# Patient Record
Sex: MALE | Race: WHITE | NOT HISPANIC OR LATINO | Employment: FULL TIME | URBAN - METROPOLITAN AREA
[De-identification: names, ages, dates, MRNs, and addresses within clinical notes are randomized per-mention and may not be internally consistent; named-entity substitution may affect disease eponyms.]

---

## 2019-04-02 ENCOUNTER — OFFICE VISIT (OUTPATIENT)
Dept: INTERNAL MEDICINE | Facility: CLINIC | Age: 49
End: 2019-04-02

## 2019-04-02 VITALS
HEART RATE: 70 BPM | DIASTOLIC BLOOD PRESSURE: 92 MMHG | WEIGHT: 240 LBS | HEIGHT: 71 IN | TEMPERATURE: 98.2 F | SYSTOLIC BLOOD PRESSURE: 162 MMHG | RESPIRATION RATE: 18 BRPM | OXYGEN SATURATION: 99 % | BODY MASS INDEX: 33.6 KG/M2

## 2019-04-02 DIAGNOSIS — R20.2 NUMBNESS AND TINGLING: ICD-10-CM

## 2019-04-02 DIAGNOSIS — I10 ESSENTIAL HYPERTENSION: Primary | ICD-10-CM

## 2019-04-02 DIAGNOSIS — R20.0 NUMBNESS AND TINGLING: ICD-10-CM

## 2019-04-02 DIAGNOSIS — R35.0 URINARY FREQUENCY: ICD-10-CM

## 2019-04-02 LAB
ANION GAP SERPL CALCULATED.3IONS-SCNC: 8 MMOL/L (ref 3–11)
BASOPHILS # BLD AUTO: 0.03 10*3/MM3 (ref 0–0.2)
BASOPHILS NFR BLD AUTO: 0.3 % (ref 0–1)
BUN BLD-MCNC: 11 MG/DL (ref 9–23)
BUN/CREAT SERPL: 8.6 (ref 7–25)
CALCIUM SPEC-SCNC: 9.7 MG/DL (ref 8.7–10.4)
CHLORIDE SERPL-SCNC: 104 MMOL/L (ref 99–109)
CO2 SERPL-SCNC: 23 MMOL/L (ref 20–31)
CREAT BLD-MCNC: 1.28 MG/DL (ref 0.6–1.3)
DEPRECATED RDW RBC AUTO: 46.4 FL (ref 37–54)
EOSINOPHIL # BLD AUTO: 0.24 10*3/MM3 (ref 0–0.3)
EOSINOPHIL NFR BLD AUTO: 2.6 % (ref 0–3)
ERYTHROCYTE [DISTWIDTH] IN BLOOD BY AUTOMATED COUNT: 13.4 % (ref 11.3–14.5)
GFR SERPL CREATININE-BSD FRML MDRD: 60 ML/MIN/1.73
GLUCOSE BLD-MCNC: 113 MG/DL (ref 70–100)
HBA1C MFR BLD: 6.1 % (ref 4.8–5.6)
HCT VFR BLD AUTO: 51.8 % (ref 38.9–50.9)
HGB BLD-MCNC: 17.4 G/DL (ref 13.1–17.5)
IMM GRANULOCYTES # BLD AUTO: 0.02 10*3/MM3 (ref 0–0.05)
IMM GRANULOCYTES NFR BLD AUTO: 0.2 % (ref 0–0.6)
LYMPHOCYTES # BLD AUTO: 2.28 10*3/MM3 (ref 0.6–4.8)
LYMPHOCYTES NFR BLD AUTO: 25.1 % (ref 24–44)
MCH RBC QN AUTO: 32 PG (ref 27–31)
MCHC RBC AUTO-ENTMCNC: 33.6 G/DL (ref 32–36)
MCV RBC AUTO: 95.2 FL (ref 80–99)
MONOCYTES # BLD AUTO: 0.68 10*3/MM3 (ref 0–1)
MONOCYTES NFR BLD AUTO: 7.5 % (ref 0–12)
NEUTROPHILS # BLD AUTO: 5.87 10*3/MM3 (ref 1.5–8.3)
NEUTROPHILS NFR BLD AUTO: 64.5 % (ref 41–71)
PLATELET # BLD AUTO: 242 10*3/MM3 (ref 150–450)
PMV BLD AUTO: 11.3 FL (ref 6–12)
POTASSIUM BLD-SCNC: 4.5 MMOL/L (ref 3.5–5.5)
RBC # BLD AUTO: 5.44 10*6/MM3 (ref 4.2–5.76)
SODIUM BLD-SCNC: 135 MMOL/L (ref 132–146)
TSH SERPL DL<=0.05 MIU/L-ACNC: 1.25 MIU/ML (ref 0.35–5.35)
VIT B12 BLD-MCNC: 543 PG/ML (ref 211–911)
WBC NRBC COR # BLD: 9.1 10*3/MM3 (ref 3.5–10.8)

## 2019-04-02 PROCEDURE — 83036 HEMOGLOBIN GLYCOSYLATED A1C: CPT | Performed by: NURSE PRACTITIONER

## 2019-04-02 PROCEDURE — 82607 VITAMIN B-12: CPT | Performed by: NURSE PRACTITIONER

## 2019-04-02 PROCEDURE — 80048 BASIC METABOLIC PNL TOTAL CA: CPT | Performed by: NURSE PRACTITIONER

## 2019-04-02 PROCEDURE — 99204 OFFICE O/P NEW MOD 45 MIN: CPT | Performed by: NURSE PRACTITIONER

## 2019-04-02 PROCEDURE — 85025 COMPLETE CBC W/AUTO DIFF WBC: CPT | Performed by: NURSE PRACTITIONER

## 2019-04-02 PROCEDURE — 84443 ASSAY THYROID STIM HORMONE: CPT | Performed by: NURSE PRACTITIONER

## 2019-04-02 RX ORDER — LISINOPRIL 20 MG/1
TABLET ORAL
Qty: 30 TABLET | Refills: 2 | Status: SHIPPED | OUTPATIENT
Start: 2019-04-02 | End: 2019-04-16 | Stop reason: SDUPTHER

## 2019-04-02 RX ORDER — ASPIRIN 81 MG/1
81 TABLET ORAL DAILY
Qty: 30 TABLET | Refills: 11 | Status: SHIPPED | OUTPATIENT
Start: 2019-04-02 | End: 2019-12-23 | Stop reason: SDUPTHER

## 2019-04-02 NOTE — PROGRESS NOTES
"Subjective   Juan C Castro is a 49 y.o. male here today for HTN.    Chief Complaint   Patient presents with   • Hypertension     migraine   • Urinary Frequency     numbness in feet.     Juan C reports that about a month ago he started having headaches- relieved by advil- pain in base of skull- checked his BP and it was 190/110- he began taking his mother's lisinopril and has seen improvement of the headaches.  He does have a significant cardiac family history. Did smoke for 19 years and now vapes (approx equivalent to 0.5 ppd).  Sometimes has shoulder pain with anxiety- \"tightness\".   He has had a stress test in 2009 and echo which were normal.  Denies SOA, LE edema.  He did recently get a new RX for glasses which has helped.  He does get 9-12,000 steps a day.  He reports his diet is getting better.      He reports urinary frequency but does drink a lot of water- has been having this for a long time.  He denies nighttime wakening.  He also reports numbness of his left hand- happens when he lays on his left shoulder- positional and resolves by changing positions.  Does have a family history of diabetes.  Review of Systems   Constitutional: Negative for activity change, appetite change, chills, fever, unexpected weight gain and unexpected weight loss.   HENT: Negative for congestion, ear pain, nosebleeds, postnasal drip, sore throat, trouble swallowing and voice change.    Eyes: Negative for blurred vision, pain, itching and visual disturbance.   Respiratory: Positive for chest tightness. Negative for cough and shortness of breath.    Cardiovascular: Negative for chest pain and palpitations.   Gastrointestinal: Negative for blood in stool, diarrhea, nausea and vomiting.   Endocrine: Negative for polydipsia, polyphagia and polyuria.   Genitourinary: Positive for frequency. Negative for urinary incontinence, decreased urine volume, difficulty urinating, dysuria, flank pain, genital sores, hematuria, " "nocturia and urgency.   Musculoskeletal: Positive for arthralgias. Negative for myalgias.   Skin: Negative for rash and skin lesions.   Allergic/Immunologic: Negative for environmental allergies, food allergies and immunocompromised state.   Neurological: Positive for headache. Negative for dizziness, seizures, weakness, memory problem and confusion.   Psychiatric/Behavioral: Negative for self-injury, sleep disturbance, suicidal ideas and depressed mood. The patient is nervous/anxious.        History reviewed. No pertinent past medical history.  Family History   Problem Relation Age of Onset   • Diabetes Mother    • Hypertension Mother    • Heart attack Father    • Heart attack Paternal Grandfather      Past Surgical History:   Procedure Laterality Date   • KNEE ACL RECONSTRUCTION       Social History     Socioeconomic History   • Marital status: Single     Spouse name: Not on file   • Number of children: Not on file   • Years of education: Not on file   • Highest education level: Not on file   Tobacco Use   • Smoking status: Current Some Day Smoker     Packs/day: 0.50     Types: Cigarettes, Electronic Cigarette   Substance and Sexual Activity   • Alcohol use: Yes         Current Outpatient Medications:   •  aspirin 81 MG EC tablet, Take 1 tablet by mouth Daily., Disp: 30 tablet, Rfl: 11  •  lisinopril (PRINIVIL,ZESTRIL) 20 MG tablet, Take 0.5 tabs for 7 days, then increase to 1 tab daily, Disp: 30 tablet, Rfl: 2    Objective   Vitals:    04/02/19 1039   BP: 162/92   Pulse: 70   Resp: 18   Temp: 98.2 °F (36.8 °C)   TempSrc: Temporal   SpO2: 99%   Weight: 109 kg (240 lb)   Height: 180.3 cm (71\")     Body mass index is 33.47 kg/m².    Physical Exam   Constitutional: He is oriented to person, place, and time. He appears well-developed and well-nourished. No distress.   HENT:   Head: Normocephalic and atraumatic.   Eyes: Pupils are equal, round, and reactive to light.   Neck: Neck supple. No thyromegaly present. "   Cardiovascular: Normal rate, regular rhythm, normal heart sounds and intact distal pulses.   No murmur heard.  Pulmonary/Chest: Effort normal and breath sounds normal. No respiratory distress. He has no wheezes.   Abdominal: Soft. He exhibits no distension.   Musculoskeletal: He exhibits no edema or deformity.   Neurological: He is alert and oriented to person, place, and time.   Skin: Skin is warm and dry. Capillary refill takes 2 to 3 seconds. He is not diaphoretic.   Psychiatric: He has a normal mood and affect. His behavior is normal. Judgment and thought content normal.   Nursing note and vitals reviewed.      Assessment/Plan   Problem List Items Addressed This Visit        Cardiovascular and Mediastinum    Essential hypertension - Primary    Relevant Medications    lisinopril (PRINIVIL,ZESTRIL) 20 MG tablet    aspirin 81 MG EC tablet    Other Relevant Orders    Basic Metabolic Panel    CBC & Differential    CBC Auto Differential      Other Visit Diagnoses     Numbness and tingling        Relevant Orders    Basic Metabolic Panel    TSH    Vitamin B12    Hemoglobin A1c    Urinary frequency        Relevant Orders    Hemoglobin A1c          Juan C was seen today for hypertension and urinary frequency.    Diagnoses and all orders for this visit:    Essential hypertension  -     Basic Metabolic Panel  -     CBC & Differential  -     lisinopril (PRINIVIL,ZESTRIL) 20 MG tablet; Take 0.5 tabs for 7 days, then increase to 1 tab daily  -     aspirin 81 MG EC tablet; Take 1 tablet by mouth Daily.  -     CBC Auto Differential  - discussed reducing risk of heart disease but controlling BP, adding ASA, will check lipids at a physical while fasting, improving diet and smoking cessation. FU in 2 weeks  Numbness and tingling  -     Basic Metabolic Panel  -     TSH  -     Vitamin B12  -     Hemoglobin A1c    Urinary frequency  -     Hemoglobin A1c             Plan of care reviewed with the patient at the conclusion of  today's visit.  Education was provided regarding diagnosis, management, and any prescribed or recommended OTC medications.  Patient verbalized understanding of and agreement with management plan.     Return in about 2 weeks (around 4/16/2019).      Elin Tolentino APRN

## 2019-04-03 PROBLEM — R73.03 PREDIABETES: Status: ACTIVE | Noted: 2019-04-03

## 2019-04-03 PROBLEM — N18.2 CKD (CHRONIC KIDNEY DISEASE) STAGE 2, GFR 60-89 ML/MIN: Status: ACTIVE | Noted: 2019-04-03

## 2019-04-16 ENCOUNTER — OFFICE VISIT (OUTPATIENT)
Dept: INTERNAL MEDICINE | Facility: CLINIC | Age: 49
End: 2019-04-16

## 2019-04-16 ENCOUNTER — TELEPHONE (OUTPATIENT)
Dept: INTERNAL MEDICINE | Facility: CLINIC | Age: 49
End: 2019-04-16

## 2019-04-16 VITALS
OXYGEN SATURATION: 99 % | HEART RATE: 79 BPM | DIASTOLIC BLOOD PRESSURE: 88 MMHG | WEIGHT: 239 LBS | TEMPERATURE: 97.6 F | RESPIRATION RATE: 16 BRPM | HEIGHT: 71 IN | BODY MASS INDEX: 33.46 KG/M2 | SYSTOLIC BLOOD PRESSURE: 138 MMHG

## 2019-04-16 DIAGNOSIS — I10 ESSENTIAL HYPERTENSION: Primary | ICD-10-CM

## 2019-04-16 DIAGNOSIS — N18.2 CKD (CHRONIC KIDNEY DISEASE) STAGE 2, GFR 60-89 ML/MIN: ICD-10-CM

## 2019-04-16 DIAGNOSIS — R73.03 PREDIABETES: ICD-10-CM

## 2019-04-16 PROCEDURE — 99214 OFFICE O/P EST MOD 30 MIN: CPT | Performed by: NURSE PRACTITIONER

## 2019-04-16 RX ORDER — LISINOPRIL 30 MG/1
TABLET ORAL
Qty: 90 TABLET | Refills: 1 | Status: SHIPPED | OUTPATIENT
Start: 2019-04-16 | End: 2019-04-16 | Stop reason: SDUPTHER

## 2019-04-16 RX ORDER — LISINOPRIL 30 MG/1
30 TABLET ORAL DAILY
Qty: 90 TABLET | Refills: 1 | Status: SHIPPED | OUTPATIENT
Start: 2019-04-16 | End: 2019-12-23

## 2019-04-16 NOTE — PROGRESS NOTES
Subjective   Juan C Castro is a 49 y.o. male here today for HTN/ CKD.    Chief Complaint   Patient presents with   • Hypertension     Juan C is here today for a 2-week follow-up on hypertension.  He was also found to have a decreased GFR and elevated creatinine.  He has avoided NSAIDs and staying hydrated.  He has checked his blood pressure twice since her checkup and it has been in the 140s 150s over 80s-90s.  He denies any chest pain, shortness of breath, lower extremity edema.  He was also found to be prediabetic.  He has already lost 1 pound and started to make many dietary modifications.  Review of Systems   Constitutional: Negative for diaphoresis, fatigue, unexpected weight gain and unexpected weight loss.   HENT: Negative for nosebleeds and trouble swallowing.    Eyes: Negative for blurred vision and visual disturbance.   Respiratory: Negative for chest tightness and shortness of breath.    Cardiovascular: Negative for chest pain, palpitations and leg swelling.   Gastrointestinal: Negative for blood in stool, nausea and vomiting.   Skin: Negative for rash and skin lesions.   Neurological: Negative for dizziness, syncope, facial asymmetry, light-headedness, headache, memory problem and confusion.       History reviewed. No pertinent past medical history.  Family History   Problem Relation Age of Onset   • Diabetes Mother    • Hypertension Mother    • Heart attack Father    • Heart attack Paternal Grandfather      Past Surgical History:   Procedure Laterality Date   • KNEE ACL RECONSTRUCTION       Social History     Socioeconomic History   • Marital status: Single     Spouse name: Not on file   • Number of children: Not on file   • Years of education: Not on file   • Highest education level: Not on file   Tobacco Use   • Smoking status: Current Some Day Smoker     Packs/day: 0.50     Types: Cigarettes, Electronic Cigarette   Substance and Sexual Activity   • Alcohol use: Yes         Current  "Outpatient Medications:   •  aspirin 81 MG EC tablet, Take 1 tablet by mouth Daily., Disp: 30 tablet, Rfl: 11  •  lisinopril (PRINIVIL,ZESTRIL) 30 MG tablet, Take 1 tablet by mouth Daily., Disp: 90 tablet, Rfl: 1    Objective   Vitals:    04/16/19 1110   BP: 138/88   Pulse: 79   Resp: 16   Temp: 97.6 °F (36.4 °C)   TempSrc: Temporal   SpO2: 99%   Weight: 108 kg (239 lb)   Height: 180.3 cm (71\")     Body mass index is 33.33 kg/m².  Physical Exam   Constitutional: He is oriented to person, place, and time. He appears well-developed and well-nourished. No distress.   Eyes: Pupils are equal, round, and reactive to light.   Neck: Neck supple. No thyromegaly present.   Cardiovascular: Normal rate and regular rhythm.   Pulmonary/Chest: Effort normal and breath sounds normal.   Musculoskeletal: He exhibits no edema.   Neurological: He is alert and oriented to person, place, and time.   Skin: Skin is warm and dry. Capillary refill takes 2 to 3 seconds. He is not diaphoretic.   Psychiatric: He has a normal mood and affect. His behavior is normal. Judgment and thought content normal.   Nursing note and vitals reviewed.      Assessment/Plan   Problem List Items Addressed This Visit        Cardiovascular and Mediastinum    Essential hypertension - Primary    Relevant Medications    aspirin 81 MG EC tablet    lisinopril (PRINIVIL,ZESTRIL) 30 MG tablet    Other Relevant Orders    CBC & Differential    Basic Metabolic Panel       Genitourinary    CKD (chronic kidney disease) stage 2, GFR 60-89 ml/min    Relevant Orders    CBC & Differential    Basic Metabolic Panel    Urinalysis With Microscopic - Urine, Clean Catch       Other    Prediabetes        Juan C was seen today for hypertension.    Diagnoses and all orders for this visit:    Essential hypertension  -     CBC & Differential  -     Basic Metabolic Panel  -     lisinopril (PRINIVIL,ZESTRIL) 30 MG tablet; 1 tab daily  Increase to 30 mg, follow-up in 3 months with a " physical  CKD (chronic kidney disease) stage 2, GFR 60-89 ml/min  -     CBC & Differential  -     Basic Metabolic Panel  -     Urinalysis With Microscopic - Urine, Clean Catch; Future    Prediabetes  Dietary restrictions discussed, follow-up in 3 months           The patient was counseled regarding diagnostic results, impressions, prognosis, instructions for management, risk factor reductions, education, and importance of treatment compliance.  The patient verbalized understanding of and agreement with the plan of care.    Advised patient to call with any further questions and any new or worsening symptoms.     Return in about 3 months (around 7/16/2019) for Physical w/Next Visit.      Elin Tolentino, APRN

## 2019-04-16 NOTE — TELEPHONE ENCOUNTER
PHARMACY CALLED ASKING FOR CLARIFICATION ON THE LISINOPRIL. IS IT 1?2 A TABLET FOR 7 DAYS, OR JUST 1 DAILY.

## 2019-07-17 ENCOUNTER — TELEPHONE (OUTPATIENT)
Dept: INTERNAL MEDICINE | Facility: CLINIC | Age: 49
End: 2019-07-17

## 2019-10-28 DIAGNOSIS — I10 ESSENTIAL HYPERTENSION: ICD-10-CM

## 2019-10-28 RX ORDER — LISINOPRIL 30 MG/1
TABLET ORAL
Qty: 30 TABLET | Refills: 0 | Status: SHIPPED | OUTPATIENT
Start: 2019-10-28 | End: 2019-11-26 | Stop reason: SDUPTHER

## 2019-11-26 DIAGNOSIS — I10 ESSENTIAL HYPERTENSION: ICD-10-CM

## 2019-11-26 RX ORDER — LISINOPRIL 30 MG/1
TABLET ORAL
Qty: 30 TABLET | Refills: 0 | Status: SHIPPED | OUTPATIENT
Start: 2019-11-26 | End: 2019-12-23 | Stop reason: SDUPTHER

## 2019-12-23 ENCOUNTER — OFFICE VISIT (OUTPATIENT)
Dept: INTERNAL MEDICINE | Facility: CLINIC | Age: 49
End: 2019-12-23

## 2019-12-23 VITALS
RESPIRATION RATE: 16 BRPM | HEART RATE: 63 BPM | OXYGEN SATURATION: 99 % | HEIGHT: 71 IN | SYSTOLIC BLOOD PRESSURE: 122 MMHG | TEMPERATURE: 98.4 F | BODY MASS INDEX: 32.2 KG/M2 | WEIGHT: 230 LBS | DIASTOLIC BLOOD PRESSURE: 80 MMHG

## 2019-12-23 DIAGNOSIS — Z23 NEED FOR INFLUENZA VACCINATION: ICD-10-CM

## 2019-12-23 DIAGNOSIS — I10 ESSENTIAL HYPERTENSION: ICD-10-CM

## 2019-12-23 DIAGNOSIS — Z00.00 ANNUAL PHYSICAL EXAM: Primary | ICD-10-CM

## 2019-12-23 PROBLEM — B00.9 HSV-1 (HERPES SIMPLEX VIRUS 1) INFECTION: Status: ACTIVE | Noted: 2019-12-23

## 2019-12-23 LAB
25(OH)D3 SERPL-MCNC: 22.2 NG/ML (ref 30–100)
ALBUMIN SERPL-MCNC: 4.3 G/DL (ref 3.5–5.2)
ALBUMIN/GLOB SERPL: 1.2 G/DL
ALP SERPL-CCNC: 95 U/L (ref 39–117)
ALT SERPL W P-5'-P-CCNC: 16 U/L (ref 1–41)
ANION GAP SERPL CALCULATED.3IONS-SCNC: 11.3 MMOL/L (ref 5–15)
AST SERPL-CCNC: 16 U/L (ref 1–40)
BASOPHILS # BLD AUTO: 0.05 10*3/MM3 (ref 0–0.2)
BASOPHILS NFR BLD AUTO: 0.6 % (ref 0–1.5)
BILIRUB SERPL-MCNC: 0.3 MG/DL (ref 0.2–1.2)
BUN BLD-MCNC: 10 MG/DL (ref 6–20)
BUN/CREAT SERPL: 8.2 (ref 7–25)
CALCIUM SPEC-SCNC: 9.5 MG/DL (ref 8.6–10.5)
CHLORIDE SERPL-SCNC: 102 MMOL/L (ref 98–107)
CHOLEST SERPL-MCNC: 236 MG/DL (ref 0–200)
CO2 SERPL-SCNC: 23.7 MMOL/L (ref 22–29)
CREAT BLD-MCNC: 1.22 MG/DL (ref 0.76–1.27)
DEPRECATED RDW RBC AUTO: 39.6 FL (ref 37–54)
EOSINOPHIL # BLD AUTO: 0.29 10*3/MM3 (ref 0–0.4)
EOSINOPHIL NFR BLD AUTO: 3.5 % (ref 0.3–6.2)
ERYTHROCYTE [DISTWIDTH] IN BLOOD BY AUTOMATED COUNT: 12.1 % (ref 12.3–15.4)
GFR SERPL CREATININE-BSD FRML MDRD: 63 ML/MIN/1.73
GLOBULIN UR ELPH-MCNC: 3.7 GM/DL
GLUCOSE BLD-MCNC: 105 MG/DL (ref 65–99)
HBA1C MFR BLD: 6.16 % (ref 4.8–5.6)
HCT VFR BLD AUTO: 47.8 % (ref 37.5–51)
HDLC SERPL-MCNC: 37 MG/DL (ref 40–60)
HGB BLD-MCNC: 16.5 G/DL (ref 13–17.7)
IMM GRANULOCYTES # BLD AUTO: 0.02 10*3/MM3 (ref 0–0.05)
IMM GRANULOCYTES NFR BLD AUTO: 0.2 % (ref 0–0.5)
LDLC SERPL CALC-MCNC: 172 MG/DL (ref 0–100)
LDLC/HDLC SERPL: 4.66 {RATIO}
LYMPHOCYTES # BLD AUTO: 2.16 10*3/MM3 (ref 0.7–3.1)
LYMPHOCYTES NFR BLD AUTO: 26.2 % (ref 19.6–45.3)
MCH RBC QN AUTO: 31.3 PG (ref 26.6–33)
MCHC RBC AUTO-ENTMCNC: 34.5 G/DL (ref 31.5–35.7)
MCV RBC AUTO: 90.5 FL (ref 79–97)
MONOCYTES # BLD AUTO: 0.61 10*3/MM3 (ref 0.1–0.9)
MONOCYTES NFR BLD AUTO: 7.4 % (ref 5–12)
NEUTROPHILS # BLD AUTO: 5.12 10*3/MM3 (ref 1.7–7)
NEUTROPHILS NFR BLD AUTO: 62.1 % (ref 42.7–76)
NRBC BLD AUTO-RTO: 0 /100 WBC (ref 0–0.2)
PLATELET # BLD AUTO: 254 10*3/MM3 (ref 140–450)
PMV BLD AUTO: 10.9 FL (ref 6–12)
POTASSIUM BLD-SCNC: 4.8 MMOL/L (ref 3.5–5.2)
PROT SERPL-MCNC: 8 G/DL (ref 6–8.5)
PSA SERPL-MCNC: 0.53 NG/ML (ref 0–4)
RBC # BLD AUTO: 5.28 10*6/MM3 (ref 4.14–5.8)
SODIUM BLD-SCNC: 137 MMOL/L (ref 136–145)
TRIGL SERPL-MCNC: 133 MG/DL (ref 0–150)
TSH SERPL DL<=0.05 MIU/L-ACNC: 0.7 UIU/ML (ref 0.27–4.2)
VLDLC SERPL-MCNC: 26.6 MG/DL (ref 5–40)
WBC NRBC COR # BLD: 8.25 10*3/MM3 (ref 3.4–10.8)

## 2019-12-23 PROCEDURE — 90674 CCIIV4 VAC NO PRSV 0.5 ML IM: CPT | Performed by: NURSE PRACTITIONER

## 2019-12-23 PROCEDURE — 80053 COMPREHEN METABOLIC PANEL: CPT | Performed by: NURSE PRACTITIONER

## 2019-12-23 PROCEDURE — 83036 HEMOGLOBIN GLYCOSYLATED A1C: CPT | Performed by: NURSE PRACTITIONER

## 2019-12-23 PROCEDURE — 82306 VITAMIN D 25 HYDROXY: CPT | Performed by: NURSE PRACTITIONER

## 2019-12-23 PROCEDURE — 99396 PREV VISIT EST AGE 40-64: CPT | Performed by: NURSE PRACTITIONER

## 2019-12-23 PROCEDURE — 90471 IMMUNIZATION ADMIN: CPT | Performed by: NURSE PRACTITIONER

## 2019-12-23 PROCEDURE — 80061 LIPID PANEL: CPT | Performed by: NURSE PRACTITIONER

## 2019-12-23 PROCEDURE — 85025 COMPLETE CBC W/AUTO DIFF WBC: CPT | Performed by: NURSE PRACTITIONER

## 2019-12-23 PROCEDURE — G0103 PSA SCREENING: HCPCS | Performed by: NURSE PRACTITIONER

## 2019-12-23 PROCEDURE — 84443 ASSAY THYROID STIM HORMONE: CPT | Performed by: NURSE PRACTITIONER

## 2019-12-23 RX ORDER — ASPIRIN 81 MG/1
81 TABLET ORAL DAILY
Qty: 30 TABLET | Refills: 11 | Status: SHIPPED | OUTPATIENT
Start: 2019-12-23 | End: 2020-06-23 | Stop reason: SDUPTHER

## 2019-12-23 RX ORDER — LISINOPRIL 30 MG/1
TABLET ORAL
Qty: 30 TABLET | Refills: 5 | Status: SHIPPED | OUTPATIENT
Start: 2019-12-23 | End: 2020-01-06

## 2019-12-23 NOTE — PATIENT INSTRUCTIONS
Mediterranean Diet  A Mediterranean diet refers to food and lifestyle choices that are based on the traditions of countries located on the [place]. This way of eating has been shown to help prevent certain conditions and improve outcomes for people who have chronic diseases, like kidney disease and heart disease.  What are tips for following this plan?  Lifestyle  · Cook and eat meals together with your family, when possible.  · Drink enough fluid to keep your urine clear or pale yellow.  · Be physically active every day. This includes:  ? Aerobic exercise like running or swimming.  ? Leisure activities like gardening, walking, or housework.  · Get 7-8 hours of sleep each night.  · If recommended by your health care provider, drink red wine in moderation. This means 1 glass a day for nonpregnant women and 2 glasses a day for men. A glass of wine equals 5 oz (150 mL).  Reading food labels    · Check the serving size of packaged foods. For foods such as rice and pasta, the serving size refers to the amount of cooked product, not dry.  · Check the total fat in packaged foods. Avoid foods that have saturated fat or trans fats.  · Check the ingredients list for added sugars, such as corn syrup.  Shopping  · At the grocery store, buy most of your food from the areas near the walls of the store. This includes:  ? Fresh fruits and vegetables (produce).  ? Grains, beans, nuts, and seeds. Some of these may be available in unpackaged forms or large amounts (in bulk).  ? Fresh seafood.  ? Poultry and eggs.  ? Low-fat dairy products.  · Buy whole ingredients instead of prepackaged foods.  · Buy fresh fruits and vegetables in-season from local farmers markets.  · Buy frozen fruits and vegetables in resealable bags.  · If you do not have access to quality fresh seafood, buy precooked frozen shrimp or canned fish, such as tuna, salmon, or sardines.  · Buy small amounts of raw or cooked vegetables, salads, or olives from  the deli or salad bar at your store.  · Stock your pantry so you always have certain foods on hand, such as olive oil, canned tuna, canned tomatoes, rice, pasta, and beans.  Cooking  · Cook foods with extra-virgin olive oil instead of using butter or other vegetable oils.  · Have meat as a side dish, and have vegetables or grains as your main dish. This means having meat in small portions or adding small amounts of meat to foods like pasta or stew.  · Use beans or vegetables instead of meat in common dishes like chili or lasagna.  · [place] with different cooking methods. Try roasting or broiling vegetables instead of steaming or sautéeing them.  · Add frozen vegetables to soups, stews, pasta, or rice.  · Add nuts or seeds for added healthy fat at each meal. You can add these to yogurt, salads, or vegetable dishes.  · Marinate fish or vegetables using olive oil, lemon juice, garlic, and fresh herbs.  Meal planning    · Plan to eat 1 vegetarian meal one day each week. Try to work up to 2 vegetarian meals, if possible.  · Eat seafood 2 or more times a week.  · Have healthy snacks readily available, such as:  ? Vegetable sticks with hummus.  ? Greek yogurt.  ? Fruit and nut trail mix.  · Eat balanced meals throughout the week. This includes:  ? Fruit: 2-3 servings a day  ? Vegetables: 4-5 servings a day  ? Low-fat dairy: 2 servings a day  ? Fish, poultry, or lean meat: 1 serving a day  ? Beans and legumes: 2 or more servings a week  ? Nuts and seeds: 1-2 servings a day  ? Whole grains: 6-8 servings a day  ? Extra-virgin olive oil: 3-4 servings a day  · Limit red meat and sweets to only a few servings a month  What are my food choices?  · Mediterranean diet  ? Recommended  ? Grains: Whole-grain pasta. Brown rice. Bulgar wheat. Polenta. Couscous. Whole-wheat bread. Oatmeal. Quinoa.  ? Vegetables: Artichokes. Beets. Broccoli. Cabbage. Carrots. Eggplant. Green beans. Chard. Kale. Spinach. Onions. Leeks. Peas. Squash.  Tomatoes. Peppers. Radishes.  ? Fruits: Apples. Apricots. Avocado. Berries. Bananas. Cherries. Dates. Figs. Grapes. [place]. Melon. Oranges. Peaches. Plums. Pomegranate.  ? Meats and other protein foods: Beans. Almonds. Sunflower seeds. Pine nuts. Peanuts. Cod. [place]. Scallops. Shrimp. Tuna. Tilapia. Clams. Oysters. Eggs.  ? Dairy: Low-fat milk. Cheese. Greek yogurt.  ? Beverages: Water. Red wine. Herbal tea.  ? Fats and oils: Extra virgin olive oil. Avocado oil. Grape seed oil.  ? Sweets and desserts: Greek yogurt with honey. Baked apples. Poached pears. Trail mix.  ? Seasoning and other foods: Basil. Cilantro. Coriander. Cumin. Mint. Parsley. Ru. Rosemary. Tarragon. Garlic. Oregano. Thyme. Pepper. Balsalmic vinegar. Tahini. Hummus. Tomato sauce. Olives. Mushrooms.  ? Limit these  ? Grains: Prepackaged pasta or rice dishes. Prepackaged cereal with added sugar.  ? Vegetables: Deep fried potatoes (french fries).  ? Fruits: Fruit canned in syrup.  ? Meats and other protein foods: Beef. Pork. Lamb. Poultry with skin. Hot dogs. Stone.  ? Dairy: Ice cream. Sour cream. Whole milk.  ? Beverages: Juice. Sugar-sweetened soft drinks. Beer. Liquor and spirits.  ? Fats and oils: Butter. Canola oil. Vegetable oil. Beef fat (tallow). Lard.  ? Sweets and desserts: Cookies. Cakes. Pies. Candy.  ? Seasoning and other foods: Mayonnaise. Premade sauces and marinades.  ? The items listed may not be a complete list. Talk with your dietitian about what dietary choices are right for you.  Summary  · The Mediterranean diet includes both food and lifestyle choices.  · Eat a variety of fresh fruits and vegetables, beans, nuts, seeds, and whole grains.  · Limit the amount of red meat and sweets that you eat.  · Talk with your health care provider about whether it is safe for you to drink red wine in moderation. This means 1 glass a day for nonpregnant women and 2 glasses a day for men. A glass of wine equals 5 oz (150 mL).  This information  is not intended to replace advice given to you by your health care provider. Make sure you discuss any questions you have with your health care provider.  Document Released: 08/10/2017 Document Revised: 09/12/2017 Document Reviewed: 08/10/2017  [email or web address] Patient Education © 2019 [employer]

## 2019-12-23 NOTE — PROGRESS NOTES
Subjective   Juan C Castro is a 49 y.o. male here today for annual    Chief Complaint   Patient presents with   • Annual Exam     needs flu shot   His overall health is: good  He exercises by playing soccer- has been losing weight and being more active  Immunizations are need flu and tdap- will wait until after keshawn to get Tdap  PSA  He does see his dentist   His diet is limiting calories to under 2000 most days. Trying to limit out to eat once a week  He describes his alcohol intake as about 2 beers a night  He does use tobacco < 1ppd  His cardiovascular risk is: elevated- fam history, tobacco use, HTN  He is sexually active.   He does have ED on occasion   He does wear a seatbelt regularly.  He does wear sunscreen regularly when outdoors.      Review of Systems   Constitutional: Negative for activity change, appetite change, chills, fever, unexpected weight gain and unexpected weight loss.   HENT: Negative for congestion, ear pain, nosebleeds, postnasal drip, sore throat, trouble swallowing and voice change.    Eyes: Negative for blurred vision, pain, itching and visual disturbance.   Respiratory: Negative for cough and shortness of breath.    Cardiovascular: Negative for chest pain and palpitations.   Gastrointestinal: Negative for blood in stool, diarrhea, nausea and vomiting.   Endocrine: Negative for polydipsia, polyphagia and polyuria.   Genitourinary: Positive for erectile dysfunction. Negative for dysuria, flank pain, frequency, genital sores, hematuria and urgency.   Musculoskeletal: Negative for arthralgias and myalgias.   Skin: Negative for rash and skin lesions.   Allergic/Immunologic: Negative for environmental allergies, food allergies and immunocompromised state.   Neurological: Negative for dizziness, seizures, weakness, headache, memory problem and confusion.   Psychiatric/Behavioral: Negative for self-injury, sleep disturbance, suicidal ideas and depressed mood. The patient is not  "nervous/anxious.        History reviewed. No pertinent past medical history.  Family History   Problem Relation Age of Onset   • Diabetes Mother    • Hypertension Mother    • Heart attack Father    • Heart attack Paternal Grandfather      Past Surgical History:   Procedure Laterality Date   • KNEE ACL RECONSTRUCTION       Social History     Socioeconomic History   • Marital status: Single     Spouse name: Not on file   • Number of children: Not on file   • Years of education: Not on file   • Highest education level: Not on file   Tobacco Use   • Smoking status: Current Some Day Smoker     Packs/day: 0.50     Types: Cigarettes, Electronic Cigarette   Substance and Sexual Activity   • Alcohol use: Yes         Current Outpatient Medications:   •  aspirin 81 MG EC tablet, Take 1 tablet by mouth Daily., Disp: 30 tablet, Rfl: 11  •  lisinopril (PRINIVIL,ZESTRIL) 30 MG tablet, TAKE 1 TABLET BY MOUTH DAILY., Disp: 30 tablet, Rfl: 5    Objective   Vitals:    12/23/19 1357   BP: 122/80   Pulse: 63   Resp: 16   Temp: 98.4 °F (36.9 °C)   TempSrc: Temporal   SpO2: 99%   Weight: 104 kg (230 lb)   Height: 180.3 cm (71\")   PainSc: 0-No pain     Body mass index is 32.08 kg/m².  Physical Exam   Constitutional: He is oriented to person, place, and time. He appears well-developed and well-nourished. He is cooperative. No distress.   HENT:   Head: Normocephalic.   Right Ear: Tympanic membrane and external ear normal.   Left Ear: Tympanic membrane and external ear normal.   Nose: Nose normal. Right sinus exhibits no maxillary sinus tenderness and no frontal sinus tenderness. Left sinus exhibits no maxillary sinus tenderness and no frontal sinus tenderness.   Mouth/Throat: Oropharynx is clear and moist and mucous membranes are normal. No oropharyngeal exudate.   Eyes: Pupils are equal, round, and reactive to light. Conjunctivae and EOM are normal. No scleral icterus.   Neck: Normal range of motion. Neck supple. Carotid bruit is not " present. No neck rigidity. No tracheal deviation present. No thyroid mass and no thyromegaly present.   Cardiovascular: Normal rate, regular rhythm, normal heart sounds and intact distal pulses. Exam reveals no gallop and no friction rub.   No murmur heard.  Pulmonary/Chest: Effort normal and breath sounds normal. No respiratory distress. He has no wheezes. He has no rales.   Abdominal: Soft. Normal appearance and bowel sounds are normal. He exhibits no distension and no mass. There is no hepatosplenomegaly. There is no tenderness. There is no rebound and no guarding. No hernia.   Musculoskeletal: Normal range of motion. He exhibits no edema, tenderness or deformity.   ROM normal with all major joints   Lymphadenopathy:        Head (right side): No submental, no submandibular, no tonsillar, no preauricular, no posterior auricular and no occipital adenopathy present.        Head (left side): No submental, no submandibular, no tonsillar, no preauricular, no posterior auricular and no occipital adenopathy present.     He has no cervical adenopathy.        Right cervical: No superficial cervical, no deep cervical and no posterior cervical adenopathy present.       Left cervical: No superficial cervical, no deep cervical and no posterior cervical adenopathy present.   Neurological: He is alert and oriented to person, place, and time. He displays no atrophy and no tremor. No cranial nerve deficit or sensory deficit. He exhibits normal muscle tone. Coordination and gait normal. GCS eye subscore is 4. GCS verbal subscore is 5. GCS motor subscore is 6.   Skin: Skin is warm and dry. Capillary refill takes 2 to 3 seconds. No rash noted. He is not diaphoretic. No cyanosis. Nails show no clubbing.   Psychiatric: He has a normal mood and affect. His speech is normal and behavior is normal. Judgment and thought content normal. Cognition and memory are normal.   Nursing note and vitals reviewed.      Assessment/Plan   Problem List  Items Addressed This Visit        Cardiovascular and Mediastinum    Essential hypertension    Relevant Medications    lisinopril (PRINIVIL,ZESTRIL) 30 MG tablet    aspirin 81 MG EC tablet      Other Visit Diagnoses     Annual physical exam    -  Primary    Relevant Orders    CBC & Differential    Comprehensive Metabolic Panel    Hemoglobin A1c    Lipid Panel    TSH    Vitamin D 25 Hydroxy    PSA Screen    CBC Auto Differential    Need for influenza vaccination        Relevant Orders    Flucelvax Quad=>4Years ([id number]) (Completed)        Juan C was seen today for annual exam.    Diagnoses and all orders for this visit:    Annual physical exam  -     CBC & Differential  -     Comprehensive Metabolic Panel  -     Hemoglobin A1c  -     Lipid Panel  -     TSH  -     Vitamin D 25 Hydroxy  -     PSA Screen  -     CBC Auto Differential  Counseled regarding: age-appropriate screening labs and tests, wearing seatbelt and sunscreen regularly, tobacco cessation  Discussed: regular exercise and diet changes to promote healthy weight, checking skin regularly for abnormal moles and lesions, mediterranean diet    Need for influenza vaccination  -     Flucelvax Quad=>4Years ([id number])             The patient was counseled regarding diagnostic results, impressions, prognosis, instructions for management, risk factor reductions, education, and importance of treatment compliance.  The patient verbalized understanding of and agreement with the plan of care.    Advised patient to call with any further questions and any new or worsening symptoms.     Return in about 6 months (around 6/23/2020) for Recheck.      REINIER Ramirez    Please note that portions of this note were completed with a voice recognition program. Efforts were made to edit the dictations, but occasionally words are mistranscribed.

## 2019-12-27 DIAGNOSIS — E78.2 MIXED HYPERLIPIDEMIA: Primary | ICD-10-CM

## 2019-12-27 RX ORDER — ATORVASTATIN CALCIUM 10 MG/1
10 TABLET, FILM COATED ORAL DAILY
Qty: 30 TABLET | Refills: 5 | Status: SHIPPED | OUTPATIENT
Start: 2019-12-27 | End: 2020-06-23 | Stop reason: SDUPTHER

## 2020-01-03 DIAGNOSIS — I10 ESSENTIAL HYPERTENSION: ICD-10-CM

## 2020-01-06 RX ORDER — LISINOPRIL 30 MG/1
TABLET ORAL
Qty: 30 TABLET | Refills: 5 | Status: SHIPPED | OUTPATIENT
Start: 2020-01-06 | End: 2020-06-23 | Stop reason: SDUPTHER

## 2020-06-23 ENCOUNTER — OFFICE VISIT (OUTPATIENT)
Dept: INTERNAL MEDICINE | Facility: CLINIC | Age: 50
End: 2020-06-23

## 2020-06-23 ENCOUNTER — LAB (OUTPATIENT)
Dept: LAB | Facility: HOSPITAL | Age: 50
End: 2020-06-23

## 2020-06-23 VITALS
BODY MASS INDEX: 31.78 KG/M2 | WEIGHT: 227 LBS | HEIGHT: 71 IN | SYSTOLIC BLOOD PRESSURE: 118 MMHG | TEMPERATURE: 97.3 F | HEART RATE: 66 BPM | OXYGEN SATURATION: 97 % | DIASTOLIC BLOOD PRESSURE: 84 MMHG | RESPIRATION RATE: 16 BRPM

## 2020-06-23 DIAGNOSIS — I10 ESSENTIAL HYPERTENSION: Primary | ICD-10-CM

## 2020-06-23 DIAGNOSIS — R73.03 PREDIABETES: ICD-10-CM

## 2020-06-23 DIAGNOSIS — F17.200 NICOTINE DEPENDENCE WITH CURRENT USE: ICD-10-CM

## 2020-06-23 DIAGNOSIS — E78.2 MIXED HYPERLIPIDEMIA: ICD-10-CM

## 2020-06-23 DIAGNOSIS — N18.2 CKD (CHRONIC KIDNEY DISEASE) STAGE 2, GFR 60-89 ML/MIN: ICD-10-CM

## 2020-06-23 DIAGNOSIS — Z23 NEED FOR TDAP VACCINATION: ICD-10-CM

## 2020-06-23 DIAGNOSIS — Z23 NEED FOR PNEUMOCOCCAL VACCINATION: ICD-10-CM

## 2020-06-23 DIAGNOSIS — Z11.59 NEED FOR HEPATITIS C SCREENING TEST: ICD-10-CM

## 2020-06-23 DIAGNOSIS — E55.9 VITAMIN D DEFICIENCY: ICD-10-CM

## 2020-06-23 LAB
25(OH)D3 SERPL-MCNC: 24.2 NG/ML (ref 30–100)
ALBUMIN SERPL-MCNC: 4.5 G/DL (ref 3.5–5.2)
ALBUMIN/GLOB SERPL: 1.6 G/DL
ALP SERPL-CCNC: 97 U/L (ref 39–117)
ALT SERPL W P-5'-P-CCNC: 19 U/L (ref 1–41)
ANION GAP SERPL CALCULATED.3IONS-SCNC: 11.8 MMOL/L (ref 5–15)
AST SERPL-CCNC: 16 U/L (ref 1–40)
BILIRUB SERPL-MCNC: 0.4 MG/DL (ref 0.2–1.2)
BUN BLD-MCNC: 8 MG/DL (ref 6–20)
BUN/CREAT SERPL: 6.6 (ref 7–25)
CALCIUM SPEC-SCNC: 9.8 MG/DL (ref 8.6–10.5)
CHLORIDE SERPL-SCNC: 100 MMOL/L (ref 98–107)
CHOLEST SERPL-MCNC: 187 MG/DL (ref 0–200)
CO2 SERPL-SCNC: 21.2 MMOL/L (ref 22–29)
CREAT BLD-MCNC: 1.22 MG/DL (ref 0.76–1.27)
DEPRECATED RDW RBC AUTO: 42.5 FL (ref 37–54)
ERYTHROCYTE [DISTWIDTH] IN BLOOD BY AUTOMATED COUNT: 12.3 % (ref 12.3–15.4)
GFR SERPL CREATININE-BSD FRML MDRD: 63 ML/MIN/1.73
GLOBULIN UR ELPH-MCNC: 2.9 GM/DL
GLUCOSE BLD-MCNC: 89 MG/DL (ref 65–99)
HBA1C MFR BLD: 5.9 % (ref 4.8–5.6)
HCT VFR BLD AUTO: 48.9 % (ref 37.5–51)
HCV AB SER DONR QL: NORMAL
HDLC SERPL-MCNC: 30 MG/DL (ref 40–60)
HGB BLD-MCNC: 17.2 G/DL (ref 13–17.7)
LDLC SERPL CALC-MCNC: 108 MG/DL (ref 0–100)
LDLC/HDLC SERPL: 3.61 {RATIO}
MCH RBC QN AUTO: 32.9 PG (ref 26.6–33)
MCHC RBC AUTO-ENTMCNC: 35.2 G/DL (ref 31.5–35.7)
MCV RBC AUTO: 93.5 FL (ref 79–97)
PLATELET # BLD AUTO: 230 10*3/MM3 (ref 140–450)
PMV BLD AUTO: 11.2 FL (ref 6–12)
POTASSIUM BLD-SCNC: 4.7 MMOL/L (ref 3.5–5.2)
PROT SERPL-MCNC: 7.4 G/DL (ref 6–8.5)
RBC # BLD AUTO: 5.23 10*6/MM3 (ref 4.14–5.8)
SODIUM BLD-SCNC: 133 MMOL/L (ref 136–145)
TRIGL SERPL-MCNC: 243 MG/DL (ref 0–150)
VLDLC SERPL-MCNC: 48.6 MG/DL (ref 5–40)
WBC NRBC COR # BLD: 10.25 10*3/MM3 (ref 3.4–10.8)

## 2020-06-23 PROCEDURE — 83036 HEMOGLOBIN GLYCOSYLATED A1C: CPT | Performed by: NURSE PRACTITIONER

## 2020-06-23 PROCEDURE — 90732 PPSV23 VACC 2 YRS+ SUBQ/IM: CPT | Performed by: NURSE PRACTITIONER

## 2020-06-23 PROCEDURE — 80061 LIPID PANEL: CPT | Performed by: NURSE PRACTITIONER

## 2020-06-23 PROCEDURE — 90471 IMMUNIZATION ADMIN: CPT | Performed by: NURSE PRACTITIONER

## 2020-06-23 PROCEDURE — 90715 TDAP VACCINE 7 YRS/> IM: CPT | Performed by: NURSE PRACTITIONER

## 2020-06-23 PROCEDURE — 90472 IMMUNIZATION ADMIN EACH ADD: CPT | Performed by: NURSE PRACTITIONER

## 2020-06-23 PROCEDURE — 85027 COMPLETE CBC AUTOMATED: CPT | Performed by: NURSE PRACTITIONER

## 2020-06-23 PROCEDURE — 82306 VITAMIN D 25 HYDROXY: CPT | Performed by: NURSE PRACTITIONER

## 2020-06-23 PROCEDURE — 86803 HEPATITIS C AB TEST: CPT | Performed by: NURSE PRACTITIONER

## 2020-06-23 PROCEDURE — 99406 BEHAV CHNG SMOKING 3-10 MIN: CPT | Performed by: NURSE PRACTITIONER

## 2020-06-23 PROCEDURE — 80053 COMPREHEN METABOLIC PANEL: CPT | Performed by: NURSE PRACTITIONER

## 2020-06-23 PROCEDURE — 99214 OFFICE O/P EST MOD 30 MIN: CPT | Performed by: NURSE PRACTITIONER

## 2020-06-23 RX ORDER — ASPIRIN 81 MG/1
81 TABLET ORAL DAILY
Qty: 90 TABLET | Refills: 5 | Status: SHIPPED | OUTPATIENT
Start: 2020-06-23

## 2020-06-23 RX ORDER — ATORVASTATIN CALCIUM 10 MG/1
10 TABLET, FILM COATED ORAL DAILY
Qty: 90 TABLET | Refills: 1 | Status: SHIPPED | OUTPATIENT
Start: 2020-06-23 | End: 2020-06-24 | Stop reason: SDUPTHER

## 2020-06-23 RX ORDER — VARENICLINE TARTRATE 1 MG/1
1 TABLET, FILM COATED ORAL 2 TIMES DAILY
Qty: 60 TABLET | Refills: 2 | Status: SHIPPED | OUTPATIENT
Start: 2020-06-23 | End: 2021-04-19

## 2020-06-23 RX ORDER — LISINOPRIL 30 MG/1
TABLET ORAL
Qty: 90 TABLET | Refills: 1 | Status: SHIPPED | OUTPATIENT
Start: 2020-06-23 | End: 2020-12-15

## 2020-06-23 NOTE — PROGRESS NOTES
Subjective   Juan C Castro is a 50 y.o. male here today for htn, hld, vt d def, ckd, prediabetes.    Chief Complaint   Patient presents with   • Hypertension   Juan C is here today for follow-up on hypertension, CKD, prediabetes, hyperlipidemia, vitamin D deficiency.  Has been compliant with his medication, denies adverse side effects.  He denies any chest pain, shortness of breath, lower extermity edema. He is walking for exercise now.  Back to smoking more. Has never tried chantix or wellbutrin.  Has smoked for about 30 years. Has quit in the past for about a year at a time.  Smoking about 20 cigarettes a day.    Review of Systems   Constitutional: Negative for activity change, appetite change, chills, fever, unexpected weight gain and unexpected weight loss.   HENT: Negative for congestion, ear pain, nosebleeds, postnasal drip, sore throat, trouble swallowing and voice change.    Eyes: Negative for blurred vision, pain, itching and visual disturbance.   Respiratory: Negative for cough and shortness of breath.    Cardiovascular: Negative for chest pain and palpitations.   Gastrointestinal: Negative for blood in stool, diarrhea, nausea and vomiting.   Endocrine: Negative for polydipsia, polyphagia and polyuria.   Genitourinary: Negative for dysuria, flank pain, frequency, genital sores, hematuria and urgency.   Musculoskeletal: Negative for arthralgias and myalgias.   Skin: Negative for rash and skin lesions.   Allergic/Immunologic: Negative for environmental allergies, food allergies and immunocompromised state.   Neurological: Negative for dizziness, seizures, weakness, headache, memory problem and confusion.   Psychiatric/Behavioral: Negative for self-injury, sleep disturbance, suicidal ideas and depressed mood. The patient is not nervous/anxious.        History reviewed. No pertinent past medical history.  Family History   Problem Relation Age of Onset   • Diabetes Mother    • Hypertension Mother   "  • Heart attack Father    • Heart attack Paternal Grandfather      Past Surgical History:   Procedure Laterality Date   • KNEE ACL RECONSTRUCTION       Social History     Socioeconomic History   • Marital status: Single     Spouse name: Not on file   • Number of children: Not on file   • Years of education: Not on file   • Highest education level: Not on file   Tobacco Use   • Smoking status: Current Some Day Smoker     Packs/day: 0.50     Types: Cigarettes, Electronic Cigarette   Substance and Sexual Activity   • Alcohol use: Yes         Current Outpatient Medications:   •  aspirin 81 MG EC tablet, Take 1 tablet by mouth Daily., Disp: 90 tablet, Rfl: 5  •  atorvastatin (LIPITOR) 10 MG tablet, Take 1 tablet by mouth Daily., Disp: 90 tablet, Rfl: 1  •  lisinopril (PRINIVIL,ZESTRIL) 30 MG tablet, TAKE 1 TABLET BY MOUTH DAILY, Disp: 90 tablet, Rfl: 1  •  varenicline (Chantix Continuing Month [place]) 1 MG tablet, Take 1 tablet by mouth 2 (Two) Times a Day., Disp: 60 tablet, Rfl: 2  •  varenicline (Chantix Starting Month [place]) 0.5 MG X 11 & 1 MG X 42 tablet, Take 0.5 mg one daily on days 1-3 and and 0.5 mg twice daily on days 4-7.Then 1 mg twice daily for a total of 12 weeks., Disp: 53 tablet, Rfl: 0    Objective   Vitals:    06/23/20 1241   BP: 118/84   Pulse: 66   Resp: 16   Temp: 97.3 °F (36.3 °C)   TempSrc: Temporal   SpO2: 97%   Weight: 103 kg (227 lb)   Height: 180.3 cm (71\")     Body mass index is 31.66 kg/m².  Physical Exam   Constitutional: He is oriented to person, place, and time. He appears well-developed and well-nourished. No distress.   Eyes: Pupils are equal, round, and reactive to light.   Neck: Neck supple. No thyromegaly present.   Cardiovascular: Normal rate and regular rhythm.   Pulmonary/Chest: Effort normal and breath sounds normal.   Neurological: He is alert and oriented to person, place, and time.   Skin: Skin is warm and dry. Capillary refill takes 2 to 3 seconds. He is not diaphoretic. "   Psychiatric: He has a normal mood and affect. His behavior is normal. Judgment and thought content normal.   Nursing note and vitals reviewed.      Assessment/Plan   Problem List Items Addressed This Visit        Cardiovascular and Mediastinum    Essential hypertension - Primary    Relevant Medications    lisinopril (PRINIVIL,ZESTRIL) 30 MG tablet    aspirin 81 MG EC tablet    Other Relevant Orders    Comprehensive Metabolic Panel    CBC (No Diff)    Mixed hyperlipidemia    Relevant Medications    atorvastatin (LIPITOR) 10 MG tablet    Other Relevant Orders    Comprehensive Metabolic Panel    CBC (No Diff)    Lipid Panel       Digestive    Vitamin D deficiency    Relevant Orders    Vitamin D 25 Hydroxy       Endocrine    Prediabetes    Relevant Orders    Comprehensive Metabolic Panel    CBC (No Diff)    Hemoglobin A1c       Genitourinary    CKD (chronic kidney disease) stage 2, GFR 60-89 ml/min    Relevant Orders    Comprehensive Metabolic Panel    CBC (No Diff)      Other Visit Diagnoses     Need for hepatitis C screening test        Relevant Orders    Hepatitis C Antibody    Nicotine dependence with current use        Relevant Medications    varenicline (Chantix Continuing Month [place]) 1 MG tablet    varenicline (Chantix Starting Month [place]) 0.5 MG X 11 & 1 MG X 42 tablet    Need for pneumococcal vaccination        Relevant Orders    Pneumococcal Polysaccharide Vaccine 23-Valent (PPSV23) Greater Than or Equal To 3yo Subcutaneous / IM (Completed)    Need for Tdap vaccination        Relevant Orders    Tdap Vaccine Greater Than or Equal To 8yo IM (Completed)        Jua nC was seen today for hypertension.    Diagnoses and all orders for this visit:    Essential hypertension  -     Comprehensive Metabolic Panel  -     CBC (No Diff)  -     lisinopril (PRINIVIL,ZESTRIL) 30 MG tablet; TAKE 1 TABLET BY MOUTH DAILY  -     aspirin 81 MG EC tablet; Take 1 tablet by mouth Daily.  -     Stable, continue current treatment  plan    CKD (chronic kidney disease) stage 2, GFR 60-89 ml/min  -     Comprehensive Metabolic Panel  -     CBC (No Diff)    Prediabetes  -     Comprehensive Metabolic Panel  -     CBC (No Diff)  -     Hemoglobin A1c    Mixed hyperlipidemia  -     Comprehensive Metabolic Panel  -     CBC (No Diff)  -     Lipid Panel  -     atorvastatin (LIPITOR) 10 MG tablet; Take 1 tablet by mouth Daily.  New medicine from last visit, tolerating without side effect.  Vitamin D deficiency  -     Vitamin D 25 Hydroxy    Need for hepatitis C screening test  -     Hepatitis C Antibody    Nicotine dependence with current use  -     varenicline (Chantix Continuing Month [place]) 1 MG tablet; Take 1 tablet by mouth 2 (Two) Times a Day.  -     varenicline (Chantix Starting Month Pak) 0.5 MG X 11 & 1 MG X 42 tablet; Take 0.5 mg one daily on days 1-3 and and 0.5 mg twice daily on days 4-7.Then 1 mg twice daily for a total of 12 weeks.  I advised the patient of the risks in continuing to use tobacco, and I provided this patient with smoking cessation educational materials.  I also discussed how to quit smoking and the patient has expressed the willingness to quit.  Discussed side effects of chantix.  Projected quit date- before July 30.     During this visit, I spent 5 mintues counseling the patient regarding smoking cessation.         Need for pneumococcal vaccination  -     Pneumococcal Polysaccharide Vaccine 23-Valent (PPSV23) Greater Than or Equal To 3yo Subcutaneous / IM    Need for Tdap vaccination  -     Tdap Vaccine Greater Than or Equal To 6yo IM             The patient was counseled regarding diagnostic results, impressions, prognosis, instructions for management, risk factor reductions, education, and importance of treatment compliance.  The patient verbalized understanding of and agreement with the plan of care.    Advised patient to call with any further questions and any new or worsening symptoms.     Return in about 6 months (around  12/23/2020) for Physical w/Next Visit.      REINIER Ramirez    Please note that portions of this note were completed with a voice recognition program. Efforts were made to edit the dictations, but occasionally words are mistranscribed.

## 2020-06-24 DIAGNOSIS — E78.2 MIXED HYPERLIPIDEMIA: ICD-10-CM

## 2020-06-24 RX ORDER — ATORVASTATIN CALCIUM 20 MG/1
20 TABLET, FILM COATED ORAL DAILY
Qty: 90 TABLET | Refills: 1 | Status: SHIPPED | OUTPATIENT
Start: 2020-06-24 | End: 2020-12-15

## 2020-12-15 DIAGNOSIS — I10 ESSENTIAL HYPERTENSION: ICD-10-CM

## 2020-12-15 DIAGNOSIS — E78.2 MIXED HYPERLIPIDEMIA: ICD-10-CM

## 2020-12-15 RX ORDER — ATORVASTATIN CALCIUM 20 MG/1
20 TABLET, FILM COATED ORAL DAILY
Qty: 90 TABLET | Refills: 1 | Status: SHIPPED | OUTPATIENT
Start: 2020-12-15 | End: 2021-06-17

## 2020-12-15 RX ORDER — LISINOPRIL 30 MG/1
TABLET ORAL
Qty: 90 TABLET | Refills: 1 | Status: SHIPPED | OUTPATIENT
Start: 2020-12-15 | End: 2021-06-17

## 2020-12-28 ENCOUNTER — LAB (OUTPATIENT)
Dept: LAB | Facility: HOSPITAL | Age: 50
End: 2020-12-28

## 2020-12-28 ENCOUNTER — OFFICE VISIT (OUTPATIENT)
Dept: INTERNAL MEDICINE | Facility: CLINIC | Age: 50
End: 2020-12-28

## 2020-12-28 VITALS
OXYGEN SATURATION: 99 % | TEMPERATURE: 97.1 F | BODY MASS INDEX: 32.28 KG/M2 | RESPIRATION RATE: 16 BRPM | DIASTOLIC BLOOD PRESSURE: 74 MMHG | SYSTOLIC BLOOD PRESSURE: 124 MMHG | WEIGHT: 230.6 LBS | HEART RATE: 83 BPM | HEIGHT: 71 IN

## 2020-12-28 DIAGNOSIS — Z12.5 SCREENING FOR PROSTATE CANCER: ICD-10-CM

## 2020-12-28 DIAGNOSIS — F41.9 ANXIETY: ICD-10-CM

## 2020-12-28 DIAGNOSIS — Z12.11 SCREENING FOR COLON CANCER: ICD-10-CM

## 2020-12-28 DIAGNOSIS — Z00.00 ANNUAL PHYSICAL EXAM: Primary | ICD-10-CM

## 2020-12-28 LAB
25(OH)D3 SERPL-MCNC: 19 NG/ML (ref 30–100)
ALBUMIN SERPL-MCNC: 4.4 G/DL (ref 3.5–5.2)
ALBUMIN/GLOB SERPL: 1.4 G/DL
ALP SERPL-CCNC: 110 U/L (ref 39–117)
ALT SERPL W P-5'-P-CCNC: 19 U/L (ref 1–41)
ANION GAP SERPL CALCULATED.3IONS-SCNC: 8.7 MMOL/L (ref 5–15)
AST SERPL-CCNC: 19 U/L (ref 1–40)
BILIRUB SERPL-MCNC: 0.4 MG/DL (ref 0–1.2)
BUN SERPL-MCNC: 9 MG/DL (ref 6–20)
BUN/CREAT SERPL: 8 (ref 7–25)
CALCIUM SPEC-SCNC: 9.6 MG/DL (ref 8.6–10.5)
CHLORIDE SERPL-SCNC: 104 MMOL/L (ref 98–107)
CHOLEST SERPL-MCNC: 172 MG/DL (ref 0–200)
CO2 SERPL-SCNC: 24.3 MMOL/L (ref 22–29)
CREAT SERPL-MCNC: 1.12 MG/DL (ref 0.76–1.27)
DEPRECATED RDW RBC AUTO: 43.8 FL (ref 37–54)
ERYTHROCYTE [DISTWIDTH] IN BLOOD BY AUTOMATED COUNT: 12.8 % (ref 12.3–15.4)
GFR SERPL CREATININE-BSD FRML MDRD: 70 ML/MIN/1.73
GLOBULIN UR ELPH-MCNC: 3.2 GM/DL
GLUCOSE SERPL-MCNC: 118 MG/DL (ref 65–99)
HBA1C MFR BLD: 5.8 % (ref 4.8–5.6)
HCT VFR BLD AUTO: 50.8 % (ref 37.5–51)
HDLC SERPL-MCNC: 35 MG/DL (ref 40–60)
HGB BLD-MCNC: 17.6 G/DL (ref 13–17.7)
LDLC SERPL CALC-MCNC: 111 MG/DL (ref 0–100)
LDLC/HDLC SERPL: 3.09 {RATIO}
MCH RBC QN AUTO: 32.5 PG (ref 26.6–33)
MCHC RBC AUTO-ENTMCNC: 34.6 G/DL (ref 31.5–35.7)
MCV RBC AUTO: 93.9 FL (ref 79–97)
PLATELET # BLD AUTO: 227 10*3/MM3 (ref 140–450)
PMV BLD AUTO: 10.9 FL (ref 6–12)
POTASSIUM SERPL-SCNC: 4.8 MMOL/L (ref 3.5–5.2)
PROT SERPL-MCNC: 7.6 G/DL (ref 6–8.5)
RBC # BLD AUTO: 5.41 10*6/MM3 (ref 4.14–5.8)
SODIUM SERPL-SCNC: 137 MMOL/L (ref 136–145)
TRIGL SERPL-MCNC: 145 MG/DL (ref 0–150)
TSH SERPL DL<=0.05 MIU/L-ACNC: 1.01 UIU/ML (ref 0.27–4.2)
VIT B12 BLD-MCNC: 309 PG/ML (ref 211–946)
VLDLC SERPL-MCNC: 26 MG/DL (ref 5–40)
WBC # BLD AUTO: 8.35 10*3/MM3 (ref 3.4–10.8)

## 2020-12-28 PROCEDURE — 85027 COMPLETE CBC AUTOMATED: CPT | Performed by: INTERNAL MEDICINE

## 2020-12-28 PROCEDURE — 90686 IIV4 VACC NO PRSV 0.5 ML IM: CPT | Performed by: INTERNAL MEDICINE

## 2020-12-28 PROCEDURE — 84443 ASSAY THYROID STIM HORMONE: CPT | Performed by: INTERNAL MEDICINE

## 2020-12-28 PROCEDURE — 82306 VITAMIN D 25 HYDROXY: CPT | Performed by: INTERNAL MEDICINE

## 2020-12-28 PROCEDURE — 80053 COMPREHEN METABOLIC PANEL: CPT | Performed by: INTERNAL MEDICINE

## 2020-12-28 PROCEDURE — 99396 PREV VISIT EST AGE 40-64: CPT | Performed by: INTERNAL MEDICINE

## 2020-12-28 PROCEDURE — 83036 HEMOGLOBIN GLYCOSYLATED A1C: CPT | Performed by: INTERNAL MEDICINE

## 2020-12-28 PROCEDURE — 80061 LIPID PANEL: CPT | Performed by: INTERNAL MEDICINE

## 2020-12-28 PROCEDURE — 99213 OFFICE O/P EST LOW 20 MIN: CPT | Performed by: INTERNAL MEDICINE

## 2020-12-28 PROCEDURE — 82607 VITAMIN B-12: CPT | Performed by: INTERNAL MEDICINE

## 2020-12-28 PROCEDURE — 90471 IMMUNIZATION ADMIN: CPT | Performed by: INTERNAL MEDICINE

## 2020-12-28 RX ORDER — LORAZEPAM 0.5 MG/1
0.5 TABLET ORAL EVERY 8 HOURS PRN
Qty: 14 TABLET | Refills: 0 | Status: SHIPPED | OUTPATIENT
Start: 2020-12-28 | End: 2021-07-02 | Stop reason: SDUPTHER

## 2020-12-28 NOTE — PROGRESS NOTES
Office Note      Date: 2020  Patient Name: Juan C Castro  MRN: [id number]  : 1970    Chief Complaint   Patient presents with   • Annual Exam     Former [facility] patient, pt is fasting for labs; wants to discuss getting labs/genetic testing for Cancer genes       History of Present Illness: Juan C Castro is a 50 y.o. male who presents for Annual Exam (Former [facility] patient, pt is fasting for labs; wants to discuss getting labs/genetic testing for Cancer genes).     Father passed away last week and has become very anxious.  Had panic attack last week.  Previously given Ativan for anxiety.  History of PTSD.     he currently smokes tobacco.  Has Chantix at home but has not started taking it.  Subjective      Review of Systems:   Pertinent review of systems per HPI.    Review of Systems   Constitutional: Negative for activity change, appetite change, chills, diaphoresis and fatigue.   HENT: Negative for congestion, dental problem, drooling, ear discharge, ear pain, facial swelling and hearing loss.    Eyes: Negative for photophobia, pain, discharge, redness, itching and visual disturbance.   Respiratory: Negative for cough, choking, chest tightness and shortness of breath.    Cardiovascular: Negative for chest pain, palpitations and leg swelling.   Endocrine: Negative for cold intolerance, heat intolerance, polydipsia and polyuria.   Genitourinary: Negative for difficulty urinating, dysuria, flank pain, frequency and hematuria.   Musculoskeletal: Negative for arthralgias and back pain.   Skin: Negative for color change, pallor, rash and wound.   Allergic/Immunologic: Negative for environmental allergies.   Neurological: Negative for dizziness, facial asymmetry, light-headedness and headaches.   Psychiatric/Behavioral: The patient is nervous/anxious.    All other systems reviewed and are negative.    No Known Allergies    Objective     Physical Exam:  Vital Signs:   Vitals:    20 0921  "  BP: 124/74   Pulse: 83   Resp: 16   Temp: 97.1 °F (36.2 °C)   SpO2: 99%   Weight: 105 kg (230 lb 9.6 oz)   Height: 180.3 cm (71\")      Body mass index is 32.16 kg/m².    Physical Exam  Vitals signs and nursing note reviewed.   Constitutional:       General: He is not in acute distress.     Appearance: He is well-developed.   HENT:      Head: Normocephalic and atraumatic.      Right Ear: External ear normal.      Left Ear: External ear normal.   Eyes:      General: No scleral icterus.        Right eye: No discharge.         Left eye: No discharge.      Conjunctiva/sclera: Conjunctivae normal.   Cardiovascular:      Rate and Rhythm: Normal rate and regular rhythm.      Heart sounds: Normal heart sounds. No murmur. No friction rub. No gallop.    Pulmonary:      Effort: Pulmonary effort is normal. No respiratory distress.      Breath sounds: Normal breath sounds. No wheezing or rales.   Skin:     General: Skin is warm and dry.      Coloration: Skin is not pale.         Assessment / Plan      Assessment & Plan:    1. Annual physical exam  Counseled on:  Colonoscopy at 45-51 y/o  PNA vaccinations starting at age 65  shingrix at age 50  Td/Tdap q 10 years  Wear seatbelt when driving  Flu shot annually    - CBC (No Diff)  - Comprehensive Metabolic Panel  - Lipid Panel  - TSH Rfx On Abnormal To Free T4  - Vitamin B12  - Vitamin D 25 Hydroxy  - Hemoglobin A1c    2. Screening for colon cancer    - Ambulatory Referral to Gastroenterology    3. Screening for prostate cancer  Checking psa    4. Anxiety  14 tabs of Ativan given.  If starting to have daily anxiety discussed adding on a daily antidepressant.      Micaela Kumar MD  12/28/2020     Please note that portions of this note may have been completed with a voice recognition program. Efforts were made to edit the dictations, but occasionally words are mistranscribed.  "

## 2020-12-30 DIAGNOSIS — Z12.11 SCREENING FOR COLON CANCER: Primary | ICD-10-CM

## 2020-12-30 RX ORDER — SODIUM, POTASSIUM,MAG SULFATES 17.5-3.13G
1 SOLUTION, RECONSTITUTED, ORAL ORAL TAKE AS DIRECTED
Qty: 2 BOTTLE | Refills: 0 | Status: SHIPPED | OUTPATIENT
Start: 2020-12-30 | End: 2021-04-19

## 2020-12-31 RX ORDER — ERGOCALCIFEROL 1.25 MG/1
50000 CAPSULE ORAL WEEKLY
Qty: 12 CAPSULE | Refills: 0 | Status: SHIPPED | OUTPATIENT
Start: 2020-12-31 | End: 2021-03-22

## 2021-01-11 ENCOUNTER — APPOINTMENT (OUTPATIENT)
Dept: PREADMISSION TESTING | Facility: HOSPITAL | Age: 51
End: 2021-01-11

## 2021-01-11 LAB — SARS-COV-2 RNA RESP QL NAA+PROBE: NOT DETECTED

## 2021-01-11 PROCEDURE — U0004 COV-19 TEST NON-CDC HGH THRU: HCPCS

## 2021-01-11 PROCEDURE — C9803 HOPD COVID-19 SPEC COLLECT: HCPCS

## 2021-01-13 ENCOUNTER — OUTSIDE FACILITY SERVICE (OUTPATIENT)
Dept: GASTROENTEROLOGY | Facility: CLINIC | Age: 51
End: 2021-01-13

## 2021-01-13 PROCEDURE — 88305 TISSUE EXAM BY PATHOLOGIST: CPT | Performed by: INTERNAL MEDICINE

## 2021-01-13 PROCEDURE — 45385 COLONOSCOPY W/LESION REMOVAL: CPT | Performed by: INTERNAL MEDICINE

## 2021-01-14 ENCOUNTER — LAB REQUISITION (OUTPATIENT)
Dept: LAB | Facility: HOSPITAL | Age: 51
End: 2021-01-14

## 2021-01-14 DIAGNOSIS — Z83.71 FAMILY HISTORY OF COLONIC POLYPS: ICD-10-CM

## 2021-01-14 DIAGNOSIS — Z12.11 ENCOUNTER FOR SCREENING FOR MALIGNANT NEOPLASM OF COLON: ICD-10-CM

## 2021-01-15 LAB
CYTO UR: NORMAL
LAB AP CASE REPORT: NORMAL
LAB AP CLINICAL INFORMATION: NORMAL
PATH REPORT.FINAL DX SPEC: NORMAL
PATH REPORT.GROSS SPEC: NORMAL

## 2021-01-18 ENCOUNTER — TELEPHONE (OUTPATIENT)
Dept: GASTROENTEROLOGY | Facility: CLINIC | Age: 51
End: 2021-01-18

## 2021-01-18 NOTE — TELEPHONE ENCOUNTER
----- Message from Edwar Suarez MD sent at 1/15/2021  2:55 PM EST -----  Let Mr. Castro know that there were 3 adenoma-type polyps.  He will need a repeat examination in 3 years.  Thank you,  ROXANNE

## 2021-01-18 NOTE — TELEPHONE ENCOUNTER
I TRIED TO CALL MR PATEL TO GIVE BIOPSY RESULTS. NO ANSWER; LEFT VOICE MESSAGE. I WILL SEND RESULTS THROUGH PATIENT'S MY CHART AND THROUGH THE MAIL.

## 2021-03-22 RX ORDER — ERGOCALCIFEROL 1.25 MG/1
CAPSULE ORAL
Qty: 12 CAPSULE | Refills: 0 | Status: SHIPPED | OUTPATIENT
Start: 2021-03-22 | End: 2021-06-10

## 2021-04-19 ENCOUNTER — OFFICE VISIT (OUTPATIENT)
Dept: INTERNAL MEDICINE | Facility: CLINIC | Age: 51
End: 2021-04-19

## 2021-04-19 VITALS
SYSTOLIC BLOOD PRESSURE: 142 MMHG | BODY MASS INDEX: 32.34 KG/M2 | DIASTOLIC BLOOD PRESSURE: 80 MMHG | HEIGHT: 71 IN | TEMPERATURE: 97.6 F | RESPIRATION RATE: 20 BRPM | HEART RATE: 74 BPM | OXYGEN SATURATION: 98 % | WEIGHT: 231 LBS

## 2021-04-19 DIAGNOSIS — M54.42 ACUTE LEFT-SIDED LOW BACK PAIN WITH LEFT-SIDED SCIATICA: Primary | ICD-10-CM

## 2021-04-19 PROCEDURE — 99213 OFFICE O/P EST LOW 20 MIN: CPT | Performed by: INTERNAL MEDICINE

## 2021-04-19 RX ORDER — METHYLPREDNISOLONE 4 MG/1
TABLET ORAL
Qty: 21 TABLET | Refills: 0 | Status: SHIPPED | OUTPATIENT
Start: 2021-04-19 | End: 2021-04-24

## 2021-04-19 NOTE — PROGRESS NOTES
"     Office Note      Date: 2021  Patient Name: Juan C Castro  MRN: [id number]  : 1970    Chief Complaint   Patient presents with   • Back Pain       History of Present Illness: Juan C Castro is a 51 y.o. male who presents for Back Pain. Back pain x 3 weeks. Pulled it back in November. This current episode occurred when twisting doing laundry. Radiates down left leg. Sitting down improves pain. Standing worsens pain. Has tried ibuprofen, mobic and flexeril w/ some relief. No urinary issues.     Subjective      Review of Systems:   Pertinent review of systems per HPI.    Review of Systems   Constitutional: Negative for activity change, appetite change, chills, diaphoresis and fatigue.   HENT: Negative for congestion, dental problem, drooling, ear discharge, ear pain, facial swelling and hearing loss.    Eyes: Negative for photophobia, pain, discharge, redness, itching and visual disturbance.   Respiratory: Negative for cough, choking, chest tightness and shortness of breath.    Cardiovascular: Negative for chest pain, palpitations and leg swelling.   Endocrine: Negative for cold intolerance, heat intolerance, polydipsia and polyuria.   Genitourinary: Negative for difficulty urinating, dysuria, flank pain, frequency and hematuria.   Musculoskeletal: Positive for back pain. Negative for arthralgias.   Skin: Negative for color change, pallor, rash and wound.   Allergic/Immunologic: Negative for environmental allergies.   Neurological: Negative for dizziness, facial asymmetry, light-headedness and headaches.   Psychiatric/Behavioral: Negative.    All other systems reviewed and are negative.    No Known Allergies    Objective     Physical Exam:  Vital Signs:   Vitals:    21 1541   BP: 142/80   Pulse: 74   Resp: 20   Temp: 97.6 °F (36.4 °C)   TempSrc: Temporal   SpO2: 98%   Weight: 105 kg (231 lb)   Height: 180.3 cm (71\")      Body mass index is 32.22 kg/m².    Physical Exam  Vitals and " nursing note reviewed.   Constitutional:       General: He is not in acute distress.     Appearance: He is well-developed.   HENT:      Head: Normocephalic and atraumatic.      Right Ear: External ear normal.      Left Ear: External ear normal.   Eyes:      General: No scleral icterus.        Right eye: No discharge.         Left eye: No discharge.      Conjunctiva/sclera: Conjunctivae normal.   Cardiovascular:      Rate and Rhythm: Normal rate and regular rhythm.      Heart sounds: Normal heart sounds. No murmur heard.   No friction rub. No gallop.    Pulmonary:      Effort: Pulmonary effort is normal. No respiratory distress.      Breath sounds: Normal breath sounds. No wheezing or rales.   Skin:     General: Skin is warm and dry.      Coloration: Skin is not pale.         Assessment / Plan      Assessment & Plan:    1. Acute left-sided low back pain with left-sided sciatica  Rx for Medrol Dosepak.  Continue NSAIDs.  Also advised over-the-counter topical pain medications  - Ambulatory Referral to Physical Therapy      Micaela Peter MD  04/19/2021     Please note that portions of this note may have been completed with a voice recognition program. Efforts were made to edit the dictations, but occasionally words are mistranscribed.

## 2021-05-03 ENCOUNTER — TREATMENT (OUTPATIENT)
Dept: PHYSICAL THERAPY | Facility: CLINIC | Age: 51
End: 2021-05-03

## 2021-05-03 DIAGNOSIS — M54.42 CHRONIC MIDLINE LOW BACK PAIN WITH LEFT-SIDED SCIATICA: Primary | ICD-10-CM

## 2021-05-03 DIAGNOSIS — G89.29 CHRONIC MIDLINE LOW BACK PAIN WITH LEFT-SIDED SCIATICA: Primary | ICD-10-CM

## 2021-05-03 PROCEDURE — 97162 PT EVAL MOD COMPLEX 30 MIN: CPT | Performed by: PHYSICAL THERAPIST

## 2021-05-03 PROCEDURE — 97110 THERAPEUTIC EXERCISES: CPT | Performed by: PHYSICAL THERAPIST

## 2021-05-03 NOTE — PROGRESS NOTES
Physical Therapy Initial Evaluation and Plan of Care    Patient: Juan C Castro   : 1970  Diagnosis/ICD-10 Code:  Chronic midline low back pain with left-sided sciatica [M54.42, G89.29]  Referring practitioner: Micaela Peter MD  Date of Initial Visit: 5/3/2021  Today's Date: 5/3/2021  Patient seen for 1 sessions           Subjective Questionnaire: Oswestry: 26%      Subjective Evaluation    History of Present Illness  Date of onset: 3/22/2021  Mechanism of injury: Pt states he hurt his back in 2020 when attempting to lift heavy at home. Repeated pain episode noted recently in March  when he performed a twisting motion holding a laundry basket, twisted to left side. Pt states he had increased pain with standing, walking. States he has swelling in his left side, lumbar strain with some bruising blue noted.  Pt states he took steroids and had immediate relief, then used deep blue topical, heat/cold with some relief. Pt reports the initial pain lasted longer this past episode. States he had bilateral leg cramps in gastrocs, gets muscle tightness on left leg. States last night was the first night in a while that he felt better, less pain overall after taking a flexeril from his mom.     Subjective comment: Pt presents with c/o low back pain and symptoms in LLE.   Patient Occupation: Pt is working from home, desk position, able to stand/walk/sit as needed. Has SO and is moving into a new house soon. Enjoys referee soccer, has dog.  Pain  Current pain ratin  At best pain ratin  At worst pain ratin  Relieving factors: change in position, medications and rest    Social Support  Lives in: multiple-level home  Lives with: significant other    Hand dominance: right    Patient Goals  Patient goals for therapy: decreased pain, increased motion and increased strength             Objective        Special Questions  Patient is experiencing disturbed sleep.     Additional Special  Questions  Denies bowel/bladder dysfunction      Postural Observations  Seated posture: fair        Neurological Testing     Sensation     Lumbar   Left   Intact: light touch    Right   Intact: light touch    Reflexes   Left   Patellar (L4): normal (2+)    Right   Patellar (L4): normal (2+)    Active Range of Motion     Lumbar   Flexion: 50 degrees   Extension: 24 degrees   Left lateral flexion: 23 degrees with pain  Right lateral flexion: 25 degrees     Strength/Myotome Testing     Left Hip   Planes of Motion   Flexion: 4 (pain)  Abduction: 4+  Adduction: 5    Right Hip   Planes of Motion   Flexion: 5  Abduction: 4+  Adduction: 5    Left Knee   Flexion: 5  Extension: 4+    Right Knee   Flexion: 5  Extension: 5    Tests     Lumbar     Left   Negative valsalva.     Right   Negative valsalva.     Additional Tests Details  (-) SLR bilateral  Mild stretch L FABIOLA  (+) SKTC LLE  L LTR limited motion due to pain    Prone tolerated           Assessment & Plan     Assessment  Impairments: abnormal or restricted ROM, activity intolerance, impaired physical strength, lacks appropriate home exercise program and pain with function  Assessment details: Pt is a 52 yo male referred to PT for low back pain who demonstrates signs and symptoms consistent with lumbar radiculopathy into LLE. He demonstrates limited lumbar AROM, decreased LLE strength, and increased neural tension LLE. Recommend skilled PT to improve spinal stabilization, decrease LLE neural tension, centralize symptoms, and decrease overall pain.   Prognosis: good  Functional Limitations: carrying objects, lifting, walking, pulling, pushing, uncomfortable because of pain, sitting and standing  Goals  Plan Goals: Short Term Goals (4 weeks)  1. Patient is independent with HEP for flexibility and strengthening.  2.  Patient to report pain less than or equal to 2/10.  3.  Patient to report decreased pain with standing and walking for 20 min.   4. Patient subjectively  report that altered leg symptoms have decreased by 50% with frequency or intensity.    Long Term Goals (8 weeks)  1. Patient will demonstrate lumbar AROM WNL in all planes to improve ability to perform daily functional activities.  2. Patient is independent with long term HEP for improved postural awareness and stabilization.  3. Modified Oswestry score is improved by at least 10%.  4.Patient will demonstrate proper lifting mechanics, utilizing abdominal stabilization.  5. Patient is able to tolerate at least 30 min of standing or walking to improved tolerance to ADLs.      Plan  Therapy options: will be seen for skilled physical therapy services  Planned modality interventions: cryotherapy, dry needling, electrical stimulation/Russian stimulation, traction, ultrasound and thermotherapy (hydrocollator packs)  Planned therapy interventions: abdominal trunk stabilization, balance/weight-bearing training, body mechanics training, flexibility, functional ROM exercises, gait training, home exercise program, joint mobilization, manual therapy, neuromuscular re-education, postural training, soft tissue mobilization, spinal/joint mobilization, strengthening, stretching and therapeutic activities  Frequency: 1x week  Duration in visits: 8  Treatment plan discussed with: patient  Plan details: Assess compliance with initial HEP. Progress with spinal stabilization as tolerated. Manual and modalities as indicated to decrease pain.         Manual Therapy:    0     mins  93256;  Therapeutic Exercise:    14     mins  10985;     Neuromuscular Elham:    0    mins  09788;    Therapeutic Activity:     0     mins  57574;     Gait Trainin     mins  70936;     Ultrasound:     0     mins  77252;    Electrical Stimulation:    0     mins  71334 ( );  Dry Needling     0     mins self-pay    Timed Treatment:   50   mins   Total Treatment:     50   mins    PT SIGNATURE: Corinne E. Perkins, PT   DATE TREATMENT INITIATED:  5/3/2021    Initial Certification  Certification Period: 8/1/2021  I certify that the therapy services are furnished while this patient is under my care.  The services outlined above are required by this patient, and will be reviewed every 90 days.     PHYSICIAN: Micaela Peter MD      DATE:     Please sign and return via fax to [phone].. Thank you, [facility].

## 2021-05-03 NOTE — PATIENT INSTRUCTIONS
Access Code: [email or web address]: [email or web address]: 05/03/2021Prepared by: Corinne PerkinsExercises   Supine Lower Trunk Rotation - 1 x daily - 5 x weekly - 2 sets - 10 reps   Hooklying Gluteal Sets - 1 x daily - 5 x weekly - 2 sets - 10 reps   Prone Press Up on Elbows - 1 x daily - 5 x weekly - 2 sets - 10 reps

## 2021-05-13 ENCOUNTER — TREATMENT (OUTPATIENT)
Dept: PHYSICAL THERAPY | Facility: CLINIC | Age: 51
End: 2021-05-13

## 2021-05-13 DIAGNOSIS — M54.42 CHRONIC MIDLINE LOW BACK PAIN WITH LEFT-SIDED SCIATICA: Primary | ICD-10-CM

## 2021-05-13 DIAGNOSIS — G89.29 CHRONIC MIDLINE LOW BACK PAIN WITH LEFT-SIDED SCIATICA: Primary | ICD-10-CM

## 2021-05-13 PROCEDURE — 97140 MANUAL THERAPY 1/> REGIONS: CPT | Performed by: PHYSICAL THERAPIST

## 2021-05-13 PROCEDURE — 97110 THERAPEUTIC EXERCISES: CPT | Performed by: PHYSICAL THERAPIST

## 2021-05-13 PROCEDURE — 97012 MECHANICAL TRACTION THERAPY: CPT | Performed by: PHYSICAL THERAPIST

## 2021-05-13 NOTE — PROGRESS NOTES
Physical Therapy Daily Progress Note    Date: 2021    Patient: Juan C Castro  Medical Record #: [id number]  Referring Provider: Micaela Peter MD    Visit Diagnosis/ICD-10 Code: Chronic midline low back pain with left-sided sciatica [M54.42, G89.29]  Patient seen for 2 sessions    Subjective   Pt states he had a bad few days last week and this week due to pain overall, took pain pill without significant relief last night. Did report relief with distraction of LLE .    Pain Scale (0-10): 4-5/10 in low back and into left leg above knee      Objective    Relief with prone press ups and OP from PT. Relief of radicular symptoms with manual distraction of LLE.  See Exercise, Manual and Modality logs for complete treatment.    Objective     Treatment/Procedures/Modalities:   Manual Therapy: 10 Minutes  Therapeutic Exercise: 24 Minutes   Neuromuscular Re-Education: 0 Minutes   Therapeutic Activity: 0 Minutes  Gait Trainin Minutes   Moist Heat:    Ice:    E-Stim:    Ultrasound:    Ionto:   Traction:  10 minutes    Timed Code Treatment: 34 Minutes  Total Treatment Time: 44 Minutes    Assessment / Plan:   Assessment/Plan   Centralization of LLE symptoms noted with prone press ups, prone prop ups; however quickly returned with neutral position. Improved lumbar extension AROM noted post manual techniques. Relief of LLE symptoms after lumbar mechanical traction today.     Progress strengthening /stabilization /functional activity. Assess response from mechanical traction. Progress extension based movements as able. Could consider DN to lumbar paravertebrals, inf glutes as needed.       Corinne E. Perkins, PT  Physical Therapist

## 2021-05-19 ENCOUNTER — OFFICE VISIT (OUTPATIENT)
Dept: INTERNAL MEDICINE | Facility: CLINIC | Age: 51
End: 2021-05-19

## 2021-05-19 VITALS
HEART RATE: 80 BPM | BODY MASS INDEX: 31.92 KG/M2 | DIASTOLIC BLOOD PRESSURE: 100 MMHG | RESPIRATION RATE: 18 BRPM | WEIGHT: 228 LBS | SYSTOLIC BLOOD PRESSURE: 176 MMHG | TEMPERATURE: 97.1 F | HEIGHT: 71 IN | OXYGEN SATURATION: 98 %

## 2021-05-19 DIAGNOSIS — R11.0 NAUSEA: ICD-10-CM

## 2021-05-19 DIAGNOSIS — G89.29 CHRONIC MIDLINE LOW BACK PAIN WITH LEFT-SIDED SCIATICA: Primary | ICD-10-CM

## 2021-05-19 DIAGNOSIS — M54.42 CHRONIC MIDLINE LOW BACK PAIN WITH LEFT-SIDED SCIATICA: Primary | ICD-10-CM

## 2021-05-19 PROCEDURE — 99214 OFFICE O/P EST MOD 30 MIN: CPT | Performed by: INTERNAL MEDICINE

## 2021-05-19 RX ORDER — ONDANSETRON 4 MG/1
4 TABLET, FILM COATED ORAL EVERY 8 HOURS PRN
COMMUNITY

## 2021-05-19 RX ORDER — OMEPRAZOLE 40 MG/1
40 CAPSULE, DELAYED RELEASE ORAL DAILY
Qty: 30 CAPSULE | Refills: 5 | Status: SHIPPED | OUTPATIENT
Start: 2021-05-19 | End: 2021-10-26

## 2021-05-19 RX ORDER — SUCRALFATE 1 G/1
1 TABLET ORAL 4 TIMES DAILY
Qty: 60 TABLET | Refills: 0 | Status: SHIPPED | OUTPATIENT
Start: 2021-05-19 | End: 2021-06-01

## 2021-05-19 RX ORDER — CYCLOBENZAPRINE HCL 10 MG
10 TABLET ORAL 3 TIMES DAILY PRN
Qty: 30 TABLET | Refills: 0 | Status: SHIPPED | OUTPATIENT
Start: 2021-05-19 | End: 2022-02-21

## 2021-05-19 RX ORDER — METHYLPREDNISOLONE 4 MG/1
TABLET ORAL
Qty: 21 TABLET | Refills: 0 | Status: SHIPPED | OUTPATIENT
Start: 2021-05-19 | End: 2021-05-24

## 2021-05-19 NOTE — PROGRESS NOTES
Office Note      Date: 2021  Patient Name: Juan C Castro  MRN: [id number]  : 1970    Chief Complaint   Patient presents with   • Back Pain     x2 months, cant sleep x3 days       History of Present Illness: Juan C Castro is a 51 y.o. male who presents for Back Pain (x2 months, cant sleep x3 days).  Seen in clinic 1 month ago for back pain.  Given Medrol Dosepak and referral to physical therapy whom he was seen twice so far.  This morning developed acute back pain with leg shaking and nausea. Has dry heaving. Has tried zofran twice w/o improvement. Tried advil for pain, took 5 tabs yesterday, and the day prior he took 11 tabs. No hx of GERD.       Subjective      Review of Systems:   Pertinent review of systems per HPI.    Review of Systems   Constitutional: Negative for activity change, appetite change, chills, diaphoresis and fatigue.   HENT: Negative for congestion, dental problem, drooling, ear discharge, ear pain, facial swelling and hearing loss.    Eyes: Negative for photophobia, pain, discharge, redness, itching and visual disturbance.   Respiratory: Negative for cough, choking, chest tightness and shortness of breath.    Cardiovascular: Negative for chest pain, palpitations and leg swelling.   Gastrointestinal: Positive for nausea.   Endocrine: Negative for cold intolerance, heat intolerance, polydipsia and polyuria.   Genitourinary: Negative for difficulty urinating, dysuria, flank pain, frequency and hematuria.   Musculoskeletal: Positive for back pain. Negative for arthralgias.   Skin: Negative for color change, pallor, rash and wound.   Allergic/Immunologic: Negative for environmental allergies.   Neurological: Positive for tremors. Negative for dizziness, facial asymmetry, light-headedness and headaches.   Psychiatric/Behavioral: Negative.    All other systems reviewed and are negative.    No Known Allergies    Objective     Physical Exam:  Vital Signs:   Vitals:     "05/19/21 1254   BP: 176/100   Pulse: 80   Resp: 18   Temp: 97.1 °F (36.2 °C)   TempSrc: Temporal   SpO2: 98%   Weight: 103 kg (228 lb)   Height: 180.3 cm (71\")      Body mass index is 31.8 kg/m².    Physical Exam  Vitals and nursing note reviewed.   Constitutional:       General: He is not in acute distress.     Appearance: He is well-developed.   HENT:      Head: Normocephalic and atraumatic.      Right Ear: External ear normal.      Left Ear: External ear normal.   Eyes:      General: No scleral icterus.        Right eye: No discharge.         Left eye: No discharge.      Conjunctiva/sclera: Conjunctivae normal.   Cardiovascular:      Rate and Rhythm: Normal rate and regular rhythm.      Heart sounds: Normal heart sounds. No murmur heard.   No friction rub. No gallop.    Pulmonary:      Effort: Pulmonary effort is normal. No respiratory distress.      Breath sounds: Normal breath sounds. No wheezing or rales.   Abdominal:      General: Abdomen is flat. Bowel sounds are normal. There is no distension.      Palpations: Abdomen is soft. There is no mass.      Tenderness: There is no abdominal tenderness.      Hernia: No hernia is present.   Musculoskeletal:         General: No tenderness.   Skin:     General: Skin is warm and dry.      Coloration: Skin is not pale.         Assessment / Plan      Assessment & Plan:    1. Chronic midline low back pain with left-sided sciatica  Rx for Medrol Dosepak and Flexeril.  Cautioned on drowsiness side effects of Flexeril.  Will get x-ray.  Blood pressure noted, likely elevated from pain.  - XR Spine Lumbar 2 or 3 View; Future    2. Nausea  Nausea likely from NSAID use.  Rx for Carafate and Prilosec.  Cautioned of GI side effects of steroids.      Micaeal Peter MD  05/19/2021     Please note that portions of this note may have been completed with a voice recognition program. Efforts were made to edit the dictations, but occasionally words are mistranscribed.  "

## 2021-05-20 ENCOUNTER — TELEPHONE (OUTPATIENT)
Dept: PHYSICAL THERAPY | Facility: CLINIC | Age: 51
End: 2021-05-20

## 2021-05-24 ENCOUNTER — TREATMENT (OUTPATIENT)
Dept: PHYSICAL THERAPY | Facility: CLINIC | Age: 51
End: 2021-05-24

## 2021-05-24 ENCOUNTER — HOSPITAL ENCOUNTER (OUTPATIENT)
Dept: GENERAL RADIOLOGY | Facility: HOSPITAL | Age: 51
Discharge: HOME | End: 2021-05-24
Admitting: INTERNAL MEDICINE

## 2021-05-24 DIAGNOSIS — G89.29 CHRONIC MIDLINE LOW BACK PAIN WITH LEFT-SIDED SCIATICA: ICD-10-CM

## 2021-05-24 DIAGNOSIS — M54.42 CHRONIC MIDLINE LOW BACK PAIN WITH LEFT-SIDED SCIATICA: ICD-10-CM

## 2021-05-24 DIAGNOSIS — G89.29 CHRONIC MIDLINE LOW BACK PAIN WITH LEFT-SIDED SCIATICA: Primary | ICD-10-CM

## 2021-05-24 DIAGNOSIS — M54.42 CHRONIC MIDLINE LOW BACK PAIN WITH LEFT-SIDED SCIATICA: Primary | ICD-10-CM

## 2021-05-24 PROCEDURE — 97110 THERAPEUTIC EXERCISES: CPT | Performed by: PHYSICAL THERAPIST

## 2021-05-24 PROCEDURE — 97140 MANUAL THERAPY 1/> REGIONS: CPT | Performed by: PHYSICAL THERAPIST

## 2021-05-24 PROCEDURE — 72100 X-RAY EXAM L-S SPINE 2/3 VWS: CPT

## 2021-05-24 NOTE — PROGRESS NOTES
Physical Therapy Daily Progress Note    Date: 2021    Patient: Juan C Castro  Medical Record #: [id number]  Referring Provider: Micaela Peter MD    Visit Diagnosis/ICD-10 Code: Chronic midline low back pain with left-sided sciatica [M54.42, G89.29]  Patient seen for 3 sessions    Subjective   Pt has been on steroids, states he was able to paint, mow, and yardwork the past 2 days. Last round of oral steroids was yesterday AM. States he continues to get relief with extension movements and distraction of leg.     Pain Scale (0-10): 4/10 on arrival      Objective   See Exercise, Manual and Modality logs for complete treatment.    Objective     Treatment/Procedures/Modalities:   Manual Therapy: 8 Minutes  Therapeutic Exercise: 24 Minutes   Neuromuscular Re-Education: 0 Minutes   Therapeutic Activity: 0 Minutes  Gait Trainin Minutes   Moist Heat:    Ice:    E-Stim:    Ultrasound:    Ionto:   Traction:      Timed Code Treatment: 32 Minutes  Total Treatment Time: 35 Minutes    Assessment / Plan:   Assessment/Plan   Pt demonstrates improved lumbar extension AROM with lumbar press up. He denies radicular symptoms in clinic today. Reviewed current HEP and activity modification to avoid increased LBP.     Progress per Plan of Care. Reassessment next visit. Assess response from [facility].       Corinne E. Perkins, PT  Physical Therapist

## 2021-06-01 RX ORDER — SUCRALFATE 1 G/1
TABLET ORAL
Qty: 60 TABLET | Refills: 0 | Status: SHIPPED | OUTPATIENT
Start: 2021-06-01 | End: 2022-02-21

## 2021-06-03 ENCOUNTER — TREATMENT (OUTPATIENT)
Dept: PHYSICAL THERAPY | Facility: CLINIC | Age: 51
End: 2021-06-03

## 2021-06-03 DIAGNOSIS — M54.42 CHRONIC MIDLINE LOW BACK PAIN WITH LEFT-SIDED SCIATICA: Primary | ICD-10-CM

## 2021-06-03 DIAGNOSIS — G89.29 CHRONIC MIDLINE LOW BACK PAIN WITH LEFT-SIDED SCIATICA: Primary | ICD-10-CM

## 2021-06-03 PROCEDURE — 97140 MANUAL THERAPY 1/> REGIONS: CPT | Performed by: PHYSICAL THERAPIST

## 2021-06-03 PROCEDURE — 97110 THERAPEUTIC EXERCISES: CPT | Performed by: PHYSICAL THERAPIST

## 2021-06-03 NOTE — PROGRESS NOTES
Physical Therapy Re- Assessment/Certification Note    Date: 2021    Patient: Juan C Castro  : 1970  Medical Record #: [id number]  Referring Provider: Micaela Peter MD    Visit Diagnosis/ICD-10 Code: Chronic midline low back pain with left-sided sciatica [M54.42, G89.29]  Patient seen for 4 sessions    Subjective   Pt denies numbness and tingling in BLE. States he moved over the weekend, states increased activity overall and doing okay. States he had significant relief after DN last visit.     Pain Scale (0-10): 0/10  Functional Outcome Measure: Oswestry: 12%  Clinical Progress: improved  Home Program Compliance: Yes  Treatment has included: therapeutic exercise, neuromuscular re-education, manual therapy, therapeutic activity and dry needling    Objective        Special Questions      Additional Special Questions  Denies bowel/bladder dysfunction      Postural Observations  Seated posture: fair        Neurological Testing     Sensation     Lumbar   Left   Intact: light touch    Right   Intact: light touch    Reflexes   Left   Patellar (L4): normal (2+)    Right   Patellar (L4): normal (2+)    Active Range of Motion     Lumbar   Flexion: 62 degrees   Extension: 32 degrees   Left lateral flexion: 29 degrees   Right lateral flexion: 28 degrees     Strength/Myotome Testing     Left Hip   Planes of Motion   Flexion: 4+  Abduction: 4+  Adduction: 5    Right Hip   Planes of Motion   Flexion: 5  Abduction: 4+  Adduction: 5    Left Knee   Flexion: 5  Extension: 4+    Right Knee   Flexion: 5  Extension: 5    Tests     Lumbar     Left   Negative valsalva.     Right   Negative valsalva.     Additional Tests Details  (-) SLR bilateral  Mild stretch L FABIOLA  (-) SKTC LLE  L LTR WFL    Prone tolerated , prone prop up and press up WFL        Treatment/Procedures/Modalities:   Manual Therapy: 8 Minutes  Therapeutic Exercise: 23 Minutes   Neuromuscular Re-Education: 0 Minutes   Therapeutic Activity: 0  Minutes  Gait Trainin Minutes   Moist Heat:    Ice:    E-Stim:    Ultrasound:    Ionto:   Traction:      Timed Code Treatment: 31 Minutes  Total Treatment Time: 31 Minutes    Goals:   Progress toward previous goals: Partially Met   Recommendations: Continue as planned  Timeframe: 1 month  Prognosis to achieve goals: good    Assessment & Plan     Assessment  Impairments: activity intolerance, impaired physical strength and pain with function  Assessment details: Reassessment completed today for patient referred to PT for low back pain who demonstrates signs and symptoms consistent with lumbar radiculopathy into LLE. Pt has made significant progress with improved lumbar AROM, denies radicular symptoms in clinic, and is indep with current HEP. He continues to have mild low back pain, mild neural tension LLE and taut hamstrings.  He is progressing towards functional goals as expected. Recommend further PT to progress functional strengthening, extension movements, and decrease overall pain.   Prognosis: good  Functional Limitations: carrying objects, lifting, pulling, pushing, sitting and standing  Goals  Plan Goals: Short Term Goals (4 weeks)  1. Patient is independent with HEP for flexibility and strengthening. MET  2.  Patient to report pain less than or equal to 2/10. MET  3.  Patient to report decreased pain with standing and walking for 20 min. MET   4. Patient subjectively report that altered leg symptoms have decreased by 50% with frequency or intensity. MET    Long Term Goals (8 weeks)  1. Patient will demonstrate lumbar AROM WNL in all planes to improve ability to perform daily functional activities. ONGOING  2. Patient is independent with long term HEP for improved postural awareness and stabilization. ONGOING  3. Modified Oswestry score is improved by at least 10%. MET  4.Patient will demonstrate proper lifting mechanics, utilizing abdominal stabilization. MET  5. Patient is able to tolerate at least 30  min of standing or walking to improved tolerance to ADLs. MET for walking, ongoing for standing.       Plan  Therapy options: will be seen for skilled physical therapy services  Planned modality interventions: cryotherapy, dry needling, electrical stimulation/Russian stimulation, traction, ultrasound and thermotherapy (hydrocollator packs)  Planned therapy interventions: abdominal trunk stabilization, balance/weight-bearing training, body mechanics training, flexibility, functional ROM exercises, gait training, home exercise program, joint mobilization, manual therapy, neuromuscular re-education, postural training, soft tissue mobilization, spinal/joint mobilization, strengthening, stretching and therapeutic activities  Frequency: 1x week  Duration in visits: 4  Treatment plan discussed with: patient        Progress per Plan of Care    I certify that the therapy services are furnished while this patient is under my care.  The services outlined above are required by this patient, and will be reviewed every 90 days.    Corinne E. Perkins, PT  Physical Therapist    Please sign and return via fax to [phone].. Thank you, [facility].

## 2021-06-07 ENCOUNTER — TREATMENT (OUTPATIENT)
Dept: PHYSICAL THERAPY | Facility: CLINIC | Age: 51
End: 2021-06-07

## 2021-06-07 DIAGNOSIS — M54.42 CHRONIC MIDLINE LOW BACK PAIN WITH LEFT-SIDED SCIATICA: Primary | ICD-10-CM

## 2021-06-07 DIAGNOSIS — G89.29 CHRONIC MIDLINE LOW BACK PAIN WITH LEFT-SIDED SCIATICA: Primary | ICD-10-CM

## 2021-06-07 PROCEDURE — 97140 MANUAL THERAPY 1/> REGIONS: CPT | Performed by: PHYSICAL THERAPIST

## 2021-06-07 PROCEDURE — 97110 THERAPEUTIC EXERCISES: CPT | Performed by: PHYSICAL THERAPIST

## 2021-06-07 NOTE — PROGRESS NOTES
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 States he noted increased pain yesterday after moving a book case, noted left sided low back pain and some radiating into glutes. Relief of pain with rest/sitting position quickly.     Pain Scale (0-10): 1-2/10 on arrival       Objective    Relief with prone PA glides, OP with active press ups.  See Exercise, Manual and Modality logs for complete treatment.    Objective     Treatment/Procedures/Modalities:   Manual Therapy: 8 Minutes  Therapeutic Exercise: 26 Minutes   Neuromuscular Re-Education: 0 Minutes   Therapeutic Activity: 0 Minutes  Gait Trainin Minutes   Moist Heat:    Ice:    E-Stim:    Ultrasound:    Ionto:   Traction:      Timed Code Treatment: 34 Minutes  Total Treatment Time: 38 Minutes    Assessment / Plan:   Assessment/Plan   Patient tolerated progression of standing exercise well today without reports of increased LBP. Denies radicular symptoms in clinic, relief of \"soreness\" with prone PA glides into extension. Increased LTRs noted with DN along paravertebrals today. Relief post treatment.     Progress per Plan of Care. Assess response from progressed exercises and DN. Progress HEP to include standing exercise next visit as tolerated. Trial anti core rotation movement, chop/lift.       Corinne E. Perkins, PT  Physical Therapist        "

## 2021-06-10 RX ORDER — ERGOCALCIFEROL 1.25 MG/1
CAPSULE ORAL
Qty: 12 CAPSULE | Refills: 0 | Status: SHIPPED | OUTPATIENT
Start: 2021-06-10 | End: 2021-07-16

## 2021-06-14 ENCOUNTER — TREATMENT (OUTPATIENT)
Dept: PHYSICAL THERAPY | Facility: CLINIC | Age: 51
End: 2021-06-14

## 2021-06-14 DIAGNOSIS — M54.42 CHRONIC MIDLINE LOW BACK PAIN WITH LEFT-SIDED SCIATICA: Primary | ICD-10-CM

## 2021-06-14 DIAGNOSIS — G89.29 CHRONIC MIDLINE LOW BACK PAIN WITH LEFT-SIDED SCIATICA: Primary | ICD-10-CM

## 2021-06-14 PROCEDURE — 97530 THERAPEUTIC ACTIVITIES: CPT | Performed by: PHYSICAL THERAPIST

## 2021-06-14 PROCEDURE — 97110 THERAPEUTIC EXERCISES: CPT | Performed by: PHYSICAL THERAPIST

## 2021-06-14 NOTE — PROGRESS NOTES
Physical Therapy Daily Progress Note    Date: 2021    Patient: Juan C Castro  Medical Record #: [id number]  Referring Provider: Micaela Peter MD    Visit Diagnosis/ICD-10 Code: Chronic midline low back pain with left-sided sciatica [M54.42, G89.29]  Patient seen for 6 sessions    Subjective   Pt denies radicular symptoms in legs, states his low back has been feeling better overall and has tolerated increased activity with moving thing in home, garage, etc. He states he is pleased with his progress so far. 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    Pain Scale (0-10): 0/10 on arrival      Objective   See Exercise, Manual and Modality logs for complete treatment.    Objective     Treatment/Procedures/Modalities:   Manual Therapy: 0 Minutes  Therapeutic Exercise: 25 Minutes   Neuromuscular Re-Education: 0 Minutes   Therapeutic Activity: 10 Minutes  Gait Trainin Minutes   Moist Heat:    Ice:    E-Stim:    Ultrasound:    Ionto:   Traction:      Timed Code Treatment: 35 Minutes  Total Treatment Time: 35 Minutes    Assessment / Plan:   Assessment/Plan   Pt is making steady progress towards his functional goals, demonstrates improved lumbar extension and flexion AROM pain free, denies radicular symptoms. He is compliant with current HEP, mild fatigue in BLE with progressed exercises today.     Progress per Plan of Care . Held DN today, pt denies pain or radicular symptoms. Progress written HEP next visit, could consider d/c in next few visits. Assess gait for jogging next visit. Add antic ore rotation movement, chop/lift patterns for core.       Corinne E. Perkins, PT  Physical Therapist

## 2021-06-17 DIAGNOSIS — E78.2 MIXED HYPERLIPIDEMIA: ICD-10-CM

## 2021-06-17 DIAGNOSIS — I10 ESSENTIAL HYPERTENSION: ICD-10-CM

## 2021-06-17 RX ORDER — LISINOPRIL 30 MG/1
TABLET ORAL
Qty: 90 TABLET | Refills: 1 | Status: SHIPPED | OUTPATIENT
Start: 2021-06-17 | End: 2021-12-20

## 2021-06-17 RX ORDER — ATORVASTATIN CALCIUM 20 MG/1
20 TABLET, FILM COATED ORAL DAILY
Qty: 90 TABLET | Refills: 1 | Status: SHIPPED | OUTPATIENT
Start: 2021-06-17 | End: 2021-12-20

## 2021-06-21 ENCOUNTER — TELEPHONE (OUTPATIENT)
Dept: PHYSICAL THERAPY | Facility: CLINIC | Age: 51
End: 2021-06-21

## 2021-06-28 ENCOUNTER — TREATMENT (OUTPATIENT)
Dept: PHYSICAL THERAPY | Facility: CLINIC | Age: 51
End: 2021-06-28

## 2021-06-28 DIAGNOSIS — G89.29 CHRONIC MIDLINE LOW BACK PAIN WITH LEFT-SIDED SCIATICA: Primary | ICD-10-CM

## 2021-06-28 DIAGNOSIS — M54.42 CHRONIC MIDLINE LOW BACK PAIN WITH LEFT-SIDED SCIATICA: Primary | ICD-10-CM

## 2021-06-28 PROCEDURE — 97110 THERAPEUTIC EXERCISES: CPT | Performed by: PHYSICAL THERAPIST

## 2021-06-28 PROCEDURE — 97530 THERAPEUTIC ACTIVITIES: CPT | Performed by: PHYSICAL THERAPIST

## 2021-06-28 NOTE — PROGRESS NOTES
"Physical Therapy Re- Assessment/Certification/ Discharge Note    Date: 2021    Patient: Juan C Castro  : 1970  Medical Record #: [id number]  Referring Provider: Micaela Peter MD    Visit Diagnosis/ICD-10 Code: Chronic midline low back pain with left-sided sciatica [M54.42, G89.29]  Patient seen for 7 sessions    Subjective   Patient states this weekend was a good test for his low back pain, states he was moving heavy loads of rock in his yard. States he felt \"one twinge of pain\" that resolved quickly.     Pain Scale (0-10): 0/10  Functional Outcome Measure: Oswestry: 0  Clinical Progress: improved  Home Program Compliance: Yes  Treatment has included: therapeutic exercise, manual therapy, therapeutic activity and dry needling    Objective        Special Questions      Additional Special Questions  Denies bowel/bladder dysfunction      Postural Observations  Seated posture: fair        Neurological Testing     Sensation     Lumbar   Left   Intact: light touch    Right   Intact: light touch    Reflexes   Left   Patellar (L4): normal (2+)    Right   Patellar (L4): normal (2+)    Active Range of Motion     Lumbar   Flexion: 62 degrees   Extension: 32 degrees   Left lateral flexion: 29 degrees   Right lateral flexion: 28 degrees     Strength/Myotome Testing     Left Hip   Planes of Motion   Flexion: 4+  Abduction: 4+  Adduction: 5    Right Hip   Planes of Motion   Flexion: 5  Abduction: 4+  Adduction: 5    Left Knee   Flexion: 5  Extension: 4+    Right Knee   Flexion: 5  Extension: 5    Tests     Lumbar     Left   Negative valsalva.     Right   Negative valsalva.     Additional Tests Details  (-) SLR bilateral  (-) SKTC LLE  L LTR WFL    Prone tolerated , prone prop up and press up WFL        Treatment/Procedures/Modalities:   Manual Therapy: 0 Minutes  Therapeutic Exercise: 24 Minutes   Neuromuscular Re-Education: 0 Minutes   Therapeutic Activity: 8 Minutes  Gait Trainin Minutes "   Moist Heat:    Ice:    E-Stim:    Ultrasound:    Ionto:   Traction:      Timed Code Treatment: 32 Minutes  Total Treatment Time: 32 Minutes    Goals:   Progress toward previous goals: Partially Met   Recommendations: Continue as planned  Timeframe: 1 month  Prognosis to achieve goals: good    Assessment & Plan     Assessment  Assessment details: Reassessment completed today for patient referred to PT for low back pain who demonstrates signs and symptoms consistent with lumbar radiculopathy into LLE. Pt demonstrates functional lumbar AROM, denies radicular symptoms in clinic, and is indep with current HEP. He denies low back pain, improved LLE neural flexibility noted. Pt has met his functional goals and is appropriate to d/c to home with HEP.    Prognosis: good    Goals  Plan Goals: Short Term Goals (4 weeks)  1. Patient is independent with HEP for flexibility and strengthening. MET  2.  Patient to report pain less than or equal to 2/10. MET  3.  Patient to report decreased pain with standing and walking for 20 min. MET   4. Patient subjectively report that altered leg symptoms have decreased by 50% with frequency or intensity. MET    Long Term Goals (8 weeks)  1. Patient will demonstrate lumbar AROM WNL in all planes to improve ability to perform daily functional activities. MET  2. Patient is independent with long term HEP for improved postural awareness and stabilization. MET  3. Modified Oswestry score is improved by at least 10%. MET  4.Patient will demonstrate proper lifting mechanics, utilizing abdominal stabilization. MET  5. Patient is able to tolerate at least 30 min of standing or walking to improved tolerance to ADLs. MET       Plan  Treatment plan discussed with: patient  Plan details: D/c from OPPT to home with HEP due to meeting goals.           I certify that the therapy services are furnished while this patient is under my care.  The services outlined above are required by this patient, and will  be reviewed every 90 days.    Corinne E. Perkins, PT  Physical Therapist    Please sign and return via fax to [phone].. Thank you, [facility].

## 2021-06-29 NOTE — PATIENT INSTRUCTIONS
Reviewed HEP and progression for home. Educated on option of self pay DN if needed in future for symptoms. Reviewed soccer mechanics and modifications for low back.

## 2021-07-02 ENCOUNTER — OFFICE VISIT (OUTPATIENT)
Dept: INTERNAL MEDICINE | Facility: CLINIC | Age: 51
End: 2021-07-02

## 2021-07-02 ENCOUNTER — LAB (OUTPATIENT)
Dept: LAB | Facility: HOSPITAL | Age: 51
End: 2021-07-02

## 2021-07-02 VITALS
RESPIRATION RATE: 20 BRPM | BODY MASS INDEX: 31.64 KG/M2 | OXYGEN SATURATION: 99 % | HEIGHT: 71 IN | DIASTOLIC BLOOD PRESSURE: 78 MMHG | WEIGHT: 226 LBS | SYSTOLIC BLOOD PRESSURE: 116 MMHG | HEART RATE: 74 BPM | TEMPERATURE: 97.2 F

## 2021-07-02 DIAGNOSIS — I10 ESSENTIAL HYPERTENSION: Primary | ICD-10-CM

## 2021-07-02 DIAGNOSIS — E55.9 VITAMIN D DEFICIENCY: ICD-10-CM

## 2021-07-02 DIAGNOSIS — F41.9 ANXIETY: ICD-10-CM

## 2021-07-02 DIAGNOSIS — D17.1 LIPOMA OF TORSO: ICD-10-CM

## 2021-07-02 LAB
25(OH)D3 SERPL-MCNC: 46.1 NG/ML
ALBUMIN SERPL-MCNC: 4.4 G/DL (ref 3.5–5.2)
ALBUMIN/GLOB SERPL: 1.5 G/DL
ALP SERPL-CCNC: 102 U/L (ref 39–117)
ALT SERPL W P-5'-P-CCNC: 20 U/L (ref 1–41)
ANION GAP SERPL CALCULATED.3IONS-SCNC: 10.5 MMOL/L (ref 5–15)
AST SERPL-CCNC: 23 U/L (ref 1–40)
BILIRUB SERPL-MCNC: 0.5 MG/DL (ref 0–1.2)
BUN SERPL-MCNC: 10 MG/DL (ref 6–20)
BUN/CREAT SERPL: 8 (ref 7–25)
CALCIUM SPEC-SCNC: 9.4 MG/DL (ref 8.6–10.5)
CHLORIDE SERPL-SCNC: 99 MMOL/L (ref 98–107)
CO2 SERPL-SCNC: 22.5 MMOL/L (ref 22–29)
CREAT SERPL-MCNC: 1.25 MG/DL (ref 0.76–1.27)
GFR SERPL CREATININE-BSD FRML MDRD: 61 ML/MIN/1.73
GLOBULIN UR ELPH-MCNC: 2.9 GM/DL
GLUCOSE SERPL-MCNC: 109 MG/DL (ref 65–99)
POTASSIUM SERPL-SCNC: 4.7 MMOL/L (ref 3.5–5.2)
PROT SERPL-MCNC: 7.3 G/DL (ref 6–8.5)
SODIUM SERPL-SCNC: 132 MMOL/L (ref 136–145)

## 2021-07-02 PROCEDURE — 80053 COMPREHEN METABOLIC PANEL: CPT | Performed by: INTERNAL MEDICINE

## 2021-07-02 PROCEDURE — 99214 OFFICE O/P EST MOD 30 MIN: CPT | Performed by: INTERNAL MEDICINE

## 2021-07-02 PROCEDURE — 82306 VITAMIN D 25 HYDROXY: CPT | Performed by: INTERNAL MEDICINE

## 2021-07-02 RX ORDER — LORAZEPAM 0.5 MG/1
0.5 TABLET ORAL EVERY 8 HOURS PRN
Qty: 14 TABLET | Refills: 0 | Status: SHIPPED | OUTPATIENT
Start: 2021-07-02 | End: 2022-01-19 | Stop reason: SDUPTHER

## 2021-07-02 NOTE — PROGRESS NOTES
"     Office Note      Date: 2021  Patient Name: Juan C Castro  MRN: [id number]  : 1970    Chief Complaint   Patient presents with   • Hypertension   • Anxiety       History of Present Illness: Juan C Castro is a 51 y.o. male who presents for Hypertension and Anxiety.     Has noticed a mass on the lower left side of back.  Nontender.  Needs refill of Ativan.  Hypertension-on lisinopril daily.  Took blood pressure medicine this morning.  Vitamin D deficiency-started on 12-week course of high-dose vitamin D last visit.  Subjective      Review of Systems:   Pertinent review of systems per HPI.    Review of Systems   Constitutional: Negative for activity change, appetite change, chills, diaphoresis and fatigue.   HENT: Negative for congestion, dental problem, drooling, ear discharge, ear pain, facial swelling and hearing loss.    Eyes: Negative for photophobia, pain, discharge, redness, itching and visual disturbance.   Respiratory: Negative for cough, choking, chest tightness and shortness of breath.    Cardiovascular: Negative for chest pain, palpitations and leg swelling.   Endocrine: Negative for cold intolerance, heat intolerance, polydipsia and polyuria.   Genitourinary: Negative for difficulty urinating, dysuria, flank pain, frequency and hematuria.   Musculoskeletal: Negative for arthralgias and back pain.   Skin: Negative for color change, pallor, rash and wound.   Allergic/Immunologic: Negative for environmental allergies.   Neurological: Negative for dizziness, facial asymmetry, light-headedness and headaches.   Psychiatric/Behavioral: Negative.    All other systems reviewed and are negative.    No Known Allergies    Objective     Physical Exam:  Vital Signs:   Vitals:    21 1140   BP: 116/78   Pulse: 74   Resp: 20   Temp: 97.2 °F (36.2 °C)   TempSrc: Temporal   SpO2: 99%   Weight: 103 kg (226 lb)   Height: 180.3 cm (71\")      Body mass index is 31.52 kg/m².    Physical " Exam  Vitals and nursing note reviewed.   Constitutional:       General: He is not in acute distress.     Appearance: He is well-developed.   HENT:      Head: Normocephalic and atraumatic.      Right Ear: External ear normal.      Left Ear: External ear normal.   Eyes:      General: No scleral icterus.        Right eye: No discharge.         Left eye: No discharge.      Conjunctiva/sclera: Conjunctivae normal.   Cardiovascular:      Rate and Rhythm: Normal rate and regular rhythm.      Heart sounds: Normal heart sounds. No murmur heard.   No friction rub. No gallop.    Pulmonary:      Effort: Pulmonary effort is normal. No respiratory distress.      Breath sounds: Normal breath sounds. No wheezing or rales.   Musculoskeletal:      Comments: Soft, slightly raised lower left back about 3 inches   Skin:     General: Skin is warm and dry.      Coloration: Skin is not pale.         Assessment / Plan      Assessment & Plan:    1. Essential hypertension  Chronic medical issue, blood pressure well controlled today.  Checking renal function.  Continue lisinopril  - Comprehensive Metabolic Panel    2. Vitamin D deficiency  Vitamin D level has normalized status post vitamin D.  - Vitamin D 25 Hydroxy    3. Lipoma of torso  Ultrasound ordered and resulted negative for any masses, continue to monitor for signs of growth.  - US abdomen limited; Future    4. Anxiety  Chronic medical issue and stable.  - LORazepam (Ativan) 0.5 MG tablet; Take 1 tablet by mouth Every 8 (Eight) Hours As Needed for Anxiety.  Dispense: 14 tablet; Refill: 0      Micaela Peter MD  07/02/2021     Please note that portions of this note may have been completed with a voice recognition program. Efforts were made to edit the dictations, but occasionally words are mistranscribed.

## 2021-07-15 ENCOUNTER — HOSPITAL ENCOUNTER (OUTPATIENT)
Dept: ULTRASOUND IMAGING | Facility: HOSPITAL | Age: 51
Discharge: HOME | End: 2021-07-15
Admitting: INTERNAL MEDICINE

## 2021-07-15 DIAGNOSIS — D17.1 LIPOMA OF TORSO: ICD-10-CM

## 2021-07-15 PROCEDURE — 76705 ECHO EXAM OF ABDOMEN: CPT

## 2021-07-16 ENCOUNTER — TELEPHONE (OUTPATIENT)
Dept: INTERNAL MEDICINE | Facility: CLINIC | Age: 51
End: 2021-07-16

## 2021-07-16 NOTE — TELEPHONE ENCOUNTER
[facility] at [phone] for the patient to return our call, office number was provided      HUB TO READ: Please provide the below provider results to the patient. Thank you!     ----- Message from Micaela Peter MD sent at 7/16/2021 12:15 PM EDT -----  Ultrasound negative for lipoma or any other masses.  Continue to monitor for signs of growth.

## 2021-09-07 RX ORDER — ERGOCALCIFEROL 1.25 MG/1
CAPSULE ORAL
Qty: 12 CAPSULE | Refills: 0 | OUTPATIENT
Start: 2021-09-07

## 2021-10-26 RX ORDER — OMEPRAZOLE 40 MG/1
40 CAPSULE, DELAYED RELEASE ORAL DAILY
Qty: 30 CAPSULE | Refills: 5 | Status: SHIPPED | OUTPATIENT
Start: 2021-10-26 | End: 2021-12-15 | Stop reason: SDUPTHER

## 2021-12-15 RX ORDER — OMEPRAZOLE 40 MG/1
40 CAPSULE, DELAYED RELEASE ORAL DAILY
Qty: 30 CAPSULE | Refills: 5 | Status: SHIPPED | OUTPATIENT
Start: 2021-12-15 | End: 2022-06-15

## 2021-12-15 NOTE — TELEPHONE ENCOUNTER
Caller: Juan C Castro    Relationship: Self    Best call back number:[phone]     Requested Prescriptions:   Requested Prescriptions     Pending Prescriptions Disp Refills   • omeprazole (priLOSEC) 40 MG capsule 30 capsule 5     Sig: Take 1 capsule by mouth Daily.        Pharm[facility] be [id number] [address] [address]  [phone] [facility] [phone] FX     Additional details provided by patient: PATIENT IS OUT OF MEDICATION    Does the patient have less than a 3 day supply:  [x] Yes  [] No    Gustavo Bowden Rep   12/15/21 10:37 EST

## 2021-12-20 DIAGNOSIS — E78.2 MIXED HYPERLIPIDEMIA: ICD-10-CM

## 2021-12-20 DIAGNOSIS — I10 ESSENTIAL HYPERTENSION: ICD-10-CM

## 2021-12-20 RX ORDER — ATORVASTATIN CALCIUM 20 MG/1
20 TABLET, FILM COATED ORAL DAILY
Qty: 30 TABLET | Refills: 0 | Status: SHIPPED | OUTPATIENT
Start: 2021-12-20 | End: 2021-12-30

## 2021-12-20 RX ORDER — LISINOPRIL 30 MG/1
TABLET ORAL
Qty: 90 TABLET | Refills: 0 | Status: SHIPPED | OUTPATIENT
Start: 2021-12-20 | End: 2022-02-21 | Stop reason: SDUPTHER

## 2021-12-30 DIAGNOSIS — E78.2 MIXED HYPERLIPIDEMIA: ICD-10-CM

## 2021-12-30 RX ORDER — ATORVASTATIN CALCIUM 20 MG/1
20 TABLET, FILM COATED ORAL DAILY
Qty: 30 TABLET | Refills: 0 | Status: SHIPPED | OUTPATIENT
Start: 2021-12-30 | End: 2022-02-21 | Stop reason: SDUPTHER

## 2022-01-19 DIAGNOSIS — F41.9 ANXIETY: ICD-10-CM

## 2022-01-19 NOTE — TELEPHONE ENCOUNTER
Caller: Juan C Castro    Relationship: Self    Best call back number: [phone]     Requested Prescriptions:   Requested Prescriptions     Pending Prescriptions Disp Refills   • LORazepam (Ativan) 0.5 MG tablet 14 tablet 0     Sig: Take 1 tablet by mouth Every 8 (Eight) Hours As Needed for Anxiety.        Pharm[facility] where request should be sent: [id number] [address] [place] [address]  [phone] [facility] [phone] FX     Additional details provided by patient: PATIENT STATED THAT HE IS OUT OF MEDICATION.  PATIENT IS CURRENTLY HAVING SOME ANXIETY.    Does the patient have less than a 3 day supply:  [x] Yes  [] No    Gustavo Bowden Rep   01/19/22 09:08 EST

## 2022-01-21 RX ORDER — LORAZEPAM 0.5 MG/1
0.5 TABLET ORAL EVERY 8 HOURS PRN
Qty: 14 TABLET | Refills: 0 | Status: SHIPPED | OUTPATIENT
Start: 2022-01-21

## 2022-02-21 ENCOUNTER — OFFICE VISIT (OUTPATIENT)
Dept: INTERNAL MEDICINE | Facility: CLINIC | Age: 52
End: 2022-02-21

## 2022-02-21 VITALS
WEIGHT: 236 LBS | BODY MASS INDEX: 33.04 KG/M2 | OXYGEN SATURATION: 97 % | HEART RATE: 67 BPM | TEMPERATURE: 97.6 F | HEIGHT: 71 IN | SYSTOLIC BLOOD PRESSURE: 134 MMHG | DIASTOLIC BLOOD PRESSURE: 82 MMHG

## 2022-02-21 DIAGNOSIS — E66.09 CLASS 1 OBESITY DUE TO EXCESS CALORIES WITH SERIOUS COMORBIDITY AND BODY MASS INDEX (BMI) OF 32.0 TO 32.9 IN ADULT: ICD-10-CM

## 2022-02-21 DIAGNOSIS — Z72.0 TOBACCO ABUSE: ICD-10-CM

## 2022-02-21 DIAGNOSIS — N52.9 ERECTILE DYSFUNCTION, UNSPECIFIED ERECTILE DYSFUNCTION TYPE: Primary | ICD-10-CM

## 2022-02-21 DIAGNOSIS — E78.2 MIXED HYPERLIPIDEMIA: ICD-10-CM

## 2022-02-21 DIAGNOSIS — I10 ESSENTIAL HYPERTENSION: ICD-10-CM

## 2022-02-21 PROCEDURE — 99214 OFFICE O/P EST MOD 30 MIN: CPT | Performed by: INTERNAL MEDICINE

## 2022-02-21 RX ORDER — SILDENAFIL 25 MG/1
25 TABLET, FILM COATED ORAL DAILY PRN
Qty: 30 TABLET | Refills: 1 | Status: SHIPPED | OUTPATIENT
Start: 2022-02-21

## 2022-02-21 RX ORDER — VARENICLINE TARTRATE 1 MG/1
1 TABLET, FILM COATED ORAL 2 TIMES DAILY
Qty: 56 TABLET | Refills: 1 | Status: SHIPPED | OUTPATIENT
Start: 2022-03-21 | End: 2022-05-16

## 2022-02-21 RX ORDER — LISINOPRIL 30 MG/1
30 TABLET ORAL DAILY
Qty: 90 TABLET | Refills: 1 | Status: SHIPPED | OUTPATIENT
Start: 2022-02-21 | End: 2022-03-28

## 2022-02-21 RX ORDER — ATORVASTATIN CALCIUM 20 MG/1
20 TABLET, FILM COATED ORAL DAILY
Qty: 90 TABLET | Refills: 2 | Status: SHIPPED | OUTPATIENT
Start: 2022-02-21 | End: 2022-07-08 | Stop reason: SDUPTHER

## 2022-02-21 NOTE — PROGRESS NOTES
"     Office Note      Date: 2022  Patient Name: Juan C Castro  MRN: [id number]  : 1970    Chief Complaint   Patient presents with   • Hypertension       History of Present Illness: Juan C Castro is a 52 y.o. male who presents for Hypertension. Has gained weight due to inactivity. Smoking and would to quit. Has tried chantix in the past with success in quitting.   Has difficulty maintaining an erection.   Needs refills of lipitor and lisinopril.     Subjective      Review of Systems:   Pertinent review of systems per HPI.    Review of Systems   Constitutional: Negative for activity change, appetite change, chills, diaphoresis and fatigue.   HENT: Negative for congestion, dental problem, drooling, ear discharge, ear pain, facial swelling and hearing loss.    Eyes: Negative for photophobia, pain, discharge, redness, itching and visual disturbance.   Respiratory: Negative for cough, choking, chest tightness and shortness of breath.    Cardiovascular: Negative for chest pain, palpitations and leg swelling.   Endocrine: Negative for cold intolerance, heat intolerance, polydipsia and polyuria.   Genitourinary: Negative for difficulty urinating, dysuria, flank pain, frequency and hematuria.   Musculoskeletal: Negative for arthralgias and back pain.   Skin: Negative for color change, pallor, rash and wound.   Allergic/Immunologic: Negative for environmental allergies.   Neurological: Negative for dizziness, facial asymmetry, light-headedness and headaches.   Psychiatric/Behavioral: Negative.    All other systems reviewed and are negative.    No Known Allergies    Objective     Physical Exam:  Vital Signs:   Vitals:    22 0922   BP: 134/82   Pulse: 67   Temp: 97.6 °F (36.4 °C)   TempSrc: Temporal   SpO2: 97%   Weight: 107 kg (236 lb)   Height: 180.3 cm (71\")      Body mass index is 32.92 kg/m².    Physical Exam  Vitals and nursing note reviewed.   Constitutional:       General: He is " not in acute distress.     Appearance: He is well-developed.   HENT:      Head: Normocephalic and atraumatic.      Right Ear: External ear normal.      Left Ear: External ear normal.   Eyes:      General: No scleral icterus.        Right eye: No discharge.         Left eye: No discharge.      Conjunctiva/sclera: Conjunctivae normal.   Cardiovascular:      Rate and Rhythm: Normal rate and regular rhythm.      Heart sounds: Normal heart sounds. No murmur heard.  No friction rub. No gallop.    Pulmonary:      Effort: Pulmonary effort is normal. No respiratory distress.      Breath sounds: Normal breath sounds. No wheezing or rales.   Skin:     General: Skin is warm and dry.      Coloration: Skin is not pale.         Assessment / Plan      Assessment & Plan:    1. Mixed hyperlipidemia  Discussed weight loss, refilled lipitor. Check lipid panel at annual  - atorvastatin (LIPITOR) 20 MG tablet; Take 1 tablet by mouth Daily.  Dispense: 90 tablet; Refill: 2    2. Essential hypertension  Chronic medical issue and stable. Refilled lisinopril  - lisinopril (PRINIVIL,ZESTRIL) 30 MG tablet; Take 1 tablet by mouth Daily.  Dispense: 90 tablet; Refill: 1    3. Erectile dysfunction, unspecified erectile dysfunction type  Start on viagra. Discussed SE including priaprism    4. Class 1 obesity due to excess calories with serious comorbidity and body mass index (BMI) of 32.0 to 32.9 in adult  Advised weight loss,exercise and diet. Goal weight loss 1 lb per week. Reassess in 3 months.     5. Tobacco abuse  rx for chantix. Reassess in 3 months.       Micaela Peter MD  02/21/2022

## 2022-03-26 DIAGNOSIS — I10 ESSENTIAL HYPERTENSION: ICD-10-CM

## 2022-03-28 RX ORDER — LISINOPRIL 30 MG/1
30 TABLET ORAL DAILY
Qty: 90 TABLET | Refills: 1 | Status: SHIPPED | OUTPATIENT
Start: 2022-03-28 | End: 2022-07-08 | Stop reason: SDUPTHER

## 2022-05-26 ENCOUNTER — LAB (OUTPATIENT)
Dept: LAB | Facility: HOSPITAL | Age: 52
End: 2022-05-26

## 2022-05-26 ENCOUNTER — OFFICE VISIT (OUTPATIENT)
Dept: INTERNAL MEDICINE | Facility: CLINIC | Age: 52
End: 2022-05-26

## 2022-05-26 VITALS
RESPIRATION RATE: 16 BRPM | OXYGEN SATURATION: 98 % | BODY MASS INDEX: 32.76 KG/M2 | HEIGHT: 71 IN | HEART RATE: 81 BPM | DIASTOLIC BLOOD PRESSURE: 84 MMHG | WEIGHT: 234 LBS | SYSTOLIC BLOOD PRESSURE: 118 MMHG | TEMPERATURE: 97.6 F

## 2022-05-26 DIAGNOSIS — F41.9 ANXIETY: ICD-10-CM

## 2022-05-26 DIAGNOSIS — Z72.0 TOBACCO ABUSE: ICD-10-CM

## 2022-05-26 DIAGNOSIS — Z00.00 ANNUAL PHYSICAL EXAM: Primary | ICD-10-CM

## 2022-05-26 DIAGNOSIS — J30.2 SEASONAL ALLERGIES: ICD-10-CM

## 2022-05-26 DIAGNOSIS — I10 ESSENTIAL HYPERTENSION: ICD-10-CM

## 2022-05-26 DIAGNOSIS — Z12.5 SCREENING FOR PROSTATE CANCER: ICD-10-CM

## 2022-05-26 DIAGNOSIS — E78.2 MIXED HYPERLIPIDEMIA: ICD-10-CM

## 2022-05-26 DIAGNOSIS — E66.09 CLASS 1 OBESITY DUE TO EXCESS CALORIES WITH SERIOUS COMORBIDITY AND BODY MASS INDEX (BMI) OF 32.0 TO 32.9 IN ADULT: ICD-10-CM

## 2022-05-26 LAB
DEPRECATED RDW RBC AUTO: 44.8 FL (ref 37–54)
ERYTHROCYTE [DISTWIDTH] IN BLOOD BY AUTOMATED COUNT: 12.7 % (ref 12.3–15.4)
HCT VFR BLD AUTO: 48.7 % (ref 37.5–51)
HGB BLD-MCNC: 16.5 G/DL (ref 13–17.7)
MCH RBC QN AUTO: 32.5 PG (ref 26.6–33)
MCHC RBC AUTO-ENTMCNC: 33.9 G/DL (ref 31.5–35.7)
MCV RBC AUTO: 96.1 FL (ref 79–97)
PLATELET # BLD AUTO: 217 10*3/MM3 (ref 140–450)
PMV BLD AUTO: 10.8 FL (ref 6–12)
RBC # BLD AUTO: 5.07 10*6/MM3 (ref 4.14–5.8)
WBC NRBC COR # BLD: 8.63 10*3/MM3 (ref 3.4–10.8)

## 2022-05-26 PROCEDURE — 82607 VITAMIN B-12: CPT | Performed by: INTERNAL MEDICINE

## 2022-05-26 PROCEDURE — 80061 LIPID PANEL: CPT | Performed by: INTERNAL MEDICINE

## 2022-05-26 PROCEDURE — 83036 HEMOGLOBIN GLYCOSYLATED A1C: CPT | Performed by: INTERNAL MEDICINE

## 2022-05-26 PROCEDURE — 99396 PREV VISIT EST AGE 40-64: CPT | Performed by: INTERNAL MEDICINE

## 2022-05-26 PROCEDURE — 99214 OFFICE O/P EST MOD 30 MIN: CPT | Performed by: INTERNAL MEDICINE

## 2022-05-26 PROCEDURE — 80050 GENERAL HEALTH PANEL: CPT | Performed by: INTERNAL MEDICINE

## 2022-05-26 PROCEDURE — G0103 PSA SCREENING: HCPCS | Performed by: INTERNAL MEDICINE

## 2022-05-26 NOTE — PROGRESS NOTES
Office Note      Date: 2022  Patient Name: Juan C Castro  MRN: [id number]  : 1970    Chief Complaint   Patient presents with   • Hypertension   • Hyperlipidemia       History of Present Illness: Juan C Castro is a 52 y.o. male who presents for Hypertension and Hyperlipidemia.  Would like annual physical exam labs today.  Has lost weight from last visit.  Unable to tolerate chantix. Has started vaping.   Having URI sx. Unable to tolerate allergy pills due to drowsiness.  Prescribed short-term Ativan due to panic attacks from recent passing of his father, as requesting refills once a year.  Last refill 2022.  Requesting another refill due to upcoming trip to [place].  On lisinopril for blood pressure.    Subjective      Review of Systems:   Pertinent review of systems per HPI.    Review of Systems   Constitutional: Negative for activity change, appetite change, chills, diaphoresis and fatigue.   HENT: Negative for congestion, dental problem, drooling, ear discharge, ear pain, facial swelling and hearing loss.    Eyes: Negative for photophobia, pain, discharge, redness, itching and visual disturbance.   Respiratory: Negative for cough, choking, chest tightness and shortness of breath.    Cardiovascular: Negative for chest pain, palpitations and leg swelling.   Endocrine: Negative for cold intolerance, heat intolerance, polydipsia and polyuria.   Genitourinary: Negative for difficulty urinating, dysuria, flank pain, frequency and hematuria.   Musculoskeletal: Negative for arthralgias and back pain.   Skin: Negative for color change, pallor, rash and wound.   Allergic/Immunologic: Negative for environmental allergies.   Neurological: Negative for dizziness, facial asymmetry, light-headedness and headaches.   Psychiatric/Behavioral: Negative.    All other systems reviewed and are negative.    No Known Allergies    Objective     Physical Exam:  Vital Signs:   Vitals:     "05/26/22 0936   BP: 118/84   Pulse: 81   Resp: 16   Temp: 97.6 °F (36.4 °C)   TempSrc: Temporal   SpO2: 98%   Weight: 106 kg (234 lb)   Height: 180.3 cm (71\")      Body mass index is 32.64 kg/m².    Physical Exam  Vitals and nursing note reviewed.   Constitutional:       General: He is not in acute distress.     Appearance: He is well-developed.   HENT:      Head: Normocephalic and atraumatic.      Right Ear: External ear normal.      Left Ear: External ear normal.   Eyes:      General: No scleral icterus.        Right eye: No discharge.         Left eye: No discharge.      Conjunctiva/sclera: Conjunctivae normal.   Cardiovascular:      Rate and Rhythm: Normal rate and regular rhythm.      Heart sounds: Normal heart sounds. No murmur heard.    No friction rub. No gallop.   Pulmonary:      Effort: Pulmonary effort is normal. No respiratory distress.      Breath sounds: Normal breath sounds. No wheezing or rales.   Skin:     General: Skin is warm and dry.      Coloration: Skin is not pale.         Assessment / Plan      Assessment & Plan:    1. Annual physical exam  Counseled on:    Colonoscopy at 45 y/o  PNA vaccinations starting at age 65  shingrix at age 51  Td/Tdap q 10 years  Wear seatbelt when driving  Flu shot annually    - CBC (No Diff)  - Comprehensive Metabolic Panel  - Lipid Panel  - TSH Rfx On Abnormal To Free T4  - Vitamin B12  - Hemoglobin A1c    2. Screening for prostate cancer    - PSA Screen    3. Essential hypertension  Chronic medical issue well-controlled.  Continue lisinopril 10 mg once daily.    4. Mixed hyperlipidemia  Chronic medical issue, repeat lipid panel today.  Continue atorvastatin 20    5. Class 1 obesity due to excess calories with serious comorbidity and body mass index (BMI) of 32.0 to 32.9 in adult  Encouraged continued weight loss and exercise.    6. Tobacco abuse  Discussed decreasing nicotine content and vaping.  Continue to monitor    7. Seasonal allergies  Advised nondrowsy " allergy pill such as Claritin or Zyrtec.    8. Anxiety  Patient refilled Ativan earlier this year.  Discussed that this is not her medication.  If he would like to continue this chronically will need to see psychiatry.      Micaela Peter MD  05/26/2022

## 2022-05-27 LAB
ALBUMIN SERPL-MCNC: 4.5 G/DL (ref 3.5–5.2)
ALBUMIN/GLOB SERPL: 1.7 G/DL
ALP SERPL-CCNC: 126 U/L (ref 39–117)
ALT SERPL W P-5'-P-CCNC: 26 U/L (ref 1–41)
ANION GAP SERPL CALCULATED.3IONS-SCNC: 14.8 MMOL/L (ref 5–15)
AST SERPL-CCNC: 26 U/L (ref 1–40)
BILIRUB SERPL-MCNC: 0.3 MG/DL (ref 0–1.2)
BUN SERPL-MCNC: 9 MG/DL (ref 6–20)
BUN/CREAT SERPL: 7 (ref 7–25)
CALCIUM SPEC-SCNC: 9.4 MG/DL (ref 8.6–10.5)
CHLORIDE SERPL-SCNC: 100 MMOL/L (ref 98–107)
CHOLEST SERPL-MCNC: 157 MG/DL (ref 0–200)
CO2 SERPL-SCNC: 18.2 MMOL/L (ref 22–29)
CREAT SERPL-MCNC: 1.29 MG/DL (ref 0.76–1.27)
EGFRCR SERPLBLD CKD-EPI 2021: 67.1 ML/MIN/1.73
GLOBULIN UR ELPH-MCNC: 2.6 GM/DL
GLUCOSE SERPL-MCNC: 102 MG/DL (ref 65–99)
HBA1C MFR BLD: 6.4 % (ref 4.8–5.6)
HDLC SERPL-MCNC: 32 MG/DL (ref 40–60)
LDLC SERPL CALC-MCNC: 95 MG/DL (ref 0–100)
LDLC/HDLC SERPL: 2.84 {RATIO}
POTASSIUM SERPL-SCNC: 4.8 MMOL/L (ref 3.5–5.2)
PROT SERPL-MCNC: 7.1 G/DL (ref 6–8.5)
PSA SERPL-MCNC: 1.01 NG/ML (ref 0–4)
SODIUM SERPL-SCNC: 133 MMOL/L (ref 136–145)
TRIGL SERPL-MCNC: 171 MG/DL (ref 0–150)
TSH SERPL DL<=0.05 MIU/L-ACNC: 0.87 UIU/ML (ref 0.27–4.2)
VIT B12 BLD-MCNC: 240 PG/ML (ref 211–946)
VLDLC SERPL-MCNC: 30 MG/DL (ref 5–40)

## 2022-06-15 RX ORDER — OMEPRAZOLE 40 MG/1
40 CAPSULE, DELAYED RELEASE ORAL DAILY
Qty: 30 CAPSULE | Refills: 5 | Status: SHIPPED | OUTPATIENT
Start: 2022-06-15 | End: 2022-07-08 | Stop reason: SDUPTHER

## 2022-07-08 DIAGNOSIS — I10 ESSENTIAL HYPERTENSION: ICD-10-CM

## 2022-07-08 DIAGNOSIS — E78.2 MIXED HYPERLIPIDEMIA: ICD-10-CM

## 2022-07-08 RX ORDER — OMEPRAZOLE 40 MG/1
40 CAPSULE, DELAYED RELEASE ORAL DAILY
Qty: 30 CAPSULE | Refills: 0 | Status: SHIPPED | OUTPATIENT
Start: 2022-07-08 | End: 2022-08-15

## 2022-07-08 RX ORDER — LISINOPRIL 30 MG/1
30 TABLET ORAL DAILY
Qty: 90 TABLET | Refills: 0 | Status: SHIPPED | OUTPATIENT
Start: 2022-07-08 | End: 2022-10-03

## 2022-07-08 RX ORDER — ATORVASTATIN CALCIUM 20 MG/1
20 TABLET, FILM COATED ORAL DAILY
Qty: 90 TABLET | Refills: 0 | Status: SHIPPED | OUTPATIENT
Start: 2022-07-08

## 2022-07-08 NOTE — TELEPHONE ENCOUNTER
Caller: Jayme Castromiltonhermilo COTTO    Relationship: Self    Best call back number: [phone]    Requested Prescriptions:   Requested Prescriptions     Pending Prescriptions Disp Refills   • omeprazole (priLOSEC) 40 MG capsule 30 capsule 5     Sig: Take 1 capsule by mouth Daily.   • atorvastatin (LIPITOR) 20 MG tablet 90 tablet 2     Sig: Take 1 tablet by mouth Daily.   • lisinopril (PRINIVIL,ZESTRIL) 30 MG tablet 90 tablet 1     Sig: Take 1 tablet by mouth Daily.        Pharm[facility] where [facility] be sent: [id number] [address]  AT [address]  [phone] [facility] [phone] FX     Additional details provided by patient: PATIENT LOST HIS JOB AND WOULD LIKE TO GET THESE PRESCRIPTIONS REFILLED BEFORE HIS INSURANCE RUNS OUT AT THE END OF July, PATIENT WOULD LIKE TO HAVE A 90 DAY SUPPLY IF POSSIBLE?    Does the patient have less than a 3 day supply:  [x] Yes  [] No    Gustavo Boston Rep   07/08/22 09:30 EDT

## 2022-08-15 RX ORDER — OMEPRAZOLE 40 MG/1
40 CAPSULE, DELAYED RELEASE ORAL DAILY
Qty: 30 CAPSULE | Refills: 2 | Status: SHIPPED | OUTPATIENT
Start: 2022-08-15

## 2022-10-03 DIAGNOSIS — I10 ESSENTIAL HYPERTENSION: ICD-10-CM

## 2022-10-03 RX ORDER — LISINOPRIL 30 MG/1
30 TABLET ORAL DAILY
Qty: 90 TABLET | Refills: 0 | Status: SHIPPED | OUTPATIENT
Start: 2022-10-03

## 2025-04-27 ENCOUNTER — APPOINTMENT (OUTPATIENT)
Dept: GENERAL RADIOLOGY | Facility: HOSPITAL | Age: 55
End: 2025-04-27
Payer: COMMERCIAL

## 2025-04-27 ENCOUNTER — APPOINTMENT (OUTPATIENT)
Dept: CT IMAGING | Facility: HOSPITAL | Age: 55
End: 2025-04-27
Payer: COMMERCIAL

## 2025-04-27 ENCOUNTER — HOSPITAL ENCOUNTER (INPATIENT)
Facility: HOSPITAL | Age: 55
LOS: 5 days | Discharge: HOME | End: 2025-05-02
Attending: EMERGENCY MEDICINE | Admitting: INTERNAL MEDICINE
Payer: COMMERCIAL

## 2025-04-27 ENCOUNTER — APPOINTMENT (OUTPATIENT)
Dept: MRI IMAGING | Facility: HOSPITAL | Age: 55
End: 2025-04-27
Payer: COMMERCIAL

## 2025-04-27 DIAGNOSIS — E87.1 HYPONATREMIA: ICD-10-CM

## 2025-04-27 DIAGNOSIS — E11.65 TYPE 2 DIABETES MELLITUS WITH HYPERGLYCEMIA, WITHOUT LONG-TERM CURRENT USE OF INSULIN: ICD-10-CM

## 2025-04-27 DIAGNOSIS — S42.201A CLOSED FRACTURE OF PROXIMAL END OF RIGHT HUMERUS, UNSPECIFIED FRACTURE MORPHOLOGY, INITIAL ENCOUNTER: ICD-10-CM

## 2025-04-27 DIAGNOSIS — R56.9 SEIZURE: ICD-10-CM

## 2025-04-27 DIAGNOSIS — I10 ACCELERATED HYPERTENSION: ICD-10-CM

## 2025-04-27 DIAGNOSIS — E83.42 HYPOMAGNESEMIA: ICD-10-CM

## 2025-04-27 DIAGNOSIS — E11.10 DIABETIC KETOACIDOSIS WITHOUT COMA ASSOCIATED WITH TYPE 2 DIABETES MELLITUS: Primary | ICD-10-CM

## 2025-04-27 DIAGNOSIS — E83.39 HYPOPHOSPHATEMIA: ICD-10-CM

## 2025-04-27 LAB
ALBUMIN SERPL-MCNC: 4.7 G/DL (ref 3.5–5.2)
ALBUMIN/GLOB SERPL: 1.2 G/DL
ALP SERPL-CCNC: 135 U/L (ref 39–117)
ALT SERPL W P-5'-P-CCNC: 34 U/L (ref 1–41)
AMPHET+METHAMPHET UR QL: NEGATIVE
AMPHETAMINES UR QL: NEGATIVE
ANION GAP SERPL CALCULATED.3IONS-SCNC: 13 MMOL/L (ref 5–15)
ANION GAP SERPL CALCULATED.3IONS-SCNC: 17 MMOL/L (ref 5–15)
ANION GAP SERPL CALCULATED.3IONS-SCNC: 19 MMOL/L (ref 5–15)
APAP SERPL-MCNC: <5 MCG/ML (ref 0–30)
AST SERPL-CCNC: 28 U/L (ref 1–40)
ATMOSPHERIC PRESS: ABNORMAL MM[HG]
B-OH-BUTYR SERPL-SCNC: 0.67 MMOL/L (ref 0.02–0.27)
BACTERIA UR QL AUTO: ABNORMAL /HPF
BARBITURATES UR QL SCN: NEGATIVE
BASE EXCESS BLDV CALC-SCNC: -11 MMOL/L (ref -2–2)
BASOPHILS # BLD AUTO: 0.05 10*3/MM3 (ref 0–0.2)
BASOPHILS # BLD MANUAL: 0 10*3/MM3 (ref 0–0.2)
BASOPHILS NFR BLD AUTO: 0.3 % (ref 0–1.5)
BASOPHILS NFR BLD MANUAL: 0 % (ref 0–1.5)
BDY SITE: ABNORMAL
BENZODIAZ UR QL SCN: POSITIVE
BILIRUB SERPL-MCNC: 0.6 MG/DL (ref 0–1.2)
BILIRUB UR QL STRIP: NEGATIVE
BODY TEMPERATURE: 37
BUN SERPL-MCNC: 10 MG/DL (ref 6–20)
BUN SERPL-MCNC: 10 MG/DL (ref 6–20)
BUN SERPL-MCNC: 9 MG/DL (ref 6–20)
BUN/CREAT SERPL: 6.6 (ref 7–25)
BUN/CREAT SERPL: 7.7 (ref 7–25)
BUN/CREAT SERPL: 7.8 (ref 7–25)
BUPRENORPHINE SERPL-MCNC: NEGATIVE NG/ML
CALCIUM SPEC-SCNC: 7.7 MG/DL (ref 8.6–10.5)
CALCIUM SPEC-SCNC: 8.1 MG/DL (ref 8.6–10.5)
CALCIUM SPEC-SCNC: 9.1 MG/DL (ref 8.6–10.5)
CANNABINOIDS SERPL QL: POSITIVE
CHLORIDE SERPL-SCNC: 89 MMOL/L (ref 98–107)
CHLORIDE SERPL-SCNC: 94 MMOL/L (ref 98–107)
CHLORIDE SERPL-SCNC: 98 MMOL/L (ref 98–107)
CLARITY UR: CLEAR
CO2 BLDA-SCNC: 13.6 MMOL/L (ref 22–33)
CO2 SERPL-SCNC: 15 MMOL/L (ref 22–29)
CO2 SERPL-SCNC: 16 MMOL/L (ref 22–29)
CO2 SERPL-SCNC: 17 MMOL/L (ref 22–29)
COCAINE UR QL: NEGATIVE
COHGB MFR BLD: 1.2 %
COLOR UR: YELLOW
CREAT SERPL-MCNC: 1.16 MG/DL (ref 0.76–1.27)
CREAT SERPL-MCNC: 1.3 MG/DL (ref 0.76–1.27)
CREAT SERPL-MCNC: 1.51 MG/DL (ref 0.76–1.27)
D-LACTATE SERPL-SCNC: 1.6 MMOL/L (ref 0.5–2)
D-LACTATE SERPL-SCNC: 10.1 MMOL/L (ref 0.5–2)
D-LACTATE SERPL-SCNC: 2.7 MMOL/L (ref 0.5–2)
D-LACTATE SERPL-SCNC: 3 MMOL/L (ref 0.5–2)
DEPRECATED RDW RBC AUTO: 39.8 FL (ref 37–54)
DEPRECATED RDW RBC AUTO: 40.5 FL (ref 37–54)
EGFRCR SERPLBLD CKD-EPI 2021: 54.5 ML/MIN/1.73
EGFRCR SERPLBLD CKD-EPI 2021: 65.3 ML/MIN/1.73
EGFRCR SERPLBLD CKD-EPI 2021: 74.8 ML/MIN/1.73
EOSINOPHIL # BLD AUTO: 0 10*3/MM3 (ref 0–0.4)
EOSINOPHIL # BLD MANUAL: 0 10*3/MM3 (ref 0–0.4)
EOSINOPHIL NFR BLD AUTO: 0 % (ref 0.3–6.2)
EOSINOPHIL NFR BLD MANUAL: 0 % (ref 0.3–6.2)
EPAP: 0
ERYTHROCYTE [DISTWIDTH] IN BLOOD BY AUTOMATED COUNT: 11.9 % (ref 12.3–15.4)
ERYTHROCYTE [DISTWIDTH] IN BLOOD BY AUTOMATED COUNT: 12 % (ref 12.3–15.4)
ETHANOL BLD-MCNC: <10 MG/DL (ref 0–10)
FENTANYL UR-MCNC: NEGATIVE NG/ML
FLUAV RNA RESP QL NAA+PROBE: NOT DETECTED
FLUBV RNA RESP QL NAA+PROBE: NOT DETECTED
GEN 5 1HR TROPONIN T REFLEX: 60 NG/L
GLOBULIN UR ELPH-MCNC: 3.8 GM/DL
GLUCOSE BLDC GLUCOMTR-MCNC: 164 MG/DL (ref 70–130)
GLUCOSE BLDC GLUCOMTR-MCNC: 166 MG/DL (ref 70–130)
GLUCOSE BLDC GLUCOMTR-MCNC: 183 MG/DL (ref 70–130)
GLUCOSE BLDC GLUCOMTR-MCNC: 202 MG/DL (ref 70–130)
GLUCOSE BLDC GLUCOMTR-MCNC: 234 MG/DL (ref 70–130)
GLUCOSE BLDC GLUCOMTR-MCNC: 270 MG/DL (ref 70–130)
GLUCOSE BLDC GLUCOMTR-MCNC: 287 MG/DL (ref 70–130)
GLUCOSE SERPL-MCNC: 178 MG/DL (ref 65–99)
GLUCOSE SERPL-MCNC: 240 MG/DL (ref 65–99)
GLUCOSE SERPL-MCNC: 309 MG/DL (ref 65–99)
GLUCOSE UR STRIP-MCNC: ABNORMAL MG/DL
HBA1C MFR BLD: 6.6 % (ref 4.8–5.6)
HCO3 BLDV-SCNC: 12.8 MMOL/L (ref 22–28)
HCT VFR BLD AUTO: 51.5 % (ref 37.5–51)
HCT VFR BLD AUTO: 56.8 % (ref 37.5–51)
HGB BLD-MCNC: 18.4 G/DL (ref 13–17.7)
HGB BLD-MCNC: 20.4 G/DL (ref 13–17.7)
HGB BLDA-MCNC: 19 G/DL (ref 13.5–17.5)
HGB UR QL STRIP.AUTO: ABNORMAL
HOLD SPECIMEN: NORMAL
IMM GRANULOCYTES # BLD AUTO: 0.09 10*3/MM3 (ref 0–0.05)
IMM GRANULOCYTES NFR BLD AUTO: 0.5 % (ref 0–0.5)
INHALED O2 CONCENTRATION: 21 %
IPAP: 0
KETONES UR QL STRIP: ABNORMAL
LEUKOCYTE ESTERASE UR QL STRIP.AUTO: NEGATIVE
LIPASE SERPL-CCNC: 25 U/L (ref 13–60)
LYMPHOCYTES # BLD AUTO: 0.95 10*3/MM3 (ref 0.7–3.1)
LYMPHOCYTES # BLD MANUAL: 1.05 10*3/MM3 (ref 0.7–3.1)
LYMPHOCYTES NFR BLD AUTO: 5.1 % (ref 19.6–45.3)
LYMPHOCYTES NFR BLD MANUAL: 5 % (ref 5–12)
Lab: ABNORMAL
MAGNESIUM SERPL-MCNC: 1.5 MG/DL (ref 1.6–2.6)
MCH RBC QN AUTO: 32.7 PG (ref 26.6–33)
MCH RBC QN AUTO: 32.7 PG (ref 26.6–33)
MCHC RBC AUTO-ENTMCNC: 35.7 G/DL (ref 31.5–35.7)
MCHC RBC AUTO-ENTMCNC: 35.9 G/DL (ref 31.5–35.7)
MCV RBC AUTO: 91 FL (ref 79–97)
MCV RBC AUTO: 91.6 FL (ref 79–97)
METHADONE UR QL SCN: NEGATIVE
METHGB BLD QL: 0.1 %
MODALITY: ABNORMAL
MONOCYTES # BLD AUTO: 0.69 10*3/MM3 (ref 0.1–0.9)
MONOCYTES # BLD: 1.32 10*3/MM3 (ref 0.1–0.9)
MONOCYTES NFR BLD AUTO: 3.7 % (ref 5–12)
NEUTROPHILS # BLD AUTO: 23.93 10*3/MM3 (ref 1.7–7)
NEUTROPHILS NFR BLD AUTO: 16.85 10*3/MM3 (ref 1.7–7)
NEUTROPHILS NFR BLD AUTO: 90.4 % (ref 42.7–76)
NEUTROPHILS NFR BLD MANUAL: 83 % (ref 42.7–76)
NEUTS BAND NFR BLD MANUAL: 8 % (ref 0–5)
NITRITE UR QL STRIP: NEGATIVE
NOTIFIED BY: ABNORMAL
NOTIFIED WHO: ABNORMAL
NRBC BLD AUTO-RTO: 0 /100 WBC (ref 0–0.2)
OPIATES UR QL: POSITIVE
OSMOLALITY SERPL: 277 MOSM/KG (ref 275–295)
OSMOLALITY UR: 555 MOSM/KG (ref 300–1100)
OXYCODONE UR QL SCN: NEGATIVE
OXYHGB MFR BLDV: 87.4 %
PAW @ PEAK INSP FLOW SETTING VENT: 0 CMH2O
PCO2 BLDV: 25.2 MM HG (ref 41–51)
PCP UR QL SCN: NEGATIVE
PH BLDV: 7.31 PH UNITS (ref 7.31–7.41)
PH UR STRIP.AUTO: 5.5 [PH] (ref 5–8)
PHOSPHATE SERPL-MCNC: 1.8 MG/DL (ref 2.5–4.5)
PLAT MORPH BLD: NORMAL
PLAT MORPH BLD: NORMAL
PLATELET # BLD AUTO: 223 10*3/MM3 (ref 140–450)
PLATELET # BLD AUTO: 235 10*3/MM3 (ref 140–450)
PMV BLD AUTO: 10.1 FL (ref 6–12)
PMV BLD AUTO: 10.3 FL (ref 6–12)
PO2 BLDV: 59.8 MM HG (ref 27–53)
POTASSIUM SERPL-SCNC: 3.9 MMOL/L (ref 3.5–5.2)
PROCALCITONIN SERPL-MCNC: 0.05 NG/ML (ref 0–0.25)
PROT SERPL-MCNC: 8.5 G/DL (ref 6–8.5)
PROT UR QL STRIP: ABNORMAL
QT INTERVAL: 374 MS
QT INTERVAL: 384 MS
QTC INTERVAL: 474 MS
QTC INTERVAL: 529 MS
RBC # BLD AUTO: 5.62 10*6/MM3 (ref 4.14–5.8)
RBC # BLD AUTO: 6.24 10*6/MM3 (ref 4.14–5.8)
RBC # UR STRIP: ABNORMAL /HPF
RBC MORPH BLD: NORMAL
RBC MORPH BLD: NORMAL
REF LAB TEST METHOD: ABNORMAL
RSV RNA RESP QL NAA+PROBE: NOT DETECTED
SALICYLATES SERPL-MCNC: <0.3 MG/DL
SARS-COV-2 RNA RESP QL NAA+PROBE: NOT DETECTED
SODIUM SERPL-SCNC: 125 MMOL/L (ref 136–145)
SODIUM SERPL-SCNC: 126 MMOL/L (ref 136–145)
SODIUM SERPL-SCNC: 127 MMOL/L (ref 136–145)
SP GR UR STRIP: 1.02 (ref 1–1.03)
SQUAMOUS #/AREA URNS HPF: ABNORMAL /HPF
TOTAL RATE: 0 BREATHS/MINUTE
TRICYCLICS UR QL SCN: NEGATIVE
TROPONIN T % DELTA: 71
TROPONIN T NUMERIC DELTA: 25 NG/L
TROPONIN T SERPL HS-MCNC: 35 NG/L
TSH SERPL DL<=0.05 MIU/L-ACNC: 1.37 UIU/ML (ref 0.27–4.2)
UROBILINOGEN UR QL STRIP: ABNORMAL
VARIANT LYMPHS NFR BLD MANUAL: 4 % (ref 19.6–45.3)
WBC # UR STRIP: ABNORMAL /HPF
WBC MORPH BLD: NORMAL
WBC MORPH BLD: NORMAL
WBC NRBC COR # BLD AUTO: 18.63 10*3/MM3 (ref 3.4–10.8)
WBC NRBC COR # BLD AUTO: 26.3 10*3/MM3 (ref 3.4–10.8)
WHOLE BLOOD HOLD COAG: NORMAL
WHOLE BLOOD HOLD SPECIMEN: NORMAL

## 2025-04-27 PROCEDURE — 85007 BL SMEAR W/DIFF WBC COUNT: CPT | Performed by: EMERGENCY MEDICINE

## 2025-04-27 PROCEDURE — 70450 CT HEAD/BRAIN W/O DYE: CPT

## 2025-04-27 PROCEDURE — 87040 BLOOD CULTURE FOR BACTERIA: CPT

## 2025-04-27 PROCEDURE — 83605 ASSAY OF LACTIC ACID: CPT | Performed by: EMERGENCY MEDICINE

## 2025-04-27 PROCEDURE — 93010 ELECTROCARDIOGRAM REPORT: CPT | Performed by: INTERNAL MEDICINE

## 2025-04-27 PROCEDURE — 93005 ELECTROCARDIOGRAM TRACING: CPT

## 2025-04-27 PROCEDURE — 80143 DRUG ASSAY ACETAMINOPHEN: CPT

## 2025-04-27 PROCEDURE — 99291 CRITICAL CARE FIRST HOUR: CPT

## 2025-04-27 PROCEDURE — 70553 MRI BRAIN STEM W/O & W/DYE: CPT

## 2025-04-27 PROCEDURE — 84145 PROCALCITONIN (PCT): CPT | Performed by: INTERNAL MEDICINE

## 2025-04-27 PROCEDURE — 25010000002 MAGNESIUM SULFATE 2 GM/50ML SOLUTION: Performed by: EMERGENCY MEDICINE

## 2025-04-27 PROCEDURE — 83935 ASSAY OF URINE OSMOLALITY: CPT

## 2025-04-27 PROCEDURE — 82948 REAGENT STRIP/BLOOD GLUCOSE: CPT

## 2025-04-27 PROCEDURE — 25010000002 NICARDIPINE 2.5 MG/ML SOLUTION 10 ML VIAL

## 2025-04-27 PROCEDURE — 25010000002 FOLIC ACID 5 MG/ML SOLUTION 10 ML VIAL

## 2025-04-27 PROCEDURE — 25010000002 LORAZEPAM PER 2 MG

## 2025-04-27 PROCEDURE — 82010 KETONE BODYS QUAN: CPT

## 2025-04-27 PROCEDURE — 83735 ASSAY OF MAGNESIUM: CPT

## 2025-04-27 PROCEDURE — 84100 ASSAY OF PHOSPHORUS: CPT

## 2025-04-27 PROCEDURE — 25810000003 SODIUM CHLORIDE 0.9 % SOLUTION

## 2025-04-27 PROCEDURE — 25010000002 THIAMINE PER 100 MG: Performed by: PSYCHIATRY & NEUROLOGY

## 2025-04-27 PROCEDURE — 87637 SARSCOV2&INF A&B&RSV AMP PRB: CPT

## 2025-04-27 PROCEDURE — 85025 COMPLETE CBC W/AUTO DIFF WBC: CPT | Performed by: INTERNAL MEDICINE

## 2025-04-27 PROCEDURE — 83930 ASSAY OF BLOOD OSMOLALITY: CPT

## 2025-04-27 PROCEDURE — 36415 COLL VENOUS BLD VENIPUNCTURE: CPT

## 2025-04-27 PROCEDURE — 85007 BL SMEAR W/DIFF WBC COUNT: CPT | Performed by: INTERNAL MEDICINE

## 2025-04-27 PROCEDURE — 25010000002 MORPHINE PER 10 MG: Performed by: EMERGENCY MEDICINE

## 2025-04-27 PROCEDURE — 82077 ASSAY SPEC XCP UR&BREATH IA: CPT

## 2025-04-27 PROCEDURE — 25810000003 SODIUM CHLORIDE 0.9 % SOLUTION 250 ML FLEX CONT

## 2025-04-27 PROCEDURE — 83690 ASSAY OF LIPASE: CPT | Performed by: EMERGENCY MEDICINE

## 2025-04-27 PROCEDURE — 25010000002 LORAZEPAM PER 2 MG: Performed by: EMERGENCY MEDICINE

## 2025-04-27 PROCEDURE — 25010000002 SODIUM CHLORIDE 0.9 % WITH KCL 20 MEQ 20-0.9 MEQ/L-% SOLUTION

## 2025-04-27 PROCEDURE — 73030 X-RAY EXAM OF SHOULDER: CPT

## 2025-04-27 PROCEDURE — 93005 ELECTROCARDIOGRAM TRACING: CPT | Performed by: EMERGENCY MEDICINE

## 2025-04-27 PROCEDURE — 80307 DRUG TEST PRSMV CHEM ANLYZR: CPT

## 2025-04-27 PROCEDURE — 99222 1ST HOSP IP/OBS MODERATE 55: CPT | Performed by: PSYCHIATRY & NEUROLOGY

## 2025-04-27 PROCEDURE — 83036 HEMOGLOBIN GLYCOSYLATED A1C: CPT

## 2025-04-27 PROCEDURE — 71045 X-RAY EXAM CHEST 1 VIEW: CPT

## 2025-04-27 PROCEDURE — 99291 CRITICAL CARE FIRST HOUR: CPT | Performed by: INTERNAL MEDICINE

## 2025-04-27 PROCEDURE — 82805 BLOOD GASES W/O2 SATURATION: CPT

## 2025-04-27 PROCEDURE — 25010000002 DROPERIDOL PER 5 MG

## 2025-04-27 PROCEDURE — 80048 BASIC METABOLIC PNL TOTAL CA: CPT

## 2025-04-27 PROCEDURE — 80179 DRUG ASSAY SALICYLATE: CPT

## 2025-04-27 PROCEDURE — 80050 GENERAL HEALTH PANEL: CPT | Performed by: EMERGENCY MEDICINE

## 2025-04-27 PROCEDURE — 84484 ASSAY OF TROPONIN QUANT: CPT

## 2025-04-27 PROCEDURE — 25010000002 LORAZEPAM PER 2 MG: Performed by: INTERNAL MEDICINE

## 2025-04-27 PROCEDURE — 81001 URINALYSIS AUTO W/SCOPE: CPT | Performed by: EMERGENCY MEDICINE

## 2025-04-27 RX ORDER — SODIUM CHLORIDE 9 MG/ML
10 INJECTION, SOLUTION INTRAMUSCULAR; INTRAVENOUS; SUBCUTANEOUS AS NEEDED
Status: DISCONTINUED | OUTPATIENT
Start: 2025-04-27 | End: 2025-05-02 | Stop reason: HOSPADM

## 2025-04-27 RX ORDER — SODIUM CHLORIDE AND POTASSIUM CHLORIDE 150; 450 MG/100ML; MG/100ML
200 INJECTION, SOLUTION INTRAVENOUS CONTINUOUS PRN
Status: DISCONTINUED | OUTPATIENT
Start: 2025-04-27 | End: 2025-04-28

## 2025-04-27 RX ORDER — LORAZEPAM 2 MG/ML
1 INJECTION INTRAMUSCULAR EVERY 4 HOURS PRN
Status: DISCONTINUED | OUTPATIENT
Start: 2025-04-27 | End: 2025-04-27

## 2025-04-27 RX ORDER — SODIUM CHLORIDE 9 MG/ML
40 INJECTION, SOLUTION INTRAVENOUS AS NEEDED
Status: DISCONTINUED | OUTPATIENT
Start: 2025-04-27 | End: 2025-04-28

## 2025-04-27 RX ORDER — MAGNESIUM SULFATE HEPTAHYDRATE 40 MG/ML
2 INJECTION, SOLUTION INTRAVENOUS
Status: COMPLETED | OUTPATIENT
Start: 2025-04-27 | End: 2025-04-27

## 2025-04-27 RX ORDER — SODIUM CHLORIDE 0.9 % (FLUSH) 0.9 %
10 SYRINGE (ML) INJECTION EVERY 12 HOURS SCHEDULED
Status: DISCONTINUED | OUTPATIENT
Start: 2025-04-27 | End: 2025-04-28

## 2025-04-27 RX ORDER — LORAZEPAM 2 MG/ML
1 INJECTION INTRAMUSCULAR EVERY 4 HOURS PRN
Status: DISCONTINUED | OUTPATIENT
Start: 2025-04-27 | End: 2025-05-02

## 2025-04-27 RX ORDER — POLYETHYLENE GLYCOL 3350 17 G/17G
17 POWDER, FOR SOLUTION ORAL DAILY PRN
Status: DISCONTINUED | OUTPATIENT
Start: 2025-04-27 | End: 2025-05-02 | Stop reason: HOSPADM

## 2025-04-27 RX ORDER — SODIUM CHLORIDE 0.9 % (FLUSH) 0.9 %
10 SYRINGE (ML) INJECTION EVERY 12 HOURS SCHEDULED
Status: DISCONTINUED | OUTPATIENT
Start: 2025-04-27 | End: 2025-05-02 | Stop reason: HOSPADM

## 2025-04-27 RX ORDER — LORAZEPAM 2 MG/ML
INJECTION INTRAMUSCULAR
Status: COMPLETED
Start: 2025-04-27 | End: 2025-04-27

## 2025-04-27 RX ORDER — IBUPROFEN 600 MG/1
1 TABLET ORAL
Status: DISCONTINUED | OUTPATIENT
Start: 2025-04-27 | End: 2025-04-28

## 2025-04-27 RX ORDER — AMOXICILLIN 250 MG
2 CAPSULE ORAL 2 TIMES DAILY
Status: DISCONTINUED | OUTPATIENT
Start: 2025-04-27 | End: 2025-05-02 | Stop reason: HOSPADM

## 2025-04-27 RX ORDER — LORAZEPAM 2 MG/ML
2 INJECTION INTRAMUSCULAR EVERY 6 HOURS
Status: DISCONTINUED | OUTPATIENT
Start: 2025-04-27 | End: 2025-04-27

## 2025-04-27 RX ORDER — DEXTROSE MONOHYDRATE AND SODIUM CHLORIDE 5; .9 G/100ML; G/100ML
125 INJECTION, SOLUTION INTRAVENOUS CONTINUOUS PRN
Status: DISCONTINUED | OUTPATIENT
Start: 2025-04-27 | End: 2025-04-28

## 2025-04-27 RX ORDER — DEXTROSE MONOHYDRATE, SODIUM CHLORIDE, AND POTASSIUM CHLORIDE 50; 1.49; 4.5 G/1000ML; G/1000ML; G/1000ML
125 INJECTION, SOLUTION INTRAVENOUS CONTINUOUS PRN
Status: DISCONTINUED | OUTPATIENT
Start: 2025-04-27 | End: 2025-04-28

## 2025-04-27 RX ORDER — NITROGLYCERIN 0.4 MG/1
0.4 TABLET SUBLINGUAL
Status: DISCONTINUED | OUTPATIENT
Start: 2025-04-27 | End: 2025-05-02 | Stop reason: HOSPADM

## 2025-04-27 RX ORDER — MORPHINE SULFATE 4 MG/ML
4 INJECTION, SOLUTION INTRAMUSCULAR; INTRAVENOUS ONCE
Status: COMPLETED | OUTPATIENT
Start: 2025-04-27 | End: 2025-04-27

## 2025-04-27 RX ORDER — BISACODYL 5 MG/1
5 TABLET, DELAYED RELEASE ORAL DAILY PRN
Status: DISCONTINUED | OUTPATIENT
Start: 2025-04-27 | End: 2025-05-02 | Stop reason: HOSPADM

## 2025-04-27 RX ORDER — DROPERIDOL 2.5 MG/ML
2.5 INJECTION, SOLUTION INTRAMUSCULAR; INTRAVENOUS ONCE
Status: COMPLETED | OUTPATIENT
Start: 2025-04-27 | End: 2025-04-27

## 2025-04-27 RX ORDER — SODIUM CHLORIDE 0.9 % (FLUSH) 0.9 %
10 SYRINGE (ML) INJECTION AS NEEDED
Status: DISCONTINUED | OUTPATIENT
Start: 2025-04-27 | End: 2025-05-02 | Stop reason: HOSPADM

## 2025-04-27 RX ORDER — SODIUM CHLORIDE 0.9 % (FLUSH) 0.9 %
10 SYRINGE (ML) INJECTION AS NEEDED
Status: DISCONTINUED | OUTPATIENT
Start: 2025-04-27 | End: 2025-04-28

## 2025-04-27 RX ORDER — SODIUM CHLORIDE AND POTASSIUM CHLORIDE 300; 900 MG/100ML; MG/100ML
200 INJECTION, SOLUTION INTRAVENOUS CONTINUOUS PRN
Status: DISCONTINUED | OUTPATIENT
Start: 2025-04-27 | End: 2025-04-28

## 2025-04-27 RX ORDER — NICOTINE POLACRILEX 4 MG
15 LOZENGE BUCCAL
Status: DISCONTINUED | OUTPATIENT
Start: 2025-04-27 | End: 2025-04-28

## 2025-04-27 RX ORDER — DEXTROSE MONOHYDRATE AND SODIUM CHLORIDE 5; .45 G/100ML; G/100ML
125 INJECTION, SOLUTION INTRAVENOUS CONTINUOUS PRN
Status: DISCONTINUED | OUTPATIENT
Start: 2025-04-27 | End: 2025-04-28

## 2025-04-27 RX ORDER — LORAZEPAM 2 MG/ML
1 INJECTION INTRAMUSCULAR ONCE
Status: DISCONTINUED | OUTPATIENT
Start: 2025-04-27 | End: 2025-04-27

## 2025-04-27 RX ORDER — DEXTROSE MONOHYDRATE 25 G/50ML
10-50 INJECTION, SOLUTION INTRAVENOUS
Status: DISCONTINUED | OUTPATIENT
Start: 2025-04-27 | End: 2025-04-28

## 2025-04-27 RX ORDER — DEXTROSE MONOHYDRATE, SODIUM CHLORIDE, AND POTASSIUM CHLORIDE 50; 1.49; 9 G/1000ML; G/1000ML; G/1000ML
125 INJECTION, SOLUTION INTRAVENOUS CONTINUOUS PRN
Status: DISCONTINUED | OUTPATIENT
Start: 2025-04-27 | End: 2025-04-28

## 2025-04-27 RX ORDER — SODIUM CHLORIDE AND POTASSIUM CHLORIDE 150; 900 MG/100ML; MG/100ML
200 INJECTION, SOLUTION INTRAVENOUS CONTINUOUS PRN
Status: DISCONTINUED | OUTPATIENT
Start: 2025-04-27 | End: 2025-04-28

## 2025-04-27 RX ORDER — LORAZEPAM 2 MG/ML
1 INJECTION INTRAMUSCULAR EVERY 8 HOURS SCHEDULED
Status: DISCONTINUED | OUTPATIENT
Start: 2025-04-27 | End: 2025-04-28

## 2025-04-27 RX ORDER — LORAZEPAM 2 MG/ML
2 INJECTION INTRAMUSCULAR ONCE
Status: COMPLETED | OUTPATIENT
Start: 2025-04-27 | End: 2025-04-27

## 2025-04-27 RX ORDER — SODIUM CHLORIDE 9 MG/ML
40 INJECTION, SOLUTION INTRAVENOUS AS NEEDED
Status: DISCONTINUED | OUTPATIENT
Start: 2025-04-27 | End: 2025-05-02 | Stop reason: HOSPADM

## 2025-04-27 RX ORDER — SODIUM CHLORIDE 9 MG/ML
200 INJECTION, SOLUTION INTRAVENOUS CONTINUOUS PRN
Status: DISCONTINUED | OUTPATIENT
Start: 2025-04-27 | End: 2025-04-28

## 2025-04-27 RX ORDER — SODIUM CHLORIDE 450 MG/100ML
200 INJECTION, SOLUTION INTRAVENOUS CONTINUOUS PRN
Status: DISCONTINUED | OUTPATIENT
Start: 2025-04-27 | End: 2025-04-28

## 2025-04-27 RX ORDER — BISACODYL 10 MG
10 SUPPOSITORY, RECTAL RECTAL DAILY PRN
Status: DISCONTINUED | OUTPATIENT
Start: 2025-04-27 | End: 2025-05-02 | Stop reason: HOSPADM

## 2025-04-27 RX ORDER — DEXTROSE MONOHYDRATE, SODIUM CHLORIDE, AND POTASSIUM CHLORIDE 50; 2.98; 4.5 G/1000ML; G/1000ML; G/1000ML
125 INJECTION, SOLUTION INTRAVENOUS CONTINUOUS PRN
Status: DISCONTINUED | OUTPATIENT
Start: 2025-04-27 | End: 2025-04-28

## 2025-04-27 RX ADMIN — MORPHINE SULFATE 4 MG: 4 INJECTION, SOLUTION INTRAMUSCULAR; INTRAVENOUS at 14:40

## 2025-04-27 RX ADMIN — SODIUM CHLORIDE 5 MG/HR: 9 INJECTION, SOLUTION INTRAVENOUS at 20:57

## 2025-04-27 RX ADMIN — LORAZEPAM 1 MG: 2 INJECTION INTRAMUSCULAR; INTRAVENOUS at 23:14

## 2025-04-27 RX ADMIN — DROPERIDOL 2.5 MG: 2.5 INJECTION, SOLUTION INTRAMUSCULAR; INTRAVENOUS at 12:48

## 2025-04-27 RX ADMIN — FOLIC ACID 1 MG: 5 INJECTION, SOLUTION INTRAMUSCULAR; INTRAVENOUS; SUBCUTANEOUS at 18:35

## 2025-04-27 RX ADMIN — THIAMINE HYDROCHLORIDE 500 MG: 100 INJECTION, SOLUTION INTRAMUSCULAR; INTRAVENOUS at 18:26

## 2025-04-27 RX ADMIN — LORAZEPAM 1 MG: 2 INJECTION INTRAMUSCULAR; INTRAVENOUS at 13:32

## 2025-04-27 RX ADMIN — MAGNESIUM SULFATE HEPTAHYDRATE 2 G: 40 INJECTION, SOLUTION INTRAVENOUS at 15:58

## 2025-04-27 RX ADMIN — SODIUM CHLORIDE 2000 ML: 9 INJECTION, SOLUTION INTRAVENOUS at 13:29

## 2025-04-27 RX ADMIN — LORAZEPAM 2 MG: 2 INJECTION INTRAMUSCULAR; INTRAVENOUS at 13:45

## 2025-04-27 RX ADMIN — SODIUM CHLORIDE AND POTASSIUM CHLORIDE 200 ML/HR: .9; .15 SOLUTION INTRAVENOUS at 17:44

## 2025-04-27 RX ADMIN — SODIUM CHLORIDE 5 MG/HR: 9 INJECTION, SOLUTION INTRAVENOUS at 13:50

## 2025-04-27 RX ADMIN — MAGNESIUM SULFATE HEPTAHYDRATE 2 G: 40 INJECTION, SOLUTION INTRAVENOUS at 20:44

## 2025-04-27 RX ADMIN — INSULIN HUMAN 2.3 UNITS/HR: 1 INJECTION, SOLUTION INTRAVENOUS at 15:52

## 2025-04-27 RX ADMIN — LORAZEPAM 1 MG: 2 INJECTION INTRAMUSCULAR; INTRAVENOUS at 13:22

## 2025-04-27 RX ADMIN — MAGNESIUM SULFATE HEPTAHYDRATE 2 G: 40 INJECTION, SOLUTION INTRAVENOUS at 17:40

## 2025-04-27 RX ADMIN — MUPIROCIN 1 APPLICATION: 20 OINTMENT TOPICAL at 20:45

## 2025-04-27 RX ADMIN — DEXTROSE MONOHYDRATE, SODIUM CHLORIDE, AND POTASSIUM CHLORIDE 125 ML/HR: 50; 9; 1.49 INJECTION, SOLUTION INTRAVENOUS at 21:59

## 2025-04-27 NOTE — Clinical Note
Level of Care: Critical Care [6]   Admitting Physician: NEHEMIAS CORDOVA [[id number]]   Attending Physician: NEHEMIAS CORDOVA [[id number]]   Patient Class: Inpatient [101]

## 2025-04-27 NOTE — ED PROVIDER NOTES
Subjective   History of Present Illness  Patient is a 55-year-old male who presents accompanied by his significant other, complaining of nausea, rapid breathing, malaise, chills, elevated blood pressures.  In addition, his fiancée reports that he has had reduced appetite for the last 1 month, without weight loss.  He reports he had a job change and lost insurance and stopped getting refills of his antihypertensive and antilipid medication several years ago.  At that time he was borderline diabetic, with concern for renal insufficiency.  He reported the nausea began to be more profound this early morning.    Review of Systems   Constitutional:  Positive for chills.   HENT: Negative.     Respiratory:  Positive for shortness of breath.    Cardiovascular: Negative.    Gastrointestinal:  Positive for nausea.   Genitourinary: Negative.    Musculoskeletal: Negative.    Skin: Negative.    Neurological: Negative.        Past Medical History:   Diagnosis Date    Hyperlipidemia     Hypertension     Kidney disease        No Known Allergies    Past Surgical History:   Procedure Laterality Date    KNEE ACL RECONSTRUCTION         Family History   Problem Relation Age of Onset    Diabetes Mother     Hypertension Mother     Heart attack Father     Heart attack Paternal Grandfather        Social History     Socioeconomic History    Marital status: Single   Tobacco Use    Smoking status: Every Day     Current packs/day: 0.50     Average packs/day: 0.5 packs/day for 30.0 years (15.0 ttl pk-yrs)     Types: Cigarettes    Smokeless tobacco: Never   Substance and Sexual Activity    Alcohol use: Yes           Objective   Physical Exam  Constitutional:       General: He is not in acute distress.     Appearance: Normal appearance. He is ill-appearing. He is not toxic-appearing.   HENT:      Head: Normocephalic and atraumatic.      Right Ear: External ear normal.      Left Ear: External ear normal.      Nose: Nose normal.   Eyes:       Extraocular Movements: Extraocular movements intact.      Conjunctiva/sclera: Conjunctivae normal.      Pupils: Pupils are equal, round, and reactive to light.   Cardiovascular:      Rate and Rhythm: Normal rate.      Pulses: Normal pulses.   Pulmonary:      Effort: Pulmonary effort is normal. Respiratory distress present.      Breath sounds: Normal breath sounds.   Abdominal:      General: Abdomen is flat. Bowel sounds are normal.      Palpations: Abdomen is soft.      Tenderness: There is no abdominal tenderness.   Musculoskeletal:         General: Normal range of motion.      Cervical back: Normal range of motion.   Skin:     General: Skin is warm and dry.      Capillary Refill: Capillary refill takes less than 2 seconds.   Neurological:      General: No focal deficit present.      Mental Status: He is alert and oriented to person, place, and time.      Motor: Weakness present.      Comments: Neuro exam, following the apparent seizure episode, postictal period of approximately 30 minutes, when patient required physical restraint, constant verbal redirection.   Psychiatric:         Attention and Perception: Attention and perception normal.         Mood and Affect: Mood and affect normal.         Speech: Speech normal.         Behavior: Behavior normal. Behavior is cooperative.         Thought Content: Thought content normal.         Cognition and Memory: Cognition and memory normal.         Critical Care    Performed by: Ramon Guidry APRN  Authorized by: Alfred Back MD    Critical care provider statement:     Critical care time (minutes):  75    Critical care time was exclusive of:  Separately billable procedures and treating other patients    Critical care was necessary to treat or prevent imminent or life-threatening deterioration of the following conditions:  CNS failure or compromise, dehydration, metabolic crisis and circulatory failure    Critical care was time spent personally by me on the following  activities:  Blood draw for specimens, development of treatment plan with patient or surrogate, discussions with consultants, discussions with primary provider, evaluation of patient's response to treatment, examination of patient, obtaining history from patient or surrogate, ordering and performing treatments and interventions, ordering and review of laboratory studies, ordering and review of radiographic studies, pulse oximetry, re-evaluation of patient's condition and review of old charts    I assumed direction of critical care for this patient from another provider in my specialty: no      Care discussed with: admitting provider               ED Course  ED Course as of 04/27/25 1605   Sun Apr 27, 2025   1206 Reviewed patient's initial vital signs, he has marked elevated blood pressure readings, will recheck, and no recent dispense of antihypertensive medications. [JH]   1211 Initial evaluation of the patient ED bed 28 [JH]   1306 CBC resulted, with markedly elevated leukocytosis, hemoconcentration. [JH]   1313 Serum chemistry resulted, with hyperglycemia as expected, mild hyponatremia, lactic acidosis. [JH]   1327 Approximately 1321, unprovoked, without aura, patiently suddenly stopped conversing with the RRT at bedside, began having tonic extremity activity, was unresponsive for approximately 2 minutes, tightly clenched, benzodiazepine has been administered, with CT imaging of the head ordered [JH]   1344 At approximately 1:52p, patient is now becoming more drowsy from the doses of benzodiazepine.  He is responding purposefully as he did after approximately 1 minute with the decerebrate posturing and seizure.  His fiancée now provides additional history that the patient takes p.o. Ativan daily as well as uses recreational marijuana, and has done so for years. [JH]   1401 Contacted the [facility] for neurology on-call, received Dr. Schneider's phone number for direct call.  They are attempting to transport patient  for head CT, which we will obtain results of to communicate. [JH]   1502 CT imaging officially resulted without acute intercranial abnormality.  I called and spoke to the neurologist on-call, who recommended continued observation, maintenance benzodiazepine dosing according to the patient's reported history of daily use, and EEG, but no Keppra dosing at this time. []   1521 Spoke to Dr. Shaikh, intensivist on-call, who will admit the patient to the ICU, ordered insulin drip initiated. []   1527 Indicated the blood pressure changes on nicardipine drip to the intensivist, and confirmed that we will decrease the infusion rate to 2.5 mg/h. []   1605 Intensivist at bedside for exam of the patient who is being admitted to the ICU. []      ED Course User Index  [] Ramon Guidry, REINIER                                                       Medical Decision Making  Given the patient's complaints, his labs and primary care as well as medication management for chronic disease, my exam today, differential includes electrolyte derangement including diabetic ketoacidosis, kidney disease, accelerated versus malignant hypertension, cannot exclude acute coronary syndrome, myocardial ischemia, pneumonia.  Patient will have twelve-lead ECG, plain film imaging the chest, serum screening labs, viral swab, urinalysis collected.  We will communicate workup results, administer antiemetic medication, reevaluate for improvement.  Will consider his disposition including referral to establish or reestablish primary care, and antihypertensive medication refill.  Is agreeable with this plan.  Following the patient's neurologic event, we ordered CT imaging of the head which has been performed, additional lab and urine work, IV nicardipine infusion.  Will contact the hospitalist or intensivist for admission, as well as the neurologist on-call.    Problems Addressed:  Accelerated hypertension: complicated acute illness or injury  Diabetic  ketoacidosis without coma associated with type 2 diabetes mellitus: complicated acute illness or injury  Hypomagnesemia: complicated acute illness or injury  Hyponatremia: complicated acute illness or injury  Hypophosphatemia: complicated acute illness or injury  Seizure: complicated acute illness or injury  Type 2 diabetes mellitus with hyperglycemia, without long-term current use of insulin: complicated acute illness or injury    Amount and/or Complexity of Data Reviewed  Labs: ordered.  Radiology: ordered.  ECG/medicine tests: ordered.    Risk  OTC drugs.  Prescription drug management.  Decision regarding hospitalization.        Final diagnoses:   Diabetic ketoacidosis without coma associated with type 2 diabetes mellitus   Seizure   Accelerated hypertension   Hyponatremia   Type 2 diabetes mellitus with hyperglycemia, without long-term current use of insulin   Hypophosphatemia   Hypomagnesemia       ED Disposition  ED Disposition       ED Disposition   Decision to Admit    Condition   --    Comment   Level of Care: Critical Care [6]   Diagnosis: Seizure [[id number]]   Certification: I Certify That Inpatient Hospital Services Are Medically Necessary For Greater Than 2 Midnights                 No follow-up provider specified.       Medication List      No changes were made to your prescriptions during this visit.            Ramon Guidry, APRN  04/27/25 1812

## 2025-04-27 NOTE — H&P
H&P NOTE    Patient Identification:  Juan C Castro  55 y.o.  male  1970  [id number]                CC: Nausea rapid breathing chills elevated blood pressure    History of Present Illness:  55-year-old gentleman presented to the emergency room with nausea rapid breathing chills and elevated blood pressure.  He also reports reduced appetite for the last 1 month.  Apparently he lost his job and stopped getting refills on his blood pressure and other medications.  He reports that he has been borderline diabetic.  In the emergency room he was noted to have significantly elevated blood pressure with witnessed seizure.  The seizure was described to me as tonic seizure unresponsive for approximately 2 minutes with teeth tightly clenched.  He received benzodiazepine in the emergency room.  ER physician/ARNP spoke with neurologist Dr. Gonzalez who recommended no Keppra but use scheduled Ativan.  They also recommended stat EEG.  Patient was also noted to be in acute DKA and initiated on insulin drip per Glucomander protocol in the emergency room.  At the time of my evaluation patient was little sleepy but wakes up follows simple commands.  Denies any headache double vision or blurred vision.  Is currently requiring 2 L oxygen nasal cannula.  Some history of marijuana and other drug abuse as well.    Review of Systems  As above rest is negative  Past Medical History:  Past Medical History:   Diagnosis Date    Hyperlipidemia     Hypertension     Kidney disease        Past Surgical History:  Past Surgical History:   Procedure Laterality Date    KNEE ACL RECONSTRUCTION          Home Meds:  (Not in a hospital admission)      Allergies:  No Known Allergies    Social History:   Social History     Socioeconomic History    Marital status: Single   Tobacco Use    Smoking status: Every Day     Current packs/day: 0.50     Average packs/day: 0.5 packs/day for 30.0 years (15.0 ttl pk-yrs)     Types: Cigarettes    Smokeless  "tobacco: Never   Substance and Sexual Activity    Alcohol use: Yes       Family History:  Family History   Problem Relation Age of Onset    Diabetes Mother     Hypertension Mother     Heart attack Father     Heart attack Paternal Grandfather        Physical Exam:  BP (!) 183/108   Pulse 101   Temp 97.7 °F (36.5 °C) (Oral)   Resp 22   Ht 182.9 cm (72\")   Wt 102 kg (225 lb)   SpO2 94%   BMI 30.52 kg/m²  Body mass index is 30.52 kg/m². 94% 102 kg (225 lb)  Physical Exam  Patient is examined using the personal protective equipment as per guidelines from infection control for this particular patient as enacted.  Hand hygiene was performed before and after patient interaction.  Gentleman in no distress at this time  Eyes normal conjunctivae and pupils reactive to light  ENT normocephalic atraumatic  Neck midline trachea no thyromegaly  Chest no labored breathing clear  CVS regular rate and rhythm no lower extremity edema  Abdomen soft nontender no hepatosplenomegaly  CNS intact   Skin no rashes no nodules  Psych oriented to time place and person normal memory  Musculoskeletal no cyanosis no clubbing normal range of motion    LABS:  Lab Results   Component Value Date    CALCIUM 9.1 04/27/2025    PHOS 1.8 (C) 04/27/2025     Results from last 7 days   Lab Units 04/27/25  1230 04/27/25  1229   MAGNESIUM mg/dL  --  1.5*   SODIUM mmol/L  --  125*   POTASSIUM mmol/L  --  3.9   CHLORIDE mmol/L  --  89*   CO2 mmol/L  --  17.0*   BUN mg/dL  --  9   CREATININE mg/dL  --  1.16   GLUCOSE mg/dL  --  240*   CALCIUM mg/dL  --  9.1   WBC 10*3/mm3 18.63*  --    HEMOGLOBIN g/dL 20.4*  --    PLATELETS 10*3/mm3 235  --    ALT (SGPT) U/L  --  34   AST (SGOT) U/L  --  28     Lab Results   Component Value Date    TROPONINT 60 (C) 04/27/2025     Results from last 7 days   Lab Units 04/27/25  1443 04/27/25  1229   HSTROP T ng/L 60* 35*         Results from last 7 days   Lab Units 04/27/25  1229   LACTATE mmol/L 3.0*     Results from last 7 " days   Lab Units 04/27/25  1442   MODALITY  Room Air     Results from last 7 days   Lab Units 04/27/25  1231   INFLUENZA B PCR  Not Detected   RSV, PCR  Not Detected             Lab Results   Component Value Date    TSH 1.370 04/27/2025     Estimated Creatinine Clearance: 90 mL/min (by C-G formula based on SCr of 1.16 mg/dL).         Imaging: I personally visualized the images of scans/x-rays performed within last 3 days.      Assessment:  Hypertensive emergency  Hypertensive encephalopathy  Witnessed seizure  DKA  Hyponatremia  Leukocytosis  Hypophosphatemia  Hypomagnesemia  Lactic acidosis  Medical noncompliance  Marijuana/drug use    Recommendations:  *We have a gentleman with medical noncompliance out of his blood pressure medication with severely elevated blood pressure with witnessed seizure on chronic Ativan  Cardene drip initiated in the emergency room we will continue.  Will attempt to lower blood pressure slowly 20% from baseline acutely and monitor closely  Neurology has been consulted.  Initial CT head negative for acute intracranial pathology  EEG ordered per neurology.  Per neurology no antiseizure medications were recommended at this time.  I spoke with neurologist he wants to keep him on Ativan 1 mg every 8 hours until evaluated by them  Further neurowork-up per neurology  Urine drug screen has been ordered  Nephrology consult for fluid electrolyte management  Insulin drip per DKA protocol has been initiated in the emergency room  Trend lactate currently elevated likely related to seizure possibly.  No focal symptoms of infection as such.  Added stat procalcitonin  Blood cultures will be done  ICU core measures    Critical care time 45 minutes        Tre Shaikh MD  4/27/2025  15:48 EDT      Much of this encounter note is an electronic transcription/translation of spoken language to printed text using Dragon Software.

## 2025-04-27 NOTE — CONSULTS
Neurology Note    Patient:  Juan C Castro    YOB: 1970    REFERRING PHYSICIAN:  Dr. Shaikh    CHIEF COMPLAINT:    Seizure    HISTORY OF PRESENT ILLNESS:   The patient is a 55 y.o. male with h/o HTN, DM, presented to ED today c/o nausea, SOA, chills, elevated BP. He has not been taking his meds for some time. He drinks alcohol daily and takes Ativan prn that he buys on the street. In ED initial /145, HR 93, T97.7, O2 99%, CT head negative. He had a witnessed tonic seizure in ED a/w teeth being clenched and unresponsiveness lasting 2 minutes, received Ativan 2 mg IV. Started on Cardene drip. Noted to be ketotic, , CO2 17, anion gap 19, started on insulin drip, admitted to the ICU. Currently drowsy but o/w close to baseline. He denies prior seizures. Last drink last night per his wife, drinks beer. He did not sleep last night.    Past Medical History:  Past Medical History:   Diagnosis Date    Hyperlipidemia     Hypertension     Kidney disease        Past Surgical History:  Past Surgical History:   Procedure Laterality Date    KNEE ACL RECONSTRUCTION         Social History:   Social History     Socioeconomic History    Marital status: Single   Tobacco Use    Smoking status: Every Day     Current packs/day: 0.50     Average packs/day: 0.5 packs/day for 30.0 years (15.0 ttl pk-yrs)     Types: Cigarettes    Smokeless tobacco: Never   Substance and Sexual Activity    Alcohol use: Yes        Family History:   Family History   Problem Relation Age of Onset    Diabetes Mother     Hypertension Mother     Heart attack Father     Heart attack Paternal Grandfather        Medications Prior to Admission:    Prior to Admission medications    Medication Sig Start Date End Date Taking? Authorizing Provider   aspirin 81 MG EC tablet Take 1 tablet by mouth Daily. 6/23/20   Elin Tolentino APRN   atorvastatin (LIPITOR) 20 MG tablet Take 1 tablet by mouth Daily. 7/8/22   Micaela Peter MD    lisinopril (PRINIVIL,ZESTRIL) 30 MG tablet TAKE 1 TABLET BY MOUTH DAILY 10/3/22   Micaela Peter MD   LORazepam (Ativan) 0.5 MG tablet Take 1 tablet by mouth Every 8 (Eight) Hours As Needed for Anxiety. 1/21/22   Micaela Peter MD   omeprazole (priLOSEC) 40 MG capsule TAKE 1 CAPSULE BY MOUTH DAILY 8/15/22   Micaela Peter MD   ondansetron (ZOFRAN) 4 MG tablet Take 1 tablet by mouth Every 8 (Eight) Hours As Needed for Nausea or Vomiting.    Provider, MD Migue   sildenafil (Viagra) 25 MG tablet Take 1 tablet by mouth Daily As Needed for Erectile Dysfunction. 2/21/22   Micaela Peter MD       Allergies:  Patient has no known allergies.      Review of system  Review of Systems   Constitutional:  Positive for chills and fatigue.   Neurological:  Negative for headaches.   All other systems reviewed and are negative.      Vitals:    04/27/25 1619   BP:    Pulse:    Resp: 28   Temp:    SpO2:        Physical exam  Physical Exam  Eyes:      Extraocular Movements: Extraocular movements intact.      Pupils: Pupils are equal, round, and reactive to light.   Cardiovascular:      Rate and Rhythm: Normal rate.   Pulmonary:      Effort: Pulmonary effort is normal.   Musculoskeletal:      Cervical back: Neck supple.   Neurological:      General: No focal deficit present.      Mental Status: He is alert and oriented to person, place, and time.      Deep Tendon Reflexes: Babinski sign absent on the right side. Babinski sign absent on the left side.      Comments: Speech clear, VFF, no facial droop, no limb drift.           Lab Results   Component Value Date    WBC 18.63 (H) 04/27/2025    HGB 20.4 (H) 04/27/2025    HCT 56.8 (H) 04/27/2025    MCV 91.0 04/27/2025     04/27/2025     Lab Results   Component Value Date    GLUCOSE 240 (H) 04/27/2025    BUN 9 04/27/2025    CREATININE 1.16 04/27/2025    EGFRIFNONA 61 07/02/2021    BCR 7.8 04/27/2025    CO2 17.0 (L) 04/27/2025    CALCIUM 9.1 04/27/2025     ALBUMIN 4.7 04/27/2025    AST 28 04/27/2025    ALT 34 04/27/2025         Component  Ref Range & Units (hover) 12:29   Beta-Hydroxybutyrate Quant 0.668 High      ntains critical data STAT Lactic Acid, Reflex  Order: [id number] - Reflex for Order [id number]   Status: Final result       Next appt: None    Test Result Released: No (scheduled for 4/27/2025  5:21 PM)    Specimen Information: Blood   0 Result Notes       Component  Ref Range & Units (hover) 15:49 12:29   Lactate 10.1 High Critical  3.0 High Critical  CM          Radiological Studies:   CT Head Without Contrast  Result Date: 4/27/2025  CT HEAD WO CONTRAST Date of Exam: 4/27/2025 2:17 PM EDT Indication: seizure. Comparison: None available. Technique: Axial CT images were obtained of the head without contrast administration.  Automated exposure control and iterative construction methods were used. Findings: There is no evidence of acute intracranial hemorrhage, mass, midline shift, loss of gray-white matter differentiation, or extra-axial fluid collection.  There is normal ventricular volume. The basal cisterns are patent. No evidence of fracture.  The paranasal sinuses and mastoid air cells are predominantly well aerated. The orbits and included soft tissues show no acute abnormality.      Impression: No acute intracranial abnormality. Electronically Signed: Conrad Nicole MD  4/27/2025 2:56 PM EDT  Workstation ID: [id number]    XR Chest 1 View  Result Date: 4/27/2025  XR CHEST 1 VW Date of Exam: 4/27/2025 1:15 PM EDT Indication: Tachypnea Comparison: None available. Findings: The cardiomediastinal silhouette is within normal limits. Pulmonary vascularity appears normal. There is no focal airspace consolidation, pleural effusion, or pneumothorax. There are mild degenerative changes of the thoracic spine.     Impression: No acute cardiopulmonary abnormality. Electronically Signed: Sean Graf MD  4/27/2025 1:42 PM EDT  Workstation ID: [id number]        During  this visit the following were done:  Labs Reviewed [x]    Labs Ordered []    Radiology Reports Reviewed [x]    Radiology Ordered []    EKG, echo, and/or stress test reviewed []    EEG results reviewed  []    EEG reviewed and interpreted per myself   []    Discussed case with neurointerventionalist or neuroradiologist []    Referring Provider Records Reviewed []    ER Records Reviewed []    Hospital Records Reviewed []    History Obtained From Family []    Radiological images view and Interpreted per myself [x]    Case Discussed with referring provider [x]     Decision to obtain and request outside records  []        Assessment and Plan     New-onset generalized tonic seizure in the setting of severe HTN, suspected DKA, sleep deprivation, daily alcohol and street Ativan use. Considerations include early withdrawal, PRES, subacute stroke. Currently back to baseline.   - ICU observation.   - Ativan 1 mg q8.   - Keppra loading dose if he was to have another seizure.   - Thiamine.   - CIWA.   - UDS.   - MRI nrain w/wo.   - EEG.    Thanks.              Electronically signed by Sanchez Schneider MD on 4/27/2025 at 16:23 EDT

## 2025-04-28 ENCOUNTER — APPOINTMENT (OUTPATIENT)
Dept: CT IMAGING | Facility: HOSPITAL | Age: 55
End: 2025-04-28
Payer: COMMERCIAL

## 2025-04-28 ENCOUNTER — APPOINTMENT (OUTPATIENT)
Dept: NEUROLOGY | Facility: HOSPITAL | Age: 55
End: 2025-04-28
Payer: COMMERCIAL

## 2025-04-28 ENCOUNTER — APPOINTMENT (OUTPATIENT)
Dept: ULTRASOUND IMAGING | Facility: HOSPITAL | Age: 55
End: 2025-04-28
Payer: COMMERCIAL

## 2025-04-28 PROBLEM — N18.31 STAGE 3A CHRONIC KIDNEY DISEASE: Status: ACTIVE | Noted: 2025-04-28

## 2025-04-28 PROBLEM — E11.9 TYPE 2 DIABETES MELLITUS: Status: ACTIVE | Noted: 2025-04-28

## 2025-04-28 PROBLEM — F10.939 ALCOHOL WITHDRAWAL: Status: ACTIVE | Noted: 2025-04-28

## 2025-04-28 PROBLEM — S42.201A CLOSED FRACTURE OF PROXIMAL END OF RIGHT HUMERUS: Status: ACTIVE | Noted: 2025-04-28

## 2025-04-28 LAB
ANION GAP SERPL CALCULATED.3IONS-SCNC: 12 MMOL/L (ref 5–15)
ANION GAP SERPL CALCULATED.3IONS-SCNC: 13 MMOL/L (ref 5–15)
ANION GAP SERPL CALCULATED.3IONS-SCNC: 13 MMOL/L (ref 5–15)
BASOPHILS # BLD AUTO: 0.04 10*3/MM3 (ref 0–0.2)
BASOPHILS NFR BLD AUTO: 0.2 % (ref 0–1.5)
BUN SERPL-MCNC: 11 MG/DL (ref 6–20)
BUN/CREAT SERPL: 7.3 (ref 7–25)
BUN/CREAT SERPL: 7.5 (ref 7–25)
BUN/CREAT SERPL: 7.7 (ref 7–25)
CALCIUM SPEC-SCNC: 7.7 MG/DL (ref 8.6–10.5)
CALCIUM SPEC-SCNC: 7.9 MG/DL (ref 8.6–10.5)
CALCIUM SPEC-SCNC: 8 MG/DL (ref 8.6–10.5)
CHLORIDE SERPL-SCNC: 101 MMOL/L (ref 98–107)
CHLORIDE SERPL-SCNC: 101 MMOL/L (ref 98–107)
CHLORIDE SERPL-SCNC: 103 MMOL/L (ref 98–107)
CK SERPL-CCNC: 4801 U/L (ref 20–200)
CO2 SERPL-SCNC: 11 MMOL/L (ref 22–29)
CO2 SERPL-SCNC: 15 MMOL/L (ref 22–29)
CO2 SERPL-SCNC: 17 MMOL/L (ref 22–29)
CREAT SERPL-MCNC: 1.43 MG/DL (ref 0.76–1.27)
CREAT SERPL-MCNC: 1.47 MG/DL (ref 0.76–1.27)
CREAT SERPL-MCNC: 1.5 MG/DL (ref 0.76–1.27)
DEPRECATED RDW RBC AUTO: 47.1 FL (ref 37–54)
EGFRCR SERPLBLD CKD-EPI 2021: 55 ML/MIN/1.73
EGFRCR SERPLBLD CKD-EPI 2021: 56.3 ML/MIN/1.73
EGFRCR SERPLBLD CKD-EPI 2021: 58.2 ML/MIN/1.73
EOSINOPHIL # BLD AUTO: 0.01 10*3/MM3 (ref 0–0.4)
EOSINOPHIL NFR BLD AUTO: 0 % (ref 0.3–6.2)
EOSINOPHIL SPEC QL MICRO: 0 % EOS/100 CELLS (ref 0–0)
ERYTHROCYTE [DISTWIDTH] IN BLOOD BY AUTOMATED COUNT: 12.7 % (ref 12.3–15.4)
FERRITIN SERPL-MCNC: 515.3 NG/ML (ref 30–400)
GLUCOSE BLDC GLUCOMTR-MCNC: 124 MG/DL (ref 70–130)
GLUCOSE BLDC GLUCOMTR-MCNC: 128 MG/DL (ref 70–130)
GLUCOSE BLDC GLUCOMTR-MCNC: 138 MG/DL (ref 70–130)
GLUCOSE BLDC GLUCOMTR-MCNC: 149 MG/DL (ref 70–130)
GLUCOSE BLDC GLUCOMTR-MCNC: 152 MG/DL (ref 70–130)
GLUCOSE BLDC GLUCOMTR-MCNC: 158 MG/DL (ref 70–130)
GLUCOSE BLDC GLUCOMTR-MCNC: 173 MG/DL (ref 70–130)
GLUCOSE BLDC GLUCOMTR-MCNC: 179 MG/DL (ref 70–130)
GLUCOSE BLDC GLUCOMTR-MCNC: 181 MG/DL (ref 70–130)
GLUCOSE BLDC GLUCOMTR-MCNC: 184 MG/DL (ref 70–130)
GLUCOSE BLDC GLUCOMTR-MCNC: 92 MG/DL (ref 70–130)
GLUCOSE BLDC GLUCOMTR-MCNC: 92 MG/DL (ref 70–130)
GLUCOSE SERPL-MCNC: 169 MG/DL (ref 65–99)
GLUCOSE SERPL-MCNC: 171 MG/DL (ref 65–99)
GLUCOSE SERPL-MCNC: 92 MG/DL (ref 65–99)
HCT VFR BLD AUTO: 55 % (ref 37.5–51)
HGB BLD-MCNC: 18 G/DL (ref 13–17.7)
IMM GRANULOCYTES # BLD AUTO: 0.17 10*3/MM3 (ref 0–0.05)
IMM GRANULOCYTES NFR BLD AUTO: 0.8 % (ref 0–0.5)
IRON 24H UR-MRATE: 46 MCG/DL (ref 59–158)
IRON SATN MFR SERPL: 15 % (ref 20–50)
LDH SERPL-CCNC: 423 U/L (ref 135–225)
LYMPHOCYTES # BLD AUTO: 1.35 10*3/MM3 (ref 0.7–3.1)
LYMPHOCYTES NFR BLD AUTO: 6.7 % (ref 19.6–45.3)
MAGNESIUM SERPL-MCNC: 3.2 MG/DL (ref 1.6–2.6)
MCH RBC QN AUTO: 32.7 PG (ref 26.6–33)
MCHC RBC AUTO-ENTMCNC: 32.7 G/DL (ref 31.5–35.7)
MCV RBC AUTO: 99.8 FL (ref 79–97)
MONOCYTES # BLD AUTO: 1.42 10*3/MM3 (ref 0.1–0.9)
MONOCYTES NFR BLD AUTO: 7 % (ref 5–12)
NEUTROPHILS NFR BLD AUTO: 17.21 10*3/MM3 (ref 1.7–7)
NEUTROPHILS NFR BLD AUTO: 85.3 % (ref 42.7–76)
NRBC BLD AUTO-RTO: 0 /100 WBC (ref 0–0.2)
OSMOLALITY SERPL: 279 MOSM/KG (ref 275–295)
OSMOLALITY UR: 324 MOSM/KG (ref 300–1100)
PLATELET # BLD AUTO: 196 10*3/MM3 (ref 140–450)
PMV BLD AUTO: 9.9 FL (ref 6–12)
POTASSIUM SERPL-SCNC: 3.5 MMOL/L (ref 3.5–5.2)
POTASSIUM SERPL-SCNC: 4.1 MMOL/L (ref 3.5–5.2)
POTASSIUM SERPL-SCNC: 4.1 MMOL/L (ref 3.5–5.2)
POTASSIUM SERPL-SCNC: 4.2 MMOL/L (ref 3.5–5.2)
PROT ?TM UR-MCNC: 12.4 MG/DL
RBC # BLD AUTO: 5.51 10*6/MM3 (ref 4.14–5.8)
SODIUM SERPL-SCNC: 125 MMOL/L (ref 136–145)
SODIUM SERPL-SCNC: 129 MMOL/L (ref 136–145)
SODIUM SERPL-SCNC: 132 MMOL/L (ref 136–145)
SODIUM UR-SCNC: 36 MMOL/L
TIBC SERPL-MCNC: 308 MCG/DL (ref 298–536)
TRANSFERRIN SERPL-MCNC: 207 MG/DL (ref 200–360)
URATE SERPL-MCNC: 5.8 MG/DL (ref 3.4–7)
WBC NRBC COR # BLD AUTO: 20.2 10*3/MM3 (ref 3.4–10.8)

## 2025-04-28 PROCEDURE — 84466 ASSAY OF TRANSFERRIN: CPT | Performed by: INTERNAL MEDICINE

## 2025-04-28 PROCEDURE — 25010000002 FOLIC ACID 5 MG/ML SOLUTION 10 ML VIAL

## 2025-04-28 PROCEDURE — 99291 CRITICAL CARE FIRST HOUR: CPT | Performed by: INTERNAL MEDICINE

## 2025-04-28 PROCEDURE — 84132 ASSAY OF SERUM POTASSIUM: CPT | Performed by: INTERNAL MEDICINE

## 2025-04-28 PROCEDURE — 82948 REAGENT STRIP/BLOOD GLUCOSE: CPT

## 2025-04-28 PROCEDURE — 84540 ASSAY OF URINE/UREA-N: CPT | Performed by: INTERNAL MEDICINE

## 2025-04-28 PROCEDURE — 80048 BASIC METABOLIC PNL TOTAL CA: CPT

## 2025-04-28 PROCEDURE — 84550 ASSAY OF BLOOD/URIC ACID: CPT | Performed by: INTERNAL MEDICINE

## 2025-04-28 PROCEDURE — 76775 US EXAM ABDO BACK WALL LIM: CPT

## 2025-04-28 PROCEDURE — 25010000002 THIAMINE PER 100 MG: Performed by: PSYCHIATRY & NEUROLOGY

## 2025-04-28 PROCEDURE — 25010000002 HYDROMORPHONE PER 4 MG

## 2025-04-28 PROCEDURE — 99232 SBSQ HOSP IP/OBS MODERATE 35: CPT | Performed by: PSYCHIATRY & NEUROLOGY

## 2025-04-28 PROCEDURE — 63710000001 INSULIN LISPRO (HUMAN) PER 5 UNITS: Performed by: INTERNAL MEDICINE

## 2025-04-28 PROCEDURE — 25010000002 NICARDIPINE 2.5 MG/ML SOLUTION 10 ML VIAL

## 2025-04-28 PROCEDURE — 25810000003 SODIUM CHLORIDE 0.9 % SOLUTION 250 ML FLEX CONT

## 2025-04-28 PROCEDURE — 84156 ASSAY OF PROTEIN URINE: CPT | Performed by: INTERNAL MEDICINE

## 2025-04-28 PROCEDURE — 87205 SMEAR GRAM STAIN: CPT | Performed by: INTERNAL MEDICINE

## 2025-04-28 PROCEDURE — 95819 EEG AWAKE AND ASLEEP: CPT

## 2025-04-28 PROCEDURE — 83735 ASSAY OF MAGNESIUM: CPT | Performed by: EMERGENCY MEDICINE

## 2025-04-28 PROCEDURE — 83540 ASSAY OF IRON: CPT | Performed by: INTERNAL MEDICINE

## 2025-04-28 PROCEDURE — 25010000002 POTASSIUM CHLORIDE 10 MEQ/100ML SOLUTION: Performed by: INTERNAL MEDICINE

## 2025-04-28 PROCEDURE — 25010000003 DEXTROSE 5 % SOLUTION 1,000 ML FLEX CONT: Performed by: INTERNAL MEDICINE

## 2025-04-28 PROCEDURE — 25010000002 LORAZEPAM PER 2 MG: Performed by: INTERNAL MEDICINE

## 2025-04-28 PROCEDURE — 25510000002 GADOBENATE DIMEGLUMINE 529 MG/ML SOLUTION: Performed by: INTERNAL MEDICINE

## 2025-04-28 PROCEDURE — 25010000002 HYDROMORPHONE PER 4 MG: Performed by: PSYCHIATRY & NEUROLOGY

## 2025-04-28 PROCEDURE — 73200 CT UPPER EXTREMITY W/O DYE: CPT

## 2025-04-28 PROCEDURE — 63710000001 INSULIN GLARGINE PER 5 UNITS: Performed by: INTERNAL MEDICINE

## 2025-04-28 PROCEDURE — 83615 LACTATE (LD) (LDH) ENZYME: CPT | Performed by: INTERNAL MEDICINE

## 2025-04-28 PROCEDURE — 83935 ASSAY OF URINE OSMOLALITY: CPT | Performed by: NURSE PRACTITIONER

## 2025-04-28 PROCEDURE — 85025 COMPLETE CBC W/AUTO DIFF WBC: CPT | Performed by: INTERNAL MEDICINE

## 2025-04-28 PROCEDURE — 25010000003 DEXTROSE 5 % SOLUTION: Performed by: INTERNAL MEDICINE

## 2025-04-28 PROCEDURE — 84300 ASSAY OF URINE SODIUM: CPT | Performed by: NURSE PRACTITIONER

## 2025-04-28 PROCEDURE — 82570 ASSAY OF URINE CREATININE: CPT | Performed by: INTERNAL MEDICINE

## 2025-04-28 PROCEDURE — 82550 ASSAY OF CK (CPK): CPT | Performed by: INTERNAL MEDICINE

## 2025-04-28 PROCEDURE — A9577 INJ MULTIHANCE: HCPCS | Performed by: INTERNAL MEDICINE

## 2025-04-28 PROCEDURE — 95819 EEG AWAKE AND ASLEEP: CPT | Performed by: PSYCHIATRY & NEUROLOGY

## 2025-04-28 PROCEDURE — 81001 URINALYSIS AUTO W/SCOPE: CPT

## 2025-04-28 PROCEDURE — 83930 ASSAY OF BLOOD OSMOLALITY: CPT | Performed by: NURSE PRACTITIONER

## 2025-04-28 PROCEDURE — 82728 ASSAY OF FERRITIN: CPT | Performed by: INTERNAL MEDICINE

## 2025-04-28 RX ORDER — IPRATROPIUM BROMIDE AND ALBUTEROL SULFATE 2.5; .5 MG/3ML; MG/3ML
3 SOLUTION RESPIRATORY (INHALATION) EVERY 4 HOURS PRN
Status: DISCONTINUED | OUTPATIENT
Start: 2025-04-28 | End: 2025-05-02 | Stop reason: HOSPADM

## 2025-04-28 RX ORDER — IBUPROFEN 600 MG/1
1 TABLET ORAL
Status: DISCONTINUED | OUTPATIENT
Start: 2025-04-28 | End: 2025-05-02 | Stop reason: HOSPADM

## 2025-04-28 RX ORDER — DIPHENHYDRAMINE HYDROCHLORIDE 25 MG/1
1 CAPSULE, LIQUID FILLED ORAL 4 TIMES DAILY PRN
Qty: 1 EACH | Refills: 0 | Status: SHIPPED | OUTPATIENT
Start: 2025-04-28

## 2025-04-28 RX ORDER — BLOOD SUGAR DIAGNOSTIC
1 STRIP MISCELLANEOUS 4 TIMES DAILY PRN
Qty: 100 EACH | Refills: 12 | Status: SHIPPED | OUTPATIENT
Start: 2025-04-28

## 2025-04-28 RX ORDER — DIAZEPAM 5 MG/1
10 TABLET ORAL EVERY 6 HOURS
Status: DISCONTINUED | OUTPATIENT
Start: 2025-04-28 | End: 2025-05-01

## 2025-04-28 RX ORDER — HYDRALAZINE HYDROCHLORIDE 25 MG/1
25 TABLET, FILM COATED ORAL EVERY 8 HOURS PRN
Status: DISCONTINUED | OUTPATIENT
Start: 2025-04-28 | End: 2025-04-28

## 2025-04-28 RX ORDER — HYDROMORPHONE HYDROCHLORIDE 1 MG/ML
0.5 INJECTION, SOLUTION INTRAMUSCULAR; INTRAVENOUS; SUBCUTANEOUS ONCE AS NEEDED
Status: COMPLETED | OUTPATIENT
Start: 2025-04-28 | End: 2025-04-28

## 2025-04-28 RX ORDER — HYDRALAZINE HYDROCHLORIDE 25 MG/1
25 TABLET, FILM COATED ORAL EVERY 8 HOURS PRN
Status: DISCONTINUED | OUTPATIENT
Start: 2025-04-28 | End: 2025-05-02 | Stop reason: HOSPADM

## 2025-04-28 RX ORDER — LABETALOL HYDROCHLORIDE 5 MG/ML
20 INJECTION, SOLUTION INTRAVENOUS EVERY 4 HOURS PRN
Status: DISCONTINUED | OUTPATIENT
Start: 2025-04-28 | End: 2025-05-02 | Stop reason: HOSPADM

## 2025-04-28 RX ORDER — HYDROCODONE BITARTRATE AND ACETAMINOPHEN 5; 325 MG/1; MG/1
1 TABLET ORAL EVERY 4 HOURS PRN
Refills: 0 | Status: DISCONTINUED | OUTPATIENT
Start: 2025-04-28 | End: 2025-04-29

## 2025-04-28 RX ORDER — LORAZEPAM 2 MG/ML
1 INJECTION INTRAMUSCULAR EVERY 8 HOURS PRN
Status: DISCONTINUED | OUTPATIENT
Start: 2025-04-28 | End: 2025-05-02 | Stop reason: HOSPADM

## 2025-04-28 RX ORDER — AVOBENZONE, HOMOSALATE, OCTISALATE, OCTOCRYLENE 30; 40; 45; 26 MG/ML; MG/ML; MG/ML; MG/ML
1 CREAM TOPICAL 4 TIMES DAILY PRN
Qty: 100 EACH | Refills: 12 | Status: SHIPPED | OUTPATIENT
Start: 2025-04-28

## 2025-04-28 RX ORDER — INSULIN LISPRO 100 [IU]/ML
2-9 INJECTION, SOLUTION INTRAVENOUS; SUBCUTANEOUS
Status: DISCONTINUED | OUTPATIENT
Start: 2025-04-28 | End: 2025-05-02 | Stop reason: HOSPADM

## 2025-04-28 RX ORDER — AMLODIPINE BESYLATE 5 MG/1
5 TABLET ORAL
Status: DISCONTINUED | OUTPATIENT
Start: 2025-04-28 | End: 2025-04-29

## 2025-04-28 RX ORDER — DEXTROSE MONOHYDRATE 25 G/50ML
25 INJECTION, SOLUTION INTRAVENOUS
Status: DISCONTINUED | OUTPATIENT
Start: 2025-04-28 | End: 2025-05-02 | Stop reason: HOSPADM

## 2025-04-28 RX ORDER — NICOTINE POLACRILEX 4 MG
15 LOZENGE BUCCAL
Status: DISCONTINUED | OUTPATIENT
Start: 2025-04-28 | End: 2025-05-02 | Stop reason: HOSPADM

## 2025-04-28 RX ORDER — LISINOPRIL 20 MG/1
20 TABLET ORAL
Status: DISCONTINUED | OUTPATIENT
Start: 2025-04-28 | End: 2025-05-02 | Stop reason: HOSPADM

## 2025-04-28 RX ORDER — POTASSIUM CHLORIDE 1500 MG/1
40 TABLET, EXTENDED RELEASE ORAL EVERY 4 HOURS
Status: DISCONTINUED | OUTPATIENT
Start: 2025-04-28 | End: 2025-04-28

## 2025-04-28 RX ORDER — POTASSIUM CHLORIDE 7.45 MG/ML
10 INJECTION INTRAVENOUS
Status: COMPLETED | OUTPATIENT
Start: 2025-04-28 | End: 2025-04-28

## 2025-04-28 RX ORDER — HYDROCODONE BITARTRATE AND ACETAMINOPHEN 5; 325 MG/1; MG/1
1 TABLET ORAL EVERY 6 HOURS PRN
Refills: 0 | Status: DISCONTINUED | OUTPATIENT
Start: 2025-04-28 | End: 2025-04-28

## 2025-04-28 RX ORDER — HYDROMORPHONE HYDROCHLORIDE 1 MG/ML
0.5 INJECTION, SOLUTION INTRAMUSCULAR; INTRAVENOUS; SUBCUTANEOUS
Refills: 0 | Status: DISCONTINUED | OUTPATIENT
Start: 2025-04-28 | End: 2025-05-02

## 2025-04-28 RX ADMIN — POTASSIUM CHLORIDE 10 MEQ: 7.46 INJECTION, SOLUTION INTRAVENOUS at 04:09

## 2025-04-28 RX ADMIN — HYDRALAZINE HYDROCHLORIDE 25 MG: 25 TABLET ORAL at 14:16

## 2025-04-28 RX ADMIN — THIAMINE HYDROCHLORIDE 500 MG: 100 INJECTION, SOLUTION INTRAMUSCULAR; INTRAVENOUS at 03:56

## 2025-04-28 RX ADMIN — SODIUM BICARBONATE 60 ML/HR: 84 INJECTION INTRAVENOUS at 21:22

## 2025-04-28 RX ADMIN — SODIUM CHLORIDE 5 MG/HR: 9 INJECTION, SOLUTION INTRAVENOUS at 05:25

## 2025-04-28 RX ADMIN — POTASSIUM CHLORIDE 10 MEQ: 7.46 INJECTION, SOLUTION INTRAVENOUS at 08:37

## 2025-04-28 RX ADMIN — Medication 10 ML: at 21:41

## 2025-04-28 RX ADMIN — Medication 10 ML: at 08:37

## 2025-04-28 RX ADMIN — INSULIN HUMAN 1 UNITS/HR: 1 INJECTION, SOLUTION INTRAVENOUS at 00:58

## 2025-04-28 RX ADMIN — POTASSIUM CHLORIDE 10 MEQ: 7.46 INJECTION, SOLUTION INTRAVENOUS at 05:26

## 2025-04-28 RX ADMIN — HYDROMORPHONE HYDROCHLORIDE 0.5 MG: 1 INJECTION, SOLUTION INTRAMUSCULAR; INTRAVENOUS; SUBCUTANEOUS at 23:44

## 2025-04-28 RX ADMIN — HYDROCODONE BITARTRATE AND ACETAMINOPHEN 1 TABLET: 5; 325 TABLET ORAL at 10:43

## 2025-04-28 RX ADMIN — LORAZEPAM 1 MG: 2 INJECTION INTRAMUSCULAR; INTRAVENOUS at 05:24

## 2025-04-28 RX ADMIN — INSULIN GLARGINE 10 UNITS: 100 INJECTION, SOLUTION SUBCUTANEOUS at 10:47

## 2025-04-28 RX ADMIN — DIAZEPAM 10 MG: 10 TABLET ORAL at 10:43

## 2025-04-28 RX ADMIN — DIAZEPAM 10 MG: 10 TABLET ORAL at 16:00

## 2025-04-28 RX ADMIN — HYDROCODONE BITARTRATE AND ACETAMINOPHEN 1 TABLET: 5; 325 TABLET ORAL at 20:39

## 2025-04-28 RX ADMIN — DEXTROSE MONOHYDRATE 150 MEQ: 50 INJECTION, SOLUTION INTRAVENOUS at 14:15

## 2025-04-28 RX ADMIN — DEXTROSE MONOHYDRATE, SODIUM CHLORIDE, AND POTASSIUM CHLORIDE 125 ML/HR: 50; 9; 1.49 INJECTION, SOLUTION INTRAVENOUS at 05:40

## 2025-04-28 RX ADMIN — LISINOPRIL 20 MG: 20 TABLET ORAL at 10:43

## 2025-04-28 RX ADMIN — HYDROMORPHONE HYDROCHLORIDE 0.5 MG: 1 INJECTION, SOLUTION INTRAMUSCULAR; INTRAVENOUS; SUBCUTANEOUS at 19:12

## 2025-04-28 RX ADMIN — GADOBENATE DIMEGLUMINE 20 ML: 529 INJECTION, SOLUTION INTRAVENOUS at 00:26

## 2025-04-28 RX ADMIN — SODIUM CHLORIDE 5 MG/HR: 9 INJECTION, SOLUTION INTRAVENOUS at 10:41

## 2025-04-28 RX ADMIN — HYDROMORPHONE HYDROCHLORIDE 0.5 MG: 1 INJECTION, SOLUTION INTRAMUSCULAR; INTRAVENOUS; SUBCUTANEOUS at 14:16

## 2025-04-28 RX ADMIN — DIAZEPAM 10 MG: 10 TABLET ORAL at 21:22

## 2025-04-28 RX ADMIN — AMLODIPINE BESYLATE 5 MG: 5 TABLET ORAL at 21:21

## 2025-04-28 RX ADMIN — FOLIC ACID 1 MG: 5 INJECTION, SOLUTION INTRAMUSCULAR; INTRAVENOUS; SUBCUTANEOUS at 08:37

## 2025-04-28 RX ADMIN — MUPIROCIN 1 APPLICATION: 20 OINTMENT TOPICAL at 20:39

## 2025-04-28 RX ADMIN — HYDROMORPHONE HYDROCHLORIDE 0.5 MG: 1 INJECTION, SOLUTION INTRAMUSCULAR; INTRAVENOUS; SUBCUTANEOUS at 21:22

## 2025-04-28 RX ADMIN — POTASSIUM CHLORIDE 10 MEQ: 7.46 INJECTION, SOLUTION INTRAVENOUS at 07:07

## 2025-04-28 RX ADMIN — HYDROMORPHONE HYDROCHLORIDE 0.5 MG: 1 INJECTION, SOLUTION INTRAMUSCULAR; INTRAVENOUS; SUBCUTANEOUS at 17:02

## 2025-04-28 RX ADMIN — MUPIROCIN 1 APPLICATION: 20 OINTMENT TOPICAL at 08:37

## 2025-04-28 RX ADMIN — INSULIN LISPRO 2 UNITS: 100 INJECTION, SOLUTION INTRAVENOUS; SUBCUTANEOUS at 17:42

## 2025-04-28 NOTE — PAYOR COMM NOTE
"Ref# [id number]     KEYSHAWN Coburn, RN  Utilization Review  Phone [phone]  Fax [phone]    [place] [address]       Juan C Castro (55 y.o. Male)       Date of Birth   1970    Social Security Number       Address   [address] [place] [id number]    Home Phone   [phone]    MRN   [id number]       [facility]   [facility]    Marital Status   Single                            Admission Date   4/27/2025    Admission Type   Emergency    Admitting Provider   Tre Shaikh MD    Attending Provider   Juan C Alvares DO    Department, Room/Bed   [facility] 2B ICU, N225/1       Discharge Date       Discharge Disposition       Discharge Destination                                 Attending Provider: Juan C Alvares DO    Allergies: No Known Allergies    Isolation: None   Infection: None   Code Status: CPR    Ht: 182.9 cm (72\")   Wt: 104 kg (229 lb 4.5 oz)    Admission Cmt: None   Principal Problem: Seizure [R56.9]                   Active Insurance as of 4/27/2025       Primary Coverage       Payor Plan Insurance Group Employer/Plan Group    ANTHEM BLUE CROSS ANTHEM BLUE CROSS BLUE SHIELD PPO [id number]       Payor Plan Address Payor Plan Phone Number Payor Plan Fax Number Effective Dates    [address] [phone]  9/1/2020 - None Entered    [place] [address]         Subscriber Name Subscriber Birth Date Member ID       JUAN C CASTRO 1970 [id number]                     Emergency Contacts        (Rel.) Home Phone Work Phone Mobile Phone    Jocelyne Castro (Mother) -- -- [phone]    Albert Dennison (Significant Other) -- -- [phone]              [place]: NPI [id number] Tax ID [id number]  Insurance Information                  ANTHEM BLUE CROSS/[employer]HE[employer] BLUE CROSS BLUE SHIELD PPO Phone: [phone]    Subscriber: Juan C Castro Subscriber#: " [id number]    Group#: [id number] Precert#: [id number]    Authorization#: -- Effective Date: --          Problem List           Codes Noted - Resolved       Hospital    Alcohol withdrawal ICD-10-CM: F10.939  ICD-9-CM: 291.81 4/28/2025 - Present    Closed fracture of proximal end of right humerus ICD-10-CM: S42.201A  ICD-9-CM: 812.00 4/28/2025 - Present    Stage 3a chronic kidney disease ICD-10-CM: N18.31  ICD-9-CM: 585.3 4/28/2025 - Present    Type 2 diabetes mellitus ICD-10-CM: E11.9  ICD-9-CM: 250.00 4/28/2025 - Present    * (Principal) Seizure ICD-10-CM: R56.9  ICD-9-CM: 780.39 4/27/2025 - Present    Mixed hyperlipidemia ICD-10-CM: E78.2  ICD-9-CM: 272.2 12/27/2019 - Present    Essential hypertension ICD-10-CM: I10  ICD-9-CM: 401.9 4/2/2019 - Present       Non-Hospital    Vitamin D deficiency ICD-10-CM: E55.9  ICD-9-CM: 268.9 6/23/2020 - Present    HSV-1 (herpes simplex virus 1) infection ICD-10-CM: B00.9  ICD-9-CM: 054.9 12/23/2019 - Present    CKD (chronic kidney disease) stage 2, GFR 60-89 ml/min ICD-10-CM: N18.2  ICD-9-CM: 585.2 4/3/2019 - Present    Prediabetes ICD-10-CM: R73.03  ICD-9-CM: 790.29 4/3/2019 - Present        Emergency Department Notes        Ramon Guidry APRN at 04/27/25 1237        Procedure Orders    1. Critical Care [[id number]] ordered by Ramon Guidry APRN                 Subjective   History of Present Illness  Patient is a 55-year-old male who presents accompanied by his significant other, complaining of nausea, rapid breathing, malaise, chills, elevated blood pressures.  In addition, his fiancée reports that he has had reduced appetite for the last 1 month, without weight loss.  He reports he had a job change and lost insurance and stopped getting refills of his antihypertensive and antilipid medication several years ago.  At that time he was borderline diabetic, with concern for renal insufficiency.  He reported the nausea began to be more profound this early morning.    Review of  Systems   Constitutional:  Positive for chills.   HENT: Negative.     Respiratory:  Positive for shortness of breath.    Cardiovascular: Negative.    Gastrointestinal:  Positive for nausea.   Genitourinary: Negative.    Musculoskeletal: Negative.    Skin: Negative.    Neurological: Negative.        Past Medical History:   Diagnosis Date    Hyperlipidemia     Hypertension     Kidney disease        No Known Allergies    Past Surgical History:   Procedure Laterality Date    KNEE ACL RECONSTRUCTION         Family History   Problem Relation Age of Onset    Diabetes Mother     Hypertension Mother     Heart attack Father     Heart attack Paternal Grandfather        Social History     Socioeconomic History    Marital status: Single   Tobacco Use    Smoking status: Every Day     Current packs/day: 0.50     Average packs/day: 0.5 packs/day for 30.0 years (15.0 ttl pk-yrs)     Types: Cigarettes    Smokeless tobacco: Never   Substance and Sexual Activity    Alcohol use: Yes           Objective   Physical Exam  Constitutional:       General: He is not in acute distress.     Appearance: Normal appearance. He is ill-appearing. He is not toxic-appearing.   HENT:      Head: Normocephalic and atraumatic.      Right Ear: External ear normal.      Left Ear: External ear normal.      Nose: Nose normal.   Eyes:      Extraocular Movements: Extraocular movements intact.      Conjunctiva/sclera: Conjunctivae normal.      Pupils: Pupils are equal, round, and reactive to light.   Cardiovascular:      Rate and Rhythm: Normal rate.      Pulses: Normal pulses.   Pulmonary:      Effort: Pulmonary effort is normal. Respiratory distress present.      Breath sounds: Normal breath sounds.   Abdominal:      General: Abdomen is flat. Bowel sounds are normal.      Palpations: Abdomen is soft.      Tenderness: There is no abdominal tenderness.   Musculoskeletal:         General: Normal range of motion.      Cervical back: Normal range of motion.    Skin:     General: Skin is warm and dry.      Capillary Refill: Capillary refill takes less than 2 seconds.   Neurological:      General: No focal deficit present.      Mental Status: He is alert and oriented to person, place, and time.      Motor: Weakness present.      Comments: Neuro exam, following the apparent seizure episode, postictal period of approximately 30 minutes, when patient required physical restraint, constant verbal redirection.   Psychiatric:         Attention and Perception: Attention and perception normal.         Mood and Affect: Mood and affect normal.         Speech: Speech normal.         Behavior: Behavior normal. Behavior is cooperative.         Thought Content: Thought content normal.         Cognition and Memory: Cognition and memory normal.         Critical Care    Performed by: Ramon Guidry APRN  Authorized by: Alfred Back MD    Critical care provider statement:     Critical care time (minutes):  75    Critical care time was exclusive of:  Separately billable procedures and treating other patients    Critical care was necessary to treat or prevent imminent or life-threatening deterioration of the following conditions:  CNS failure or compromise, dehydration, metabolic crisis and circulatory failure    Critical care was time spent personally by me on the following activities:  Blood draw for specimens, development of treatment plan with patient or surrogate, discussions with consultants, discussions with primary provider, evaluation of patient's response to treatment, examination of patient, obtaining history from patient or surrogate, ordering and performing treatments and interventions, ordering and review of laboratory studies, ordering and review of radiographic studies, pulse oximetry, re-evaluation of patient's condition and review of old charts    I assumed direction of critical care for this patient from another provider in my specialty: no      Care discussed with:  admitting provider              ED Course  ED Course as of 04/27/25 1605   Sun Apr 27, 2025   1206 Reviewed patient's initial vital signs, he has marked elevated blood pressure readings, will recheck, and no recent dispense of antihypertensive medications. [JH]   1211 Initial evaluation of the patient ED bed 28 [JH]   1306 CBC resulted, with markedly elevated leukocytosis, hemoconcentration. [JH]   1313 Serum chemistry resulted, with hyperglycemia as expected, mild hyponatremia, lactic acidosis. [JH]   1327 Approximately 1321, unprovoked, without aura, patiently suddenly stopped conversing with the RRT at bedside, began having tonic extremity activity, was unresponsive for approximately 2 minutes, tightly clenched, benzodiazepine has been administered, with CT imaging of the head ordered [JH]   1344 At approximately 1:52p, patient is now becoming more drowsy from the doses of benzodiazepine.  He is responding purposefully as he did after approximately 1 minute with the decerebrate posturing and seizure.  His fiancée now provides additional history that the patient takes p.o. Ativan daily as well as uses recreational marijuana, and has done so for years. [JH]   1408 Contacted the exchange for neurology on-call, received Dr. Schneider's phone number for direct call.  They are attempting to transport patient for head CT, which we will obtain results of to communicate. [JH]   1502 CT imaging officially resulted without acute intercranial abnormality.  I called and spoke to the neurologist on-call, who recommended continued observation, maintenance benzodiazepine dosing according to the patient's reported history of daily use, and EEG, but no Keppra dosing at this time. [JH]   1521 Spoke to Dr. Shaikh, intensivist on-call, who will admit the patient to the ICU, ordered insulin drip initiated. [JH]   1527 Indicated the blood pressure changes on nicardipine drip to the intensivist, and confirmed that we will decrease the  infusion rate to 2.5 mg/h. []   1605 Intensivist at bedside for exam of the patient who is being admitted to the ICU. []      ED Course User Index  [] Ramon Guidry APRN                                                       Medical Decision Making  Given the patient's complaints, his labs and primary care as well as medication management for chronic disease, my exam today, differential includes electrolyte derangement including diabetic ketoacidosis, kidney disease, accelerated versus malignant hypertension, cannot exclude acute coronary syndrome, myocardial ischemia, pneumonia.  Patient will have twelve-lead ECG, plain film imaging the chest, serum screening labs, viral swab, urinalysis collected.  We will communicate workup results, administer antiemetic medication, reevaluate for improvement.  Will consider his disposition including referral to establish or reestablish primary care, and antihypertensive medication refill.  Is agreeable with this plan.  Following the patient's neurologic event, we ordered CT imaging of the head which has been performed, additional lab and urine work, IV nicardipine infusion.  Will contact the hospitalist or intensivist for admission, as well as the neurologist on-call.    Problems Addressed:  Accelerated hypertension: complicated acute illness or injury  Diabetic ketoacidosis without coma associated with type 2 diabetes mellitus: complicated acute illness or injury  Hypomagnesemia: complicated acute illness or injury  Hyponatremia: complicated acute illness or injury  Hypophosphatemia: complicated acute illness or injury  Seizure: complicated acute illness or injury  Type 2 diabetes mellitus with hyperglycemia, without long-term current use of insulin: complicated acute illness or injury    Amount and/or Complexity of Data Reviewed  Labs: ordered.  Radiology: ordered.  ECG/medicine tests: ordered.    Risk  OTC drugs.  Prescription drug management.  Decision regarding  hospitalization.        Final diagnoses:   Diabetic ketoacidosis without coma associated with type 2 diabetes mellitus   Seizure   Accelerated hypertension   Hyponatremia   Type 2 diabetes mellitus with hyperglycemia, without long-term current use of insulin   Hypophosphatemia   Hypomagnesemia       ED Disposition  ED Disposition       ED Disposition   Decision to Admit    Condition   --    Comment   Level of Care: Critical Care [6]   Diagnosis: Seizure [205090]   Certification: I Certify That Inpatient Hospital Services Are Medically Necessary For Greater Than 2 Midnights                 No follow-up provider specified.       Medication List      No changes were made to your prescriptions during this visit.            Ramon Guidry APRN  04/27/25 1818      Electronically signed by Ramon Guidry APRN at 04/27/25 1818       Vital Signs (last 2 days)       Date/Time Temp Temp src Pulse Resp BP Patient Position SpO2    04/28/25 1300 -- -- 95 -- 182/93 -- 92    04/28/25 1150 -- -- 97 -- 164/84 -- --    04/28/25 1145 -- -- 90 -- 164/84 -- 93    04/28/25 1130 -- -- 101 -- 166/82 -- 94    04/28/25 1115 -- -- 99 -- 171/90 -- 94    04/28/25 1100 -- -- 96 -- 166/89 -- 94    04/28/25 1045 -- -- 95 -- 185/87 -- 95    04/28/25 1041 -- -- 95 -- 175/83 -- --    04/28/25 1030 -- -- 107 -- 175/83 -- 93    04/28/25 1015 -- -- 104 -- 146/123 -- 93    04/28/25 1000 -- -- 96 -- 178/87 -- 94    04/28/25 0945 -- -- 97 -- 171/86 -- 94    04/28/25 0930 -- -- 98 -- 170/88 -- 95    04/28/25 0915 -- -- 98 -- 174/84 -- 94    04/28/25 0900 -- -- 100 -- -- -- --    04/28/25 0845 -- -- 106 -- 173/87 -- --    04/28/25 0830 -- -- 99 -- 167/86 -- --    04/28/25 0815 -- -- 100 -- 163/93 -- --    04/28/25 0801 -- -- 96 -- 136/79 -- --    04/28/25 0800 97.7 (36.5) Oral 95 18 136/79 Lying 94    04/28/25 0745 -- -- 103 -- 140/87 -- 90    04/28/25 0730 -- -- 102 -- 161/87 -- 94    04/28/25 0715 -- -- 98 -- 172/84 -- 92    04/28/25 0700 -- -- 103 --  170/84 -- 85    04/28/25 0645 -- -- 95 -- 170/94 -- 93    04/28/25 0638 -- -- 95 -- 179/89 -- 93    04/28/25 0630 -- -- 91 -- 179/89 -- 92    04/28/25 0615 -- -- 96 -- 179/90 -- 94    04/28/25 0600 -- -- 93 -- 170/89 -- 94    04/28/25 0545 -- -- 93 -- 192/94 -- 94    04/28/25 0530 -- -- 92 -- 186/102 -- 95    04/28/25 0526 -- -- 92 -- 186/102 -- 95    04/28/25 0525 -- -- 91 -- 187/104 -- --    04/28/25 0515 -- -- 92 -- -- -- 95    04/28/25 0500 -- -- 96 -- 199/98 -- 93    04/28/25 0445 -- -- 91 -- -- -- 93    04/28/25 0430 97.5 (36.4) Oral 95 -- 177/96 -- 94    04/28/25 0415 -- -- 94 -- -- -- 95    04/28/25 0400 -- -- 96 -- 186/92 -- 95    04/28/25 0345 -- -- 92 -- 164/92 -- 94    04/28/25 0330 -- -- 93 -- 175/94 -- 90    04/28/25 0315 -- -- 92 -- 161/87 -- 94    04/28/25 0300 -- -- 101 -- 175/93 -- 91    04/28/25 0245 -- -- 92 -- 157/84 -- 94    04/28/25 0230 -- -- 95 -- 151/79 -- 94    04/28/25 0215 -- -- 92 -- 139/77 -- 92    04/28/25 0200 -- -- 101 -- 162/84 -- 95    04/28/25 0145 -- -- 92 -- 132/80 -- 91    04/28/25 0142 -- -- 90 -- 126/82 -- 95    04/28/25 0130 -- -- 100 -- -- -- 94    04/28/25 0115 -- -- 96 -- -- -- 96    04/28/25 0100 -- -- 97 -- -- -- 94    04/28/25 0046 -- -- 103 -- 122/83 -- 96 04/28/25 0045 -- -- 101 -- -- -- 97    04/28/25 0000 -- -- -- -- 148/95  -- --    BP: MRI at 04/28/25 0000    04/27/25 2300 -- -- 89 -- 141/81 -- 95 04/27/25 2245 -- -- 99 -- 167/84 -- 95 04/27/25 2230 -- -- 91 -- 140/77 -- 95 04/27/25 2215 -- -- 99 -- 158/132 -- 95 04/27/25 2200 -- -- 98 -- 163/95 -- 95 04/27/25 2145 -- -- 100 -- 155/78 -- 94 04/27/25 2130 -- -- 104 -- 156/80 -- 95 04/27/25 2115 -- -- 103 -- 163/84 -- 96    04/27/25 2100 98.3 (36.8) Oral 99 16 159/81 Lying 95    04/27/25 2057 -- -- 98 -- 162/77 -- 95 04/27/25 2045 -- -- 101 -- 162/77 -- 95 04/27/25 2030 -- -- 101 -- 159/92 -- 95 04/27/25 2015 -- -- 101 -- 151/84 -- 95 04/27/25 2000 -- -- 102 -- 160/84 -- 95     04/27/25 1945 -- -- 101 -- 158/85 -- 95 04/27/25 1933 -- -- 101 -- 160/84 -- --    04/27/25 1930 -- -- 101 -- 160/84 -- 95 04/27/25 1928 -- -- 101 -- 195/140 -- 95 04/27/25 1915 -- -- 101 -- -- -- 95    04/27/25 1900 -- -- 102 -- 179/99 -- 95 04/27/25 1845 -- -- 99 -- 187/92 -- 96    04/27/25 1830 -- -- 98 -- 180/109 -- 97    04/27/25 1815 -- -- 112 -- 160/95 -- 96 04/27/25 1800 -- -- 97 -- 167/92 -- 95 04/27/25 1745 -- -- 101 -- 177/97 -- 93 04/27/25 1730 -- -- 101 -- 186/79 -- 94 04/27/25 1715 -- -- 103 -- 179/82 -- 93 04/27/25 1700 -- -- 98 -- 163/88 -- 93 04/27/25 1649 98.6 (37) Oral 105 22 174/94 Lying 94    04/27/25 1630 -- -- 97 -- -- -- 96 04/27/25 1619 -- -- -- 28 -- -- --    04/27/25 1600 -- -- 98 -- 178/101 -- 94 04/27/25 1545 -- -- 101 -- 183/108 -- 94 04/27/25 1530 -- -- 102 -- 175/95 -- 95 04/27/25 1524 -- -- 98 -- -- -- 94    04/27/25 1522 -- -- 100 -- -- -- 95 04/27/25 1515 -- -- 124 -- 163/72 -- 96    04/27/25 1500 -- -- 101 -- 150/59 -- 93 04/27/25 1445 -- -- 112 -- 173/99 -- 93 04/27/25 1430 -- -- 114 -- -- -- 94    04/27/25 1405 -- -- 124 -- 162/84 -- 93    04/27/25 1400 -- -- 141 -- -- -- 93    04/27/25 1358 -- -- -- -- 210/102 -- 94    04/27/25 1349 -- -- 151 -- -- -- 95    04/27/25 1330 -- -- 151 -- -- -- 92    04/27/25 1323 -- -- 158 -- -- -- 98    04/27/25 1300 -- -- 71 -- -- -- 99    04/27/25 1230 -- -- 74 -- -- -- 100    04/27/25 1225 -- -- 91 -- 253/139 -- 100    04/27/25 1134 97.7 (36.5) Oral 93 22 238/145  Sitting 99    BP: took twice second was +++/152 at 04/27/25 1134          Oxygen Therapy (last day)       Date/Time SpO2 Device (Oxygen Therapy) Flow (L/min) (Oxygen Therapy) Oxygen Concentration (%) ETCO2 (mmHg)    04/28/25 1300 92 -- -- -- --    04/28/25 1145 93 -- -- -- --    04/28/25 1130 94 -- -- -- --    04/28/25 1115 94 -- -- -- --    04/28/25 1100 94 -- -- -- --    04/28/25 1045 95 -- -- -- --    04/28/25 1030 93 -- -- -- --     04/28/25 1015 93 -- -- -- --    04/28/25 1000 94 -- -- -- --    04/28/25 0945 94 -- -- -- --    04/28/25 0930 95 -- -- -- --    04/28/25 0915 94 -- -- -- --    04/28/25 0800 94 room air -- -- --    04/28/25 0745 90 -- -- -- --    04/28/25 0730 94 -- -- -- --    04/28/25 0715 92 -- -- -- --    04/28/25 0700 85 -- -- -- --    04/28/25 0645 93 -- -- -- --    04/28/25 0638 93 -- -- -- --    04/28/25 0630 92 -- -- -- --    04/28/25 0615 94 -- -- -- --    04/28/25 0600 94 nasal cannula 2 -- --    04/28/25 0545 94 -- -- -- --    04/28/25 0530 95 -- -- -- --    04/28/25 0526 95 -- -- -- --    04/28/25 0515 95 -- -- -- --    04/28/25 0500 93 -- -- -- --    04/28/25 0445 93 -- -- -- --    04/28/25 0430 94 -- -- -- --    04/28/25 0415 95 -- -- -- --    04/28/25 0400 95 nasal cannula 2 -- --    04/28/25 0345 94 -- -- -- --    04/28/25 0330 90 -- -- -- --    04/28/25 0315 94 -- -- -- --    04/28/25 0300 91 -- -- -- --    04/28/25 0245 94 -- -- -- --    04/28/25 0230 94 -- -- -- --    04/28/25 0215 92 -- -- -- --    04/28/25 0200 95 nasal cannula 2 -- --    04/28/25 0145 91 -- -- -- --    04/28/25 0142 95 -- -- -- --    04/28/25 0130 94 -- -- -- --    04/28/25 0115 96 -- -- -- --    04/28/25 0100 94 room air -- -- --    04/28/25 0046 96 -- -- -- --    04/28/25 0045 97 -- -- -- --    04/27/25 2300 95 -- -- -- --    04/27/25 2245 95 -- -- -- --    04/27/25 2230 95 -- -- -- --    04/27/25 2215 95 -- -- -- --    04/27/25 2200 95 room air -- -- --    04/27/25 2145 94 -- -- -- --    04/27/25 2130 95 -- -- -- --    04/27/25 2115 96 -- -- -- --    04/27/25 2100 95 room air -- -- --    04/27/25 2057 95 -- -- -- --    04/27/25 2045 95 -- -- -- --    04/27/25 2030 95 -- -- -- --    04/27/25 2015 95 -- -- -- --    04/27/25 2000 95 room air -- -- --    04/27/25 1945 95 -- -- -- --    04/27/25 1930 95 -- -- -- --    04/27/25 1928 95 -- -- -- --    04/27/25 1915 95 -- -- -- --    04/27/25 1900 95 -- -- -- --    04/27/25 1845 96 -- -- -- --     04/27/25 1830 97 -- -- -- --    04/27/25 1815 96 -- -- -- --    04/27/25 1800 95 -- -- -- --    04/27/25 1745 93 -- -- -- --    04/27/25 1730 94 -- -- -- --    04/27/25 1715 93 -- -- -- --    04/27/25 1700 93 -- -- -- --    04/27/25 1649 94 room air -- -- --    04/27/25 1630 96 -- -- -- --    04/27/25 1600 94 -- -- -- --    04/27/25 1545 94 -- -- -- --    04/27/25 1530 95 -- -- -- --    04/27/25 1524 94 -- -- -- --    04/27/25 1522 95 -- -- -- --    04/27/25 1515 96 -- -- -- --    04/27/25 1500 93 -- -- -- --    04/27/25 1445 93 -- -- -- --    04/27/25 1430 94 -- -- -- --    04/27/25 1405 93 -- -- -- --    04/27/25 1400 93 -- -- -- --    04/27/25 1358 94 -- -- -- --    04/27/25 1349 95 -- -- -- --    04/27/25 1330 92 -- -- -- --    04/27/25 1323 98 -- -- -- --    04/27/25 1300 99 -- -- -- --    04/27/25 1230 100 -- -- -- --    04/27/25 1225 100 -- -- -- --    04/27/25 1134 99 room air -- -- --          Current Facility-Administered Medications   Medication Dose Route Frequency Provider Last Rate Last Admin    sennosides-docusate (PERICOLACE) 8.6-50 MG per tablet 2 tablet  2 tablet Oral BID Tre Shaikh MD        And    polyethylene glycol (MIRALAX) packet 17 g  17 g Oral Daily PRN Tre Shaikh MD        And    bisacodyl (DULCOLAX) EC tablet 5 mg  5 mg Oral Daily PRN Tre Shaikh MD        And    bisacodyl (DULCOLAX) suppository 10 mg  10 mg Rectal Daily PRN Tre Shaikh MD        dextrose (D50W) (25 g/50 mL) IV injection 25 g  25 g Intravenous Q15 Min PRN Prebble, Matheus, RPH        dextrose (GLUTOSE) oral gel 15 g  15 g Oral Q15 Min PRN Prebble, Matheus, RPH        diazePAM (VALIUM) tablet 10 mg  10 mg Oral Q6H Juan C Alvares, DO   10 mg at 04/28/25 1043    folic acid 1 mg in sodium chloride 0.9 % 50 mL IVPB  1 mg Intravenous Daily Elmira Krueger C, APRN 100 mL/hr at 04/28/25 0837 1 mg at 04/28/25 0837    glucagon (GLUCAGEN) injection 1 mg  1 mg Intramuscular Q15 Min PRN Prebble,  Matheus, RPH        hydrALAZINE (APRESOLINE) tablet 25 mg  25 mg Oral Q8H PRN Juan C Alvares, DO        HYDROcodone-acetaminophen (NORCO) 5-325 MG per tablet 1 tablet  1 tablet Oral Q4H PRN Elmira Krueger, APRN        HYDROmorphone (DILAUDID) injection 0.5 mg  0.5 mg Intravenous Once PRN Elmira Krueger C, APRN        insulin glargine (LANTUS, SEMGLEE) injection 10 Units  10 Units Subcutaneous Daily Juan C Alvares, DO   10 Units at 04/28/25 1047    Insulin Lispro (humaLOG) injection 2-9 Units  2-9 Units Subcutaneous 4x Daily AC & at Bedtime Juan C Alvares DO        lisinopril (PRINIVIL,ZESTRIL) tablet 20 mg  20 mg Oral Q24H Juan C Alvares, DO   20 mg at 04/28/25 1043    LORazepam (ATIVAN) injection 1 mg  1 mg Intravenous Q4H PRN Tre Shaikh MD        LORazepam (ATIVAN) injection 1 mg  1 mg Intravenous Q8H PRN Juan C Alvares DO        mupirocin (BACTROBAN) 2 % nasal ointment 1 Application  1 Application Each Nare BID Tre Shaikh MD   1 Application at 04/28/25 0837    niCARdipine (CARDENE) 25mg in 250mL NS infusion  5-15 mg/hr Intravenous Titrated Ramon Guidry APRN   Stopped at 04/28/25 1150    nitroglycerin (NITROSTAT) SL tablet 0.4 mg  0.4 mg Sublingual Q5 Min PRN Tre Shaikh MD        sodium bicarbonate 8.4 % 150 mEq in dextrose (D5W) 5 % 1,000 mL infusion (greater than 100 mEq)  150 mEq Intravenous Continuous Sushil Thornton MD        Sodium Chloride (PF) 0.9 % 10 mL  10 mL Intravenous PRN Alfred Back MD        sodium chloride 0.9 % flush 10 mL  10 mL Intravenous Q12H Tre Shaikh MD   10 mL at 04/28/25 0837    sodium chloride 0.9 % flush 10 mL  10 mL Intravenous PRN Tre Shaikh MD        sodium chloride 0.9 % infusion 40 mL  40 mL Intravenous PRN Tre Shaikh MD         Lab Results (all)       Procedure Component Value Units Date/Time    CK [[id number]]  (Abnormal) Collected: 04/28/25 1258    Specimen: Blood  Updated: 04/28/25 1346     Creatine Kinase 4,801 U/L     Lactate Dehydrogenase [[id number]]  (Abnormal) Collected: 04/28/25 1258    Specimen: Blood Updated: 04/28/25 1338      U/L     Ferritin [[id number]]  (Abnormal) Collected: 04/28/25 1258    Specimen: Blood Updated: 04/28/25 1335     Ferritin 515.30 ng/mL     Narrative:      Results may be falsely decreased if patient taking Biotin.      Potassium [[id number]]  (Normal) Collected: 04/28/25 1258    Specimen: Blood Updated: 04/28/25 1332     Potassium 4.2 mmol/L      Comment: Slight hemolysis detected by analyzer. Result may be falsely elevated.       Uric Acid [[id number]]  (Normal) Collected: 04/28/25 1258    Specimen: Blood Updated: 04/28/25 1332     Uric Acid 5.8 mg/dL      Comment: Falsely depressed results may occur on samples drawn from patients receiving N-Acetylcysteine (NAC) or Metamizole.       Iron Profile [[id number]]  (Abnormal) Collected: 04/28/25 1258    Specimen: Blood Updated: 04/28/25 1332     Iron 46 mcg/dL      Iron Saturation (TSAT) 15 %      Transferrin 207 mg/dL      TIBC 308 mcg/dL     POC Glucose Once [[id number]]  (Normal) Collected: 04/28/25 1127    Specimen: Blood Updated: 04/28/25 1129     Glucose 128 mg/dL     POC Glucose Once [[id number]]  (Abnormal) Collected: 04/28/25 1046    Specimen: Blood Updated: 04/28/25 1126     Glucose 149 mg/dL     Basic Metabolic Panel [[id number]]  (Abnormal) Collected: 04/28/25 0850    Specimen: Blood Updated: 04/28/25 0929     Glucose 171 mg/dL      BUN 11 mg/dL      Creatinine 1.47 mg/dL      Sodium 132 mmol/L      Potassium 4.1 mmol/L      Comment: Slight hemolysis detected by analyzer. Result may be falsely elevated.        Chloride 103 mmol/L      CO2 17.0 mmol/L      Calcium 8.0 mg/dL      BUN/Creatinine Ratio 7.5     Anion Gap 12.0 mmol/L      eGFR 56.3 mL/min/1.73     Narrative:      GFR Categories in Chronic Kidney Disease (CKD)      GFR Category          GFR (mL/min/1.73)    Interpretation  G1                      90 or greater         Normal or high (1)  G2                      60-89                Mild decrease (1)  G3a                   45-59                Mild to moderate decrease  G3b                   30-44                Moderate to severe decrease  G4                    15-29                Severe decrease  G5                    14 or less           Kidney failure          (1)In the absence of evidence of kidney disease, neither GFR category G1 or G2 fulfill the criteria for CKD.    eGFR calculation 2021 CKD-EPI creatinine equation, which does not include race as a factor    POC Glucose Once [[id number]]  (Abnormal) Collected: 04/28/25 0909    Specimen: Blood Updated: 04/28/25 0910     Glucose 152 mg/dL     POC Glucose Once [[id number]]  (Abnormal) Collected: 04/28/25 0756    Specimen: Blood Updated: 04/28/25 0757     Glucose 179 mg/dL     Osmolality, Serum [[id number]]  (Normal) Collected: 04/28/25 0659    Specimen: Blood Updated: 04/28/25 0757     Osmolality 279 mOsm/kg     Osmolality, Urine - Urine, Clean Catch [[id number]]  (Normal) Collected: 04/28/25 0702    Specimen: Urine, Clean Catch Updated: 04/28/25 0748     Osmolality, Urine 324 mOsm/kg     Sodium, Urine, Random - Urine, Clean Catch [[id number]] Collected: 04/28/25 0702    Specimen: Urine, Clean Catch Updated: 04/28/25 0732     Sodium, Urine 36 mmol/L     Narrative:      Reference intervals for random urine have not been established.  Clinical usage is dependent upon physician's interpretation in combination with other laboratory tests.       POC Glucose Once [[id number]]  (Abnormal) Collected: 04/28/25 0653    Specimen: Blood Updated: 04/28/25 0654     Glucose 181 mg/dL     POC Glucose Once [[id number]]  (Normal) Collected: 04/28/25 0118    Specimen: Blood Updated: 04/28/25 0619     Glucose 92 mg/dL     Magnesium [[id number]]  (Abnormal) Collected: 04/28/25 0428    Specimen: Blood Updated: 04/28/25 0552     Magnesium 3.2 mg/dL     Basic  Metabolic Panel [[id number]]  (Abnormal) Collected: 04/28/25 0428    Specimen: Blood Updated: 04/28/25 0552     Glucose 169 mg/dL      BUN 11 mg/dL      Creatinine 1.43 mg/dL      Sodium 125 mmol/L      Potassium 4.1 mmol/L      Comment: Slight hemolysis detected by analyzer. Result may be falsely elevated.        Chloride 101 mmol/L      CO2 11.0 mmol/L      Calcium 7.7 mg/dL      BUN/Creatinine Ratio 7.7     Anion Gap 13.0 mmol/L      eGFR 58.2 mL/min/1.73     Narrative:      GFR Categories in Chronic Kidney Disease (CKD)      GFR Category          GFR (mL/min/1.73)    Interpretation  G1                     90 or greater         Normal or high (1)  G2                      60-89                Mild decrease (1)  G3a                   45-59                Mild to moderate decrease  G3b                   30-44                Moderate to severe decrease  G4                    15-29                Severe decrease  G5                    14 or less           Kidney failure          (1)In the absence of evidence of kidney disease, neither GFR category G1 or G2 fulfill the criteria for CKD.    eGFR calculation 2021 CKD-EPI creatinine equation, which does not include race as a factor    CBC & Differential [[id number]]  (Abnormal) Collected: 04/28/25 0428    Specimen: Blood Updated: 04/28/25 0546    Narrative:      The following orders were created for panel order CBC & Differential.  Procedure                               Abnormality         Status                     ---------                               -----------         ------                     CBC Auto Differential[[id number]]        Abnormal            Final result                 Please view results for these tests on the individual orders.    CBC Auto Differential [[id number]]  (Abnormal) Collected: 04/28/25 0428    Specimen: Blood Updated: 04/28/25 0546     WBC 20.20 10*3/mm3      RBC 5.51 10*6/mm3      Hemoglobin 18.0 g/dL      Hematocrit 55.0 %      MCV 99.8 fL       MCH 32.7 pg      MCHC 32.7 g/dL      RDW 12.7 %      RDW-SD 47.1 fl      MPV 9.9 fL      Platelets 196 10*3/mm3      Neutrophil % 85.3 %      Lymphocyte % 6.7 %      Monocyte % 7.0 %      Eosinophil % 0.0 %      Basophil % 0.2 %      Immature Grans % 0.8 %      Neutrophils, Absolute 17.21 10*3/mm3      Lymphocytes, Absolute 1.35 10*3/mm3      Monocytes, Absolute 1.42 10*3/mm3      Eosinophils, Absolute 0.01 10*3/mm3      Basophils, Absolute 0.04 10*3/mm3      Immature Grans, Absolute 0.17 10*3/mm3      nRBC 0.0 /100 WBC     POC Glucose Once [[id number]]  (Abnormal) Collected: 04/28/25 0508    Specimen: Blood Updated: 04/28/25 0510     Glucose 184 mg/dL     POC Glucose Once [[id number]]  (Abnormal) Collected: 04/28/25 0358    Specimen: Blood Updated: 04/28/25 0359     Glucose 158 mg/dL     POC Glucose Once [[id number]]  (Normal) Collected: 04/28/25 0225    Specimen: Blood Updated: 04/28/25 0226     Glucose 124 mg/dL     Basic Metabolic Panel [[id number]]  (Abnormal) Collected: 04/28/25 0122    Specimen: Blood Updated: 04/28/25 0152     Glucose 92 mg/dL      BUN 11 mg/dL      Creatinine 1.50 mg/dL      Sodium 129 mmol/L      Potassium 3.5 mmol/L      Comment: Slight hemolysis detected by analyzer. Result may be falsely elevated.        Chloride 101 mmol/L      CO2 15.0 mmol/L      Calcium 7.9 mg/dL      BUN/Creatinine Ratio 7.3     Anion Gap 13.0 mmol/L      eGFR 55.0 mL/min/1.73     Narrative:      GFR Categories in Chronic Kidney Disease (CKD)      GFR Category          GFR (mL/min/1.73)    Interpretation  G1                     90 or greater         Normal or high (1)  G2                      60-89                Mild decrease (1)  G3a                   45-59                Mild to moderate decrease  G3b                   30-44                Moderate to severe decrease  G4                    15-29                Severe decrease  G5                    14 or less           Kidney failure          (1)In the absence  of evidence of kidney disease, neither GFR category G1 or G2 fulfill the criteria for CKD.    eGFR calculation 2021 CKD-EPI creatinine equation, which does not include race as a factor    POC Glucose Once [[id number]]  (Normal) Collected: 04/28/25 0052    Specimen: Blood Updated: 04/28/25 0053     Glucose 92 mg/dL     POC Glucose Once [[id number]]  (Abnormal) Collected: 04/27/25 2311    Specimen: Blood Updated: 04/27/25 2312     Glucose 166 mg/dL     POC Glucose Once [[id number]]  (Abnormal) Collected: 04/27/25 2204    Specimen: Blood Updated: 04/27/25 2206     Glucose 164 mg/dL     Basic Metabolic Panel [[id number]]  (Abnormal) Collected: 04/27/25 2107    Specimen: Blood Updated: 04/27/25 2135     Glucose 178 mg/dL      BUN 10 mg/dL      Creatinine 1.30 mg/dL      Sodium 127 mmol/L      Potassium 3.9 mmol/L      Comment: Slight hemolysis detected by analyzer. Result may be falsely elevated.        Chloride 98 mmol/L      CO2 16.0 mmol/L      Calcium 7.7 mg/dL      BUN/Creatinine Ratio 7.7     Anion Gap 13.0 mmol/L      eGFR 65.3 mL/min/1.73     Narrative:      GFR Categories in Chronic Kidney Disease (CKD)      GFR Category          GFR (mL/min/1.73)    Interpretation  G1                     90 or greater         Normal or high (1)  G2                      60-89                Mild decrease (1)  G3a                   45-59                Mild to moderate decrease  G3b                   30-44                Moderate to severe decrease  G4                    15-29                Severe decrease  G5                    14 or less           Kidney failure          (1)In the absence of evidence of kidney disease, neither GFR category G1 or G2 fulfill the criteria for CKD.    eGFR calculation 2021 CKD-EPI creatinine equation, which does not include race as a factor    STAT Lactic Acid, Reflex [[id number]]  (Normal) Collected: 04/27/25 2107    Specimen: Blood Updated: 04/27/25 2132     Lactate 1.6 mmol/L      Comment: Falsely  depressed results may occur on samples drawn from patients receiving N-Acetylcysteine (NAC) or Metamizole.       POC Glucose Once [[id number]]  (Abnormal) Collected: 04/27/25 2043    Specimen: Blood Updated: 04/27/25 2044     Glucose 183 mg/dL     POC Glucose Once [[id number]]  (Abnormal) Collected: 04/27/25 1926    Specimen: Blood Updated: 04/27/25 1927     Glucose 202 mg/dL     STAT Lactic Acid, Reflex [[id number]]  (Abnormal) Collected: 04/27/25 1844    Specimen: Blood Updated: 04/27/25 1909     Lactate 2.7 mmol/L      Comment: Falsely depressed results may occur on samples drawn from patients receiving N-Acetylcysteine (NAC) or Metamizole.       Osmolality, Urine - Urine, Clean Catch [[id number]]  (Normal) Collected: 04/27/25 1817    Specimen: Urine, Clean Catch Updated: 04/27/25 1856     Osmolality, Urine 555 mOsm/kg     Urinalysis, Microscopic Only - Urine, Clean Catch [[id number]]  (Abnormal) Collected: 04/27/25 1817    Specimen: Urine, Clean Catch Updated: 04/27/25 1847     RBC, UA 3-5 /HPF      WBC, UA 3-5 /HPF      Bacteria, UA Trace /HPF      Squamous Epithelial Cells, UA 3-6 /HPF      Methodology Manual Light Microscopy    Urine Drug Screen - Urine, Clean Catch [[id number]]  (Abnormal) Collected: 04/27/25 1817    Specimen: Urine, Clean Catch Updated: 04/27/25 1837     THC, Screen, Urine Positive     Phencyclidine (PCP), Urine Negative     Cocaine Screen, Urine Negative     Methamphetamine, Ur Negative     Opiate Screen Positive     Amphetamine Screen, Urine Negative     Benzodiazepine Screen, Urine Positive     Tricyclic Antidepressants Screen Negative     Methadone Screen, Urine Negative     Barbiturates Screen, Urine Negative     Oxycodone Screen, Urine Negative     Buprenorphine, Screen, Urine Negative    Narrative:      Cutoff For Drugs Screened:    Amphetamines               500 ng/ml  Barbiturates               200 ng/ml  Benzodiazepines            150 ng/ml  Cocaine                    150  ng/ml  Methadone                  200 ng/ml  Opiates                    100 ng/ml  Phencyclidine               25 ng/ml  THC                         50 ng/ml  Methamphetamine            500 ng/ml  Tricyclic Antidepressants  300 ng/ml  Oxycodone                  100 ng/ml  Buprenorphine               10 ng/ml    The normal value for all drugs tested is negative. This report includes unconfirmed screening results, with the cutoff values listed, to be used for medical treatment purposes only.  Unconfirmed results must not be used for non-medical purposes such as employment or legal testing.  Clinical consideration should be applied to any drug of abuse test, particularly when unconfirmed results are used.      Fentanyl, Urine - Urine, Clean Catch [[id number]]  (Normal) Collected: 04/27/25 1817    Specimen: Urine, Clean Catch Updated: 04/27/25 1837     Fentanyl, Urine Negative    Narrative:      Negative Threshold:      Fentanyl 5 ng/mL     The normal value for the drug tested is negative. This report includes final unconfirmed screening results to be used for medical treatment purposes only. Unconfirmed results must not be used for non-medical purposes such as employment or legal testing. Clinical consideration should be applied to any drug of abuse test, particularly when unconfirmed results are used.           Urinalysis With Microscopic If Indicated (No Culture) - Urine, Clean Catch [[id number]]  (Abnormal) Collected: 04/27/25 1817    Specimen: Urine, Clean Catch Updated: 04/27/25 1836     Color, UA Yellow     Appearance, UA Clear     pH, UA 5.5     Specific Gravity, UA 1.023     Glucose, UA >=1000 mg/dL (3+)     Ketones, UA 15 mg/dL (1+)     Bilirubin, UA Negative     Blood, UA Large (3+)     Protein, UA >=300 mg/dL (3+)     Leuk Esterase, UA Negative     Nitrite, UA Negative     Urobilinogen, UA 0.2 E.U./dL    POC Glucose Once [[id number]]  (Abnormal) Collected: 04/27/25 1824    Specimen: Blood Updated: 04/27/25 1826      Glucose 234 mg/dL     CBC & Differential [[id number]]  (Abnormal) Collected: 04/27/25 1633    Specimen: Blood Updated: 04/27/25 1712    Narrative:      The following orders were created for panel order CBC & Differential.  Procedure                               Abnormality         Status                     ---------                               -----------         ------                     CBC Auto Differential[[id number]]        Abnormal            Final result               Scan Slide[[id number]]                                                                    Please view results for these tests on the individual orders.    CBC Auto Differential [[id number]]  (Abnormal) Collected: 04/27/25 1633    Specimen: Blood Updated: 04/27/25 1712     WBC 26.30 10*3/mm3      RBC 5.62 10*6/mm3      Hemoglobin 18.4 g/dL      Hematocrit 51.5 %      MCV 91.6 fL      MCH 32.7 pg      MCHC 35.7 g/dL      RDW 12.0 %      RDW-SD 40.5 fl      MPV 10.1 fL      Platelets 223 10*3/mm3     Narrative:      The previously reported component NRBC is no longer being reported. Previous result was 0.0 /100 WBC (Reference Range: 0.0-0.2 /100 WBC) on 4/27/2025 at 1650 EDT.    Manual Differential [[id number]]  (Abnormal) Collected: 04/27/25 1633    Specimen: Blood Updated: 04/27/25 1712     Neutrophil % 83.0 %      Lymphocyte % 4.0 %      Monocyte % 5.0 %      Eosinophil % 0.0 %      Basophil % 0.0 %      Bands %  8.0 %      Neutrophils Absolute 23.93 10*3/mm3      Lymphocytes Absolute 1.05 10*3/mm3      Monocytes Absolute 1.32 10*3/mm3      Eosinophils Absolute 0.00 10*3/mm3      Basophils Absolute 0.00 10*3/mm3      RBC Morphology Normal     WBC Morphology Normal     Platelet Morphology Normal    Basic Metabolic Panel [[id number]]  (Abnormal) Collected: 04/27/25 1633    Specimen: Blood Updated: 04/27/25 1659     Glucose 309 mg/dL      BUN 10 mg/dL      Creatinine 1.51 mg/dL      Sodium 126 mmol/L      Potassium 3.9 mmol/L      Comment:  "Slight hemolysis detected by analyzer. Result may be falsely elevated.        Chloride 94 mmol/L      CO2 15.0 mmol/L      Calcium 8.1 mg/dL      BUN/Creatinine Ratio 6.6     Anion Gap 17.0 mmol/L      eGFR 54.5 mL/min/1.73     Narrative:      GFR Categories in Chronic Kidney Disease (CKD)      GFR Category          GFR (mL/min/1.73)    Interpretation  G1                     90 or greater         Normal or high (1)  G2                      60-89                Mild decrease (1)  G3a                   45-59                Mild to moderate decrease  G3b                   30-44                Moderate to severe decrease  G4                    15-29                Severe decrease  G5                    14 or less           Kidney failure          (1)In the absence of evidence of kidney disease, neither GFR category G1 or G2 fulfill the criteria for CKD.    eGFR calculation 2021 CKD-EPI creatinine equation, which does not include race as a factor    POC Glucose Once [[id number]]  (Abnormal) Collected: 04/27/25 1647    Specimen: Blood Updated: 04/27/25 1652     Glucose 270 mg/dL     Procalcitonin [[id number]]  (Normal) Collected: 04/27/25 1443    Specimen: Blood Updated: 04/27/25 1634     Procalcitonin 0.05 ng/mL     Narrative:      As a Marker for Sepsis (Non-Neonates):    1. <0.5 ng/mL represents a low risk of severe sepsis and/or septic shock.  2. >2 ng/mL represents a high risk of severe sepsis and/or septic shock.    As a Marker for Lower Respiratory Tract Infections that require antibiotic therapy:    PCT on Admission    Antibiotic Therapy       6-12 Hrs later    >0.5                Strongly Recommended  >0.25 - <0.5        Recommended   0.1 - 0.25          Discouraged              Remeasure/reassess PCT  <0.1                Strongly Discouraged     Remeasure/reassess PCT    As 28 day mortality risk marker: \"Change in Procalcitonin Result\" (>80% or <=80%) if Day 0 (or Day 1) and Day 4 values are available. Refer to " [email or web address]    Change in PCT <=80%  A decrease of PCT levels below or equal to 80% defines a positive change in PCT test result representing a higher risk for 28-day all-cause mortality of patients diagnosed with severe sepsis for septic shock.    Change in PCT >80%  A decrease of PCT levels of more than 80% defines a negative change in PCT result representing a lower risk for 28-day all-cause mortality of patients diagnosed with severe sepsis or septic shock.       STAT Lactic Acid, Reflex [[id number]]  (Abnormal) Collected: 04/27/25 1549    Specimen: Blood Updated: 04/27/25 1621     Lactate 10.1 mmol/L      Comment: Falsely depressed results may occur on samples drawn from patients receiving N-Acetylcysteine (NAC) or Metamizole.       Blood Culture - Blood, Arm, Left [[id number]] Collected: 04/27/25 1540    Specimen: Blood from Arm, Left Updated: 04/27/25 1608    Blood Culture - Blood, Hand, Right [[id number]] Collected: 04/27/25 1550    Specimen: Blood from Hand, Right Updated: 04/27/25 1607    Phosphorus [[id number]]  (Abnormal) Collected: 04/27/25 1229    Specimen: Blood Updated: 04/27/25 1520     Phosphorus 1.8 mg/dL     High Sensitivity Troponin T 1Hr [[id number]]  (Abnormal) Collected: 04/27/25 1443    Specimen: Blood Updated: 04/27/25 1520     HS Troponin T 60 ng/L      Troponin T Numeric Delta 25 ng/L      Troponin T % Delta 71    Narrative:      High Sensitive Troponin T Reference Range:  <14.0 ng/L- Negative Female for AMI  <22.0 ng/L- Negative Male for AMI  >=14 - Abnormal Female indicating possible myocardial injury.  >=22 - Abnormal Male indicating possible myocardial injury.   Clinicians would have to utilize clinical acumen, EKG, Troponin, and serial changes to determine if it is an Acute Myocardial Infarction or myocardial injury due to an underlying chronic condition.         Osmolality, Serum [[id number]]  (Normal) Collected: 04/27/25 1229    Specimen: Blood Updated: 04/27/25  1519     Osmolality 277 mOsm/kg     POC Glucose Once [[id number]]  (Abnormal) Collected: 04/27/25 1518    Specimen: Blood Updated: 04/27/25 1519     Glucose 287 mg/dL     Blood Gas, Venous With Co-Ox [[id number]]  (Abnormal) Collected: 04/27/25 1442    Specimen: Venous Blood Updated: 04/27/25 1442     Site Nurse/Dr Draw     pH, Venous 7.313 pH Units      Comment: 84 Value below reference range        pCO2, Venous 25.2 mm Hg      Comment: 85 Value below critical limit        pO2, Venous 59.8 mm Hg      Comment: 83 Value above reference range        HCO3, Venous 12.8 mmol/L      Base Excess, Venous -11.0 mmol/L      Hemoglobin, Blood Gas 19.0 g/dL      Oxyhemoglobin Venous 87.4 %      Comment: 83 Value above reference range        Methemoglobin Venous 0.1 %      Carboxyhemoglobin Venous 1.2 %      CO2 Content 13.6 mmol/L      Temperature 37.0     Barometric Pressure for Blood Gas --     Comment: N/A        Modality Room Air     FIO2 21 %      Rate 0 Breaths/minute      PIP 0 cmH2O      Comment: Meter: [id number]     :  [id number]        IPAP 0     EPAP 0     Notified [facility] [place]     Notified By [id number]     Notified Time 04/27/2025 14:43    Beta Hydroxybutyrate Quantitative [[id number]]  (Abnormal) Collected: 04/27/25 1229    Specimen: Blood Updated: 04/27/25 1412     Beta-Hydroxybutyrate Quant 0.668 mmol/L     Narrative:      In the assessment of possible diabetic ketoacidosis, the test should be interpreted along with other clinical and laboratory findings.  A level greater than 1 mmol/L should require further evaluation and levels of more than 3 mmol/L require immediate medical review.    Acetaminophen Level [[id number]]  (Normal) Collected: 04/27/25 1229    Specimen: Blood Updated: 04/27/25 1351     Acetaminophen <5.0 mcg/mL     Salicylate Level [[id number]]  (Normal) Collected: 04/27/25 1229    Specimen: Blood Updated: 04/27/25 1351     Salicylate <0.3 mg/dL     Ethanol [[id number]]  (Normal) Collected:  04/27/25 1229    Specimen: Blood Updated: 04/27/25 1345     Ethanol <10 mg/dL     Narrative:      Elevated lactic acid concentration and lactate dehydrogenase(LD) activity may falsely elevate enzymatically determined ethanol levels. Not for legal purposes.     COVID-19, FLU A/B, RSV PCR 1 HR TAT - Swab, Nasopharynx [[id number]]  (Normal) Collected: 04/27/25 1231    Specimen: Swab from Nasopharynx Updated: 04/27/25 1345     COVID19 Not Detected     Influenza A PCR Not Detected     Influenza B PCR Not Detected     RSV, PCR Not Detected    Narrative:      Fact sheet for providers: https://www.fda.gov/media/855402/download    Fact sheet for patients: https://www.fda.gov/media/148814/download    Test performed by PCR.    TSH [[id number]]  (Normal) Collected: 04/27/25 1229    Specimen: Blood Updated: 04/27/25 1340     TSH 1.370 uIU/mL     Magnesium [[id number]]  (Abnormal) Collected: 04/27/25 1229    Specimen: Blood Updated: 04/27/25 1326     Magnesium 1.5 mg/dL     CBC & Differential [[id number]]  (Abnormal) Collected: 04/27/25 1230    Specimen: Blood Updated: 04/27/25 1324    Narrative:      The following orders were created for panel order CBC & Differential.  Procedure                               Abnormality         Status                     ---------                               -----------         ------                     CBC Auto Differential[[id number]]        Abnormal            Final result               Scan Slide[[id number]]                   Normal              Final result                 Please view results for these tests on the individual orders.    CBC Auto Differential [[id number]]  (Abnormal) Collected: 04/27/25 1230    Specimen: Blood Updated: 04/27/25 1324     WBC 18.63 10*3/mm3      Comment: Modified report. Previous result was 18.82 10*3/mm3 on 4/27/2025 at 1258 EDT.        RBC 6.24 10*6/mm3      Comment: Modified report. Previous result was 6.25 10*6/mm3 on 4/27/2025 at 1258 EDT.        Hemoglobin  20.4 g/dL      Comment: Modified report. Previous result was 20.0 g/dL on 4/27/2025 at 1258 EDT.        Hematocrit 56.8 %      Comment: Modified report. Previous result was 56.9 % on 4/27/2025 at 1258 EDT.        MCV 91.0 fL      MCH 32.7 pg      Comment: Modified report. Previous result was 32.0 pg on 4/27/2025 at 1258 EDT.        MCHC 35.9 g/dL      Comment: Modified report. Previous result was 35.1 g/dL on 4/27/2025 at 1258 EDT.        RDW 11.9 %      Comment: Modified report. Previous result was 12.0 % on 4/27/2025 at 1258 EDT.        RDW-SD 39.8 fl      Comment: Modified report. Previous result was 40.0 fl on 4/27/2025 at 1258 EDT.        MPV 10.3 fL      Comment: Modified report. Previous result was 10.5 fL on 4/27/2025 at 1258 EDT.        Platelets 235 10*3/mm3      Comment: Modified report. Previous result was 237 10*3/mm3 on 4/27/2025 at 1258 EDT.        Neutrophil % 90.4 %      Lymphocyte % 5.1 %      Monocyte % 3.7 %      Eosinophil % 0.0 %      Basophil % 0.3 %      Immature Grans % 0.5 %      Neutrophils, Absolute 16.85 10*3/mm3      Lymphocytes, Absolute 0.95 10*3/mm3      Monocytes, Absolute 0.69 10*3/mm3      Eosinophils, Absolute 0.00 10*3/mm3      Basophils, Absolute 0.05 10*3/mm3      Immature Grans, Absolute 0.09 10*3/mm3      nRBC 0.0 /100 WBC     Narrative:      Appended report. These results have been appended to a previously verified report.    Scan Slide [[id number]]  (Normal) Collected: 04/27/25 1230    Specimen: Blood Updated: 04/27/25 1324     RBC Morphology Normal     WBC Morphology Normal     Platelet Morphology Normal    High Sensitivity Troponin T [[id number]]  (Abnormal) Collected: 04/27/25 1229    Specimen: Blood Updated: 04/27/25 1320     HS Troponin T 35 ng/L     Narrative:      High Sensitive Troponin T Reference Range:  <14.0 ng/L- Negative Female for AMI  <22.0 ng/L- Negative Male for AMI  >=14 - Abnormal Female indicating possible myocardial injury.  >=22 - Abnormal Male  indicating possible myocardial injury.   Clinicians would have to utilize clinical acumen, EKG, Troponin, and serial changes to determine if it is an Acute Myocardial Infarction or myocardial injury due to an underlying chronic condition.         Hemoglobin A1c [[id number]]  (Abnormal) Collected: 04/27/25 1230    Specimen: Blood Updated: 04/27/25 1318     Hemoglobin A1C 6.60 %     Narrative:      Hemoglobin A1C Ranges:    Increased Risk for Diabetes  5.7% to 6.4%  Diabetes                     >= 6.5%  Diabetic Goal                < 7.0%    Comprehensive Metabolic Panel [[id number]]  (Abnormal) Collected: 04/27/25 1229    Specimen: Blood Updated: 04/27/25 1309     Glucose 240 mg/dL      BUN 9 mg/dL      Creatinine 1.16 mg/dL      Sodium 125 mmol/L      Potassium 3.9 mmol/L      Comment: Slight hemolysis detected by analyzer. Result may be falsely elevated.        Chloride 89 mmol/L      CO2 17.0 mmol/L      Calcium 9.1 mg/dL      Total Protein 8.5 g/dL      Albumin 4.7 g/dL      ALT (SGPT) 34 U/L      AST (SGOT) 28 U/L      Alkaline Phosphatase 135 U/L      Total Bilirubin 0.6 mg/dL      Globulin 3.8 gm/dL      Comment: Calculated Result        A/G Ratio 1.2 g/dL      BUN/Creatinine Ratio 7.8     Anion Gap 19.0 mmol/L      eGFR 74.8 mL/min/1.73     Narrative:      GFR Categories in Chronic Kidney Disease (CKD)      GFR Category          GFR (mL/min/1.73)    Interpretation  G1                     90 or greater         Normal or high (1)  G2                      60-89                Mild decrease (1)  G3a                   45-59                Mild to moderate decrease  G3b                   30-44                Moderate to severe decrease  G4                    15-29                Severe decrease  G5                    14 or less           Kidney failure          (1)In the absence of evidence of kidney disease, neither GFR category G1 or G2 fulfill the criteria for CKD.    eGFR calculation 2021 CKD-EPI creatinine  equation, which does not include race as a factor    Lactic Acid, Plasma [[id number]]  (Abnormal) Collected: 04/27/25 1229    Specimen: Blood Updated: 04/27/25 1309     Lactate 3.0 mmol/L      Comment: Falsely depressed results may occur on samples drawn from patients receiving N-Acetylcysteine (NAC) or Metamizole.       Lipase [[id number]]  (Normal) Collected: 04/27/25 1229    Specimen: Blood Updated: 04/27/25 1308     Lipase 25 U/L     [place] Draw [[id number]] Collected: 04/27/25 1229    Specimen: Blood Updated: 04/27/25 1300    Narrative:      The following orders were created for panel order [place] Draw.  Procedure                               Abnormality         Status                     ---------                               -----------         ------                     Green Top (Gel)[[id number]]                                  Final result               Lavender Top[[id number]]                                     Final result               Gold Top - SST[[id number]]                                   Final result               Gomez Top[[id number]]                                         Final result               Light Blue Top[[id number]]                                   Final result                 Please view results for these tests on the individual orders.    Green Top (Gel) [[id number]] Collected: 04/27/25 1229    Specimen: Blood Updated: 04/27/25 1300     Extra Tube Hold for add-ons.     Comment: Auto resulted.       Lavender Top [[id number]] Collected: 04/27/25 1230    Specimen: Blood Updated: 04/27/25 1300     Extra Tube hold for add-on     Comment: Auto resulted       Gold Top - SST [[id number]] Collected: 04/27/25 1229    Specimen: Blood Updated: 04/27/25 1300     Extra Tube Hold for add-ons.     Comment: Auto resulted.       Gray Top [[id number]] Collected: 04/27/25 1229    Specimen: Blood Updated: 04/27/25 1300     Extra Tube Hold for add-ons.     Comment: Auto resulted.       Light Blue Top  [[id number]] Collected: 04/27/25 1229    Specimen: Blood Updated: 04/27/25 1300     Extra Tube Hold for add-ons.     Comment: Auto resulted             Imaging Results (All)       Procedure Component Value Units Date/Time    CT Upper Extremity Right Without Contrast [[id number]] Resulted: 04/28/25 1159     Updated: 04/28/25 1322    MRI Brain With & Without Contrast [[id number]] Collected: 04/28/25 0129     Updated: 04/28/25 0135    Narrative:        MRI BRAIN W WO CONTRAST    Date of Exam: 4/27/2025 11:14 PM EDT    Indication: seizure.     Comparison: CT head 4/27/2025.    Technique:  Routine multiplanar/multisequence sequence images of the brain were obtained before and after the uneventful administration of 20 mL Multihance.      Findings:  There is no diffusion restriction to suggest acute infarct. There is no evidence of acute or chronic intracranial hemorrhage. There is a small focus of encephalomalacia and hemosiderin staining in the inferior left cerebellum, which likely corresponds to   a chronic lacunar infarct. No mass effect or midline shift. No abnormal extra-axial collections.  The major vascular flow voids appear intact. The basal ganglia, brainstem and cerebellum appear within normal limits.  Calvarial and superficial soft   tissue signal is within normal limits.  Orbits appear unremarkable.  The paranasal sinuses and the mastoid air cells appear well aerated.  Midline structures are intact. No abnormal enhancement. No evidence of gray matter heterotopia. The hippocampus   appears normal bilaterally. No evidence of mesial temporal sclerosis. No obvious cortical dysplasia or migrational abnormality.      Impression:      Impression:  1.No acute intracranial abnormality. No epileptogenic focus identified.  2.Small chronic lacunar infarct in the inferior left cerebellum.            Electronically Signed: Conrad Bruce MD    4/28/2025 1:32 AM EDT    Workstation ID: [id number]    XR Shoulder 2+ View Right  [[id number]] Collected: 04/27/25 1843     Updated: 04/27/25 1847    Narrative:      XR SHOULDER 2+ VW RIGHT    Date of Exam: 4/27/2025 6:00 PM EDT    Indication: Shoulder pain    Comparison: 4/27/2025    Findings:  There is an acute comminuted fracture of the proximal humerus. The humeral head appears to be dislocated somewhat posteriorly. AC joint appears within normal limits. Visualized portion of the right ribs are within normal limits      Impression:      Impression:  Acute comminuted displaced fracture of the proximal humerus.      Electronically Signed: Tyler Majano MD    4/27/2025 6:44 PM EDT    Workstation ID: [id number]    CT Head Without Contrast [[id number]] Collected: 04/27/25 1454     Updated: 04/27/25 1459    Narrative:      CT HEAD WO CONTRAST    Date of Exam: 4/27/2025 2:17 PM EDT    Indication: seizure.    Comparison: None available.    Technique: Axial CT images were obtained of the head without contrast administration.  Automated exposure control and iterative construction methods were used.      Findings:  There is no evidence of acute intracranial hemorrhage, mass, midline shift, loss of gray-white matter differentiation, or extra-axial fluid collection.  There is normal ventricular volume. The basal cisterns are patent.     No evidence of fracture.  The paranasal sinuses and mastoid air cells are predominantly well aerated.    The orbits and included soft tissues show no acute abnormality.         Impression:      Impression:  No acute intracranial abnormality.        Electronically Signed: Conrad Nicole MD    4/27/2025 2:56 PM EDT    Workstation ID: [id number]    XR Chest 1 View [[id number]] Collected: 04/27/25 1341     Updated: 04/27/25 1345    Narrative:      XR CHEST 1 VW    Date of Exam: 4/27/2025 1:15 PM EDT    Indication: Tachypnea    Comparison: None available.    Findings:  The cardiomediastinal silhouette is within normal limits. Pulmonary vascularity appears normal. There is no focal  airspace consolidation, pleural effusion, or pneumothorax. There are mild degenerative changes of the thoracic spine.      Impression:      Impression:  No acute cardiopulmonary abnormality.        Electronically Signed: Sean Graf MD    4/27/2025 1:42 PM EDT    Workstation ID: [id number]             Physician Progress Notes (all)        Juan C Alvares,  at 04/28/25 1203            Intensive Care Follow-up     Hospital:  LOS: 1 day   Mr. Juan C Castro, 55 y.o. male is followed for:   Seizure     Subjective       History of present illness:   55-year-old gentleman presented to the emergency room with nausea rapid breathing chills and elevated blood pressure.  He also reports reduced appetite for the last 1 month.  Apparently he lost his job and stopped getting refills on his blood pressure and other medications.  He reports that he has been borderline diabetic.  In the emergency room he was noted to have significantly elevated blood pressure with witnessed seizure.  The seizure was described to me as tonic seizure unresponsive for approximately 2 minutes with teeth tightly clenched.  He received benzodiazepine in the emergency room.  ER physician/ARNP spoke with neurologist Dr. Gonzalez who recommended no Keppra but use scheduled Ativan.  They also recommended stat EEG.  Patient was also noted to be in acute DKA and initiated on insulin drip per Glucomander protocol in the emergency room.  At the time of my evaluation patient was little sleepy but wakes up follows simple commands.  Denies any headache double vision or blurred vision.  Is currently requiring 2 L oxygen nasal cannula.  Some history of marijuana and other drug abuse as well.      Subjective   Interval History:  Patient awake and alert this morning having quite a bit of pain over his right arm.  X-rays this morning showed fractures.  Denies any chest pain, nausea, fever, or chills.             The patient's past medical,  surgical and social history were reviewed and updated in Epic as appropriate.    Objective   Objective     Infusions:  insulin, 0-100 Units/hr, Last Rate: Stopped (04/28/25 1151)  niCARdipine, 5-15 mg/hr, Last Rate: Stopped (04/28/25 1150)  sodium bicarbonate 8.4 % 150 mEq in dextrose (D5W) 5 % 1,000 mL infusion (greater than 100 mEq), 150 mEq      Medications:  diazePAM, 10 mg, Oral, Q6H  folic acid 1 mg in sodium chloride 0.9 % 50 mL IVPB, 1 mg, Intravenous, Daily  insulin glargine, 10 Units, Subcutaneous, Daily  insulin lispro, 2-9 Units, Subcutaneous, 4x Daily AC & at Bedtime  lisinopril, 20 mg, Oral, Q24H  mupirocin, 1 Application, Each Nare, BID  senna-docusate sodium, 2 tablet, Oral, BID  sodium chloride, 10 mL, Intravenous, Q12H  sodium chloride, 10 mL, Intravenous, Q12H      I reviewed the patient's medications.    Vital Sign Min/Max for last 24 hours  Temp  Min: 97.5 °F (36.4 °C)  Max: 98.6 °F (37 °C)   BP  Min: 122/83  Max: 253/139   Pulse  Min: 71  Max: 158   Resp  Min: 16  Max: 28   SpO2  Min: 85 %  Max: 100 %   Flow (L/min) (Oxygen Therapy)  Min: 2  Max: 2       Input/Output for last 24 hour shift  04/27 0701 - 04/28 0700  In: 5289 [I.V.:3289]  Out: 1300 [Urine:1300]      GENERAL : NAD, conversant  RESPIRATORY/THORAX : normal respiratory effort and no intercostal retractions, Coarse crackles bilaterally  CARDIOVASCULAR : Normal S1/S2, RRR. 1+ lower ext edema.  GASTROINTESTINAL : Soft, NT/ND. BS x 4 normoactive. No hepatosplenomegaly.  MUSCULOSKELETAL : No cyanosis, clubbing, or ischemia  NEUROLOGICAL: alert and oriented to person, place and time  PSYCHOLOGICAL : Appropriate affect    Results from last 7 days   Lab Units 04/28/25  0428 04/27/25  1633 04/27/25  1230   WBC 10*3/mm3 20.20* 26.30* 18.63*   HEMOGLOBIN g/dL 18.0* 18.4* 20.4*   PLATELETS 10*3/mm3 196 223 235     Results from last 7 days   Lab Units 04/28/25  0850 04/28/25  0428 04/28/25  0122 04/27/25  1633 04/27/25  1229   SODIUM mmol/L 132*  125* 129*   < > 125*   POTASSIUM mmol/L 4.1 4.1 3.5   < > 3.9   CO2 mmol/L 17.0* 11.0* 15.0*   < > 17.0*   BUN mg/dL 11 11 11   < > 9   CREATININE mg/dL 1.47* 1.43* 1.50*   < > 1.16   MAGNESIUM mg/dL  --  3.2*  --   --  1.5*   PHOSPHORUS mg/dL  --   --   --   --  1.8*   GLUCOSE mg/dL 171* 169* 92   < > 240*    < > = values in this interval not displayed.     Estimated Creatinine Clearance: 71.7 mL/min (A) (by C-G formula based on SCr of 1.47 mg/dL (H)).          I reviewed the patient's new clinical results.  I reviewed the patient's new imaging results/reports including actual images and agree with reports.       Imaging Results (Last 24 Hours)       Procedure Component Value Units Date/Time    CT Upper Extremity Right Without Contrast [[id number]] Resulted: 04/28/25 1159     Updated: 04/28/25 1159    MRI Brain With & Without Contrast [[id number]] Collected: 04/28/25 0129     Updated: 04/28/25 0135    Narrative:        MRI BRAIN W WO CONTRAST    Date of Exam: 4/27/2025 11:14 PM EDT    Indication: seizure.     Comparison: CT head 4/27/2025.    Technique:  Routine multiplanar/multisequence sequence images of the brain were obtained before and after the uneventful administration of 20 mL Multihance.      Findings:  There is no diffusion restriction to suggest acute infarct. There is no evidence of acute or chronic intracranial hemorrhage. There is a small focus of encephalomalacia and hemosiderin staining in the inferior left cerebellum, which likely corresponds to   a chronic lacunar infarct. No mass effect or midline shift. No abnormal extra-axial collections.  The major vascular flow voids appear intact. The basal ganglia, brainstem and cerebellum appear within normal limits.  Calvarial and superficial soft   tissue signal is within normal limits.  Orbits appear unremarkable.  The paranasal sinuses and the mastoid air cells appear well aerated.  Midline structures are intact. No abnormal enhancement. No evidence of  gray matter heterotopia. The hippocampus   appears normal bilaterally. No evidence of mesial temporal sclerosis. No obvious cortical dysplasia or migrational abnormality.      Impression:      Impression:  1.No acute intracranial abnormality. No epileptogenic focus identified.  2.Small chronic lacunar infarct in the inferior left cerebellum.            Electronically Signed: Conrad Bruce MD    4/28/2025 1:32 AM EDT    Workstation ID: [id number]    XR Shoulder 2+ View Right [[id number]] Collected: 04/27/25 1843     Updated: 04/27/25 1847    Narrative:      XR SHOULDER 2+ VW RIGHT    Date of Exam: 4/27/2025 6:00 PM EDT    Indication: Shoulder pain    Comparison: 4/27/2025    Findings:  There is an acute comminuted fracture of the proximal humerus. The humeral head appears to be dislocated somewhat posteriorly. AC joint appears within normal limits. Visualized portion of the right ribs are within normal limits      Impression:      Impression:  Acute comminuted displaced fracture of the proximal humerus.      Electronically Signed: Tyler Majano MD    4/27/2025 6:44 PM EDT    Workstation ID: [id number]    CT Head Without Contrast [[id number]] Collected: 04/27/25 1454     Updated: 04/27/25 1459    Narrative:      CT HEAD WO CONTRAST    Date of Exam: 4/27/2025 2:17 PM EDT    Indication: seizure.    Comparison: None available.    Technique: Axial CT images were obtained of the head without contrast administration.  Automated exposure control and iterative construction methods were used.      Findings:  There is no evidence of acute intracranial hemorrhage, mass, midline shift, loss of gray-white matter differentiation, or extra-axial fluid collection.  There is normal ventricular volume. The basal cisterns are patent.     No evidence of fracture.  The paranasal sinuses and mastoid air cells are predominantly well aerated.    The orbits and included soft tissues show no acute abnormality.         Impression:       Impression:  No acute intracranial abnormality.        Electronically Signed: Conrad Nicole MD    4/27/2025 2:56 PM EDT    Workstation ID: [id number]    XR Chest 1 View [[id number]] Collected: 04/27/25 1341     Updated: 04/27/25 1345    Narrative:      XR CHEST 1 VW    Date of Exam: 4/27/2025 1:15 PM EDT    Indication: Tachypnea    Comparison: None available.    Findings:  The cardiomediastinal silhouette is within normal limits. Pulmonary vascularity appears normal. There is no focal airspace consolidation, pleural effusion, or pneumothorax. There are mild degenerative changes of the thoracic spine.      Impression:      Impression:  No acute cardiopulmonary abnormality.        Electronically Signed: Sean Graf MD    4/27/2025 1:42 PM EDT    Workstation ID: [id number]            Assessment & Plan   Impression        Seizure    Essential hypertension    Mixed hyperlipidemia    Alcohol withdrawal    Closed fracture of proximal end of right humerus    Stage 3a chronic kidney disease    Type 2 diabetes mellitus       Plan        55-year-old male with history of diabetes type 2, hypertension, CKD stage III, and alcohol abuse.  He presented to [facility] ICU status post seizure notable to have a right humerus fracture in addition to DKA on arrival.    -Continue nicardipine drip for blood pressure control and will start lisinopril 20 mg daily  - Will go ahead and transition insulin drip to subcu insulin, goal blood glucose less than 180  - Start p.o. diet  - Ortho has been consulted for right humerus fracture  - Start Valium 10 mg every 6 hours p.o. for concerns for alcohol withdrawal, continue CIWA protocol  - Statin for hyperlipidemia  - AM labs    I conducted multidisciplinary rounds and the plan of care was discussed with the multidisciplinary team at that time. In attendance at multidisciplinary rounds was clinical pharmacist, dietitian, nursing staff, and case management.    I discussed the patient's  findings and my recommendations with patient and nursing staff     Critical Care time spent in direct patient care: 35 minutes (excluding procedure time, if applicable) including high complexity decision making to assess, manipulate, and support vital organ system failure in this individual who has impairment of one or more vital organ systems such that there is a high probability of imminent or life threatening deterioration in the patient's condition.      Marilyn Alvares DO  Pulmonary, Critical care and Sleep Medicine         Electronically signed by Juan C Alvares DO at 04/28/25 1223          Consult Notes (all)        Sushil Thornton MD at 04/28/25 8579        Consult Orders    1. Inpatient Nephrology Consult [[id number]] ordered by Tre Shaikh MD at 04/27/25 1553                   Referring Provider: Tre Shaikh MD   Reason for Consultation: DAKSHA     Subjective     Chief complaint Nausea, SOA and High BP    History of present illness:  This is a 55-year-old man with past medical hx of HTN, HLD, prediabetes and CKD  presented to the ER with nausea, rapid breathing, chills, and elevated blood pressure, along with a month-long loss of appetite. He had stopped his medications after losing his job and had a history of borderline diabetes. In the ER, he had a tonic seizure, was treated with benzodiazepine, and was found to be in acute diabetic ketoacidosis (DKA). The neurologist recommended scheduled Ativan instead of Keppra and a stat EEG. The patient was sleepy but responsive to commands and was receiving 2L oxygen via nasal cannula. He also has a history of drug abuse. Denies any fever, chills, rigors, rash, hematuria or hemoptysis. No family hx of kidney disease or autoimmune disease. Denies any hx of kidney stones. Has been taking Aleve for many years 3 or times a week. Nephrology service has been consulted for DAKSHA/CKD management       History  Past Medical History:   Diagnosis Date     Hyperlipidemia     Hypertension     Kidney disease    ,   Past Surgical History:   Procedure Laterality Date    COLONOSCOPY      KNEE ACL RECONSTRUCTION     ,   Family History   Problem Relation Age of Onset    Diabetes Mother     Hypertension Mother     Heart attack Father     Heart attack Paternal Grandfather    ,   Social History     Socioeconomic History    Marital status: Single   Tobacco Use    Smoking status: Every Day     Current packs/day: 0.50     Average packs/day: 0.5 packs/day for 30.0 years (15.0 ttl pk-yrs)     Types: Cigarettes    Smokeless tobacco: Never   Vaping Use    Vaping status: Never Used   Substance and Sexual Activity    Alcohol use: Yes     Alcohol/week: 6.0 standard drinks of alcohol     Types: 6 Cans of beer per week    Drug use: Yes     Types: Marijuana     E-cigarette/Vaping    E-cigarette/Vaping Use Never User     Passive Exposure No     Counseling Given No      E-cigarette/Vaping Substances    Nicotine No     THC Yes     CBD No     Flavoring No      E-cigarette/Vaping Devices    Disposable No     Pre-filled or Refillable Cartridge No     Refillable Tank No     Pre-filled Pod No          ,   Medications Prior to Admission   Medication Sig Dispense Refill Last Dose/Taking    aspirin 81 MG EC tablet Take 1 tablet by mouth Daily. 90 tablet 5     atorvastatin (LIPITOR) 20 MG tablet Take 1 tablet by mouth Daily. 90 tablet 0     lisinopril (PRINIVIL,ZESTRIL) 30 MG tablet TAKE 1 TABLET BY MOUTH DAILY 90 tablet 0     LORazepam (Ativan) 0.5 MG tablet Take 1 tablet by mouth Every 8 (Eight) Hours As Needed for Anxiety. 14 tablet 0     omeprazole (priLOSEC) 40 MG capsule TAKE 1 CAPSULE BY MOUTH DAILY 30 capsule 2     ondansetron (ZOFRAN) 4 MG tablet Take 1 tablet by mouth Every 8 (Eight) Hours As Needed for Nausea or Vomiting.       sildenafil (Viagra) 25 MG tablet Take 1 tablet by mouth Daily As Needed for Erectile Dysfunction. 30 tablet 1    , Scheduled Meds:  folic acid 1 mg in sodium chloride  0.9 % 50 mL IVPB, 1 mg, Intravenous, Daily  LORazepam, 1 mg, Intravenous, Q8H  mupirocin, 1 Application, Each Nare, BID  senna-docusate sodium, 2 tablet, Oral, BID  sodium chloride, 10 mL, Intravenous, Q12H  sodium chloride, 10 mL, Intravenous, Q12H  thiamine (B-1) IV, 500 mg, Intravenous, Q8H   , Continuous Infusions:  dextrose 5 % and sodium chloride 0.45 %, 125 mL/hr  dextrose 5 % and sodium chloride 0.45 % with KCl 20 mEq/L, 125 mL/hr  dextrose 5 % and sodium chloride 0.45 % with KCl 40 mEq/L, 125 mL/hr  dextrose 5 % and sodium chloride 0.9 %, 125 mL/hr  dextrose 5 % and sodium chloride 0.9 % with KCl 20 mEq, 125 mL/hr, Last Rate: 125 mL/hr (04/28/25 0540)  dextrose 5% and sodium chloride 0.9% with KCl 40 mEq/L, 125 mL/hr  insulin, 0-100 Units/hr, Last Rate: 5.7 Units/hr (04/28/25 0801)  niCARdipine, 5-15 mg/hr, Last Rate: 5 mg/hr (04/28/25 0801)  sodium chloride 0.45 % 1,000 mL with potassium chloride 40 mEq infusion, 200 mL/hr  sodium chloride, 200 mL/hr  sodium chloride 0.45 % with KCl 20 mEq, 200 mL/hr  sodium chloride, 200 mL/hr  sodium chloride 0.9 % with KCl 20 mEq, 200 mL/hr, Last Rate: 200 mL/hr (04/27/25 1744)  sodium chloride 0.9 % with KCl 40 mEq/L, 200 mL/hr   , PRN Meds:    senna-docusate sodium **AND** polyethylene glycol **AND** bisacodyl **AND** bisacodyl    Calcium Replacement - Follow Nurse / BPA Driven Protocol    dextrose    dextrose    dextrose 5 % and sodium chloride 0.45 %    dextrose 5 % and sodium chloride 0.45 % with KCl 20 mEq/L    dextrose 5 % and sodium chloride 0.45 % with KCl 40 mEq/L    dextrose 5 % and sodium chloride 0.9 %    dextrose 5 % and sodium chloride 0.9 % with KCl 20 mEq    dextrose 5% and sodium chloride 0.9% with KCl 40 mEq/L    glucagon (human recombinant)    LORazepam    Magnesium Standard Dose Replacement - Follow Nurse / BPA Driven Protocol    nitroglycerin    Phosphorus Replacement - Follow Nurse / BPA Driven Protocol    Potassium Replacement - Follow Nurse /  BPA Driven Protocol    Sodium Chloride (PF)    sodium chloride 0.45 % 1,000 mL with potassium chloride 40 mEq infusion    sodium chloride    sodium chloride 0.45 % with KCl 20 mEq    sodium chloride    sodium chloride    sodium chloride    sodium chloride    sodium chloride    sodium chloride 0.9 % with KCl 20 mEq    sodium chloride 0.9 % with KCl 40 mEq/L, and Allergies:  Patient has no known allergies.    Review of Systems  Pertinent items are noted in HPI    Objective     Vital Signs  Temp:  [97.5 °F (36.4 °C)-98.6 °F (37 °C)] 97.5 °F (36.4 °C)  Heart Rate:  [] 96  Resp:  [16-28] 16  BP: (122-253)/() 136/79    I/O this shift:  In: -   Out: 600 [Urine:600]  I/O last 3 completed shifts:  In: 5289 [I.V.:3289; IV Piggyback:2000]  Out: 1300 [Urine:1300]    Physical Exam:  General Appearance:    Alert, cooperative, in no acute distress   Head:    Normocephalic, without obvious abnormality, atraumatic   Eyes:            Conjunctivae and sclerae normal, no   icterus, no pallor, corneas clear, PERRLA           Neck:   Supple, trachea midline, no thyromegaly,  no JVD       Lungs:     Clear to auscultation,respirations regular, even and               unlabored    Heart:    Regular rhythm and normal rate, normal S1 and S2, no       murmur, no gallop, no rub, no click       Abdomen:     Normal bowel sounds,soft, non-tender, non-distended, no guarding, no rebound tenderness       Extremities:   Moves all extremities well, no edema, no cyanosis, no         redness   Pulses:   Pulses palpable and equal bilaterally           Neurologic:   Cranial nerves 2 - 12 grossly intact, no focal deficit         Results Review:   I reviewed the patient's new clinical results.    WBC WBC   Date Value Ref Range Status   04/28/2025 20.20 (H) 3.40 - 10.80 10*3/mm3 Final   04/27/2025 26.30 (H) 3.40 - 10.80 10*3/mm3 Final   04/27/2025 18.63 (H) 3.40 - 10.80 10*3/mm3 Final     Comment:     Modified report. Previous result was 18.82  "10*3/mm3 on 4/27/2025 at 1258 EDT.      HGB Hemoglobin   Date Value Ref Range Status   04/28/2025 18.0 (H) 13.0 - 17.7 g/dL Final   04/27/2025 18.4 (H) 13.0 - 17.7 g/dL Final   04/27/2025 20.4 (H) 13.0 - 17.7 g/dL Final     Comment:     Modified report. Previous result was 20.0 g/dL on 4/27/2025 at 1258 EDT.      HCT Hematocrit   Date Value Ref Range Status   04/28/2025 55.0 (H) 37.5 - 51.0 % Final   04/27/2025 51.5 (H) 37.5 - 51.0 % Final   04/27/2025 56.8 (H) 37.5 - 51.0 % Final     Comment:     Modified report. Previous result was 56.9 % on 4/27/2025 at 1258 EDT.      Platlets No results found for: \"LABPLAT\"   MCV MCV   Date Value Ref Range Status   04/28/2025 99.8 (H) 79.0 - 97.0 fL Final   04/27/2025 91.6 79.0 - 97.0 fL Final   04/27/2025 91.0 79.0 - 97.0 fL Final          Sodium Sodium   Date Value Ref Range Status   04/28/2025 125 (L) 136 - 145 mmol/L Final   04/28/2025 129 (L) 136 - 145 mmol/L Final   04/27/2025 127 (L) 136 - 145 mmol/L Final   04/27/2025 126 (L) 136 - 145 mmol/L Final   04/27/2025 125 (L) 136 - 145 mmol/L Final      Potassium Potassium   Date Value Ref Range Status   04/28/2025 4.1 3.5 - 5.2 mmol/L Final     Comment:     Slight hemolysis detected by analyzer. Result may be falsely elevated.   04/28/2025 3.5 3.5 - 5.2 mmol/L Final     Comment:     Slight hemolysis detected by analyzer. Result may be falsely elevated.   04/27/2025 3.9 3.5 - 5.2 mmol/L Final     Comment:     Slight hemolysis detected by analyzer. Result may be falsely elevated.   04/27/2025 3.9 3.5 - 5.2 mmol/L Final     Comment:     Slight hemolysis detected by analyzer. Result may be falsely elevated.   04/27/2025 3.9 3.5 - 5.2 mmol/L Final     Comment:     Slight hemolysis detected by analyzer. Result may be falsely elevated.      Chloride Chloride   Date Value Ref Range Status   04/28/2025 101 98 - 107 mmol/L Final   04/28/2025 101 98 - 107 mmol/L Final   04/27/2025 98 98 - 107 mmol/L Final   04/27/2025 94 (L) 98 - 107 " "mmol/L Final   04/27/2025 89 (L) 98 - 107 mmol/L Final      CO2 CO2   Date Value Ref Range Status   04/28/2025 11.0 (L) 22.0 - 29.0 mmol/L Final   04/28/2025 15.0 (L) 22.0 - 29.0 mmol/L Final   04/27/2025 16.0 (L) 22.0 - 29.0 mmol/L Final   04/27/2025 15.0 (L) 22.0 - 29.0 mmol/L Final   04/27/2025 17.0 (L) 22.0 - 29.0 mmol/L Final      BUN BUN   Date Value Ref Range Status   04/28/2025 11 6 - 20 mg/dL Final   04/28/2025 11 6 - 20 mg/dL Final   04/27/2025 10 6 - 20 mg/dL Final   04/27/2025 10 6 - 20 mg/dL Final   04/27/2025 9 6 - 20 mg/dL Final      Creatinine Creatinine   Date Value Ref Range Status   04/28/2025 1.43 (H) 0.76 - 1.27 mg/dL Final   04/28/2025 1.50 (H) 0.76 - 1.27 mg/dL Final   04/27/2025 1.30 (H) 0.76 - 1.27 mg/dL Final   04/27/2025 1.51 (H) 0.76 - 1.27 mg/dL Final   04/27/2025 1.16 0.76 - 1.27 mg/dL Final      Calcium Calcium   Date Value Ref Range Status   04/28/2025 7.7 (L) 8.6 - 10.5 mg/dL Final   04/28/2025 7.9 (L) 8.6 - 10.5 mg/dL Final   04/27/2025 7.7 (L) 8.6 - 10.5 mg/dL Final   04/27/2025 8.1 (L) 8.6 - 10.5 mg/dL Final   04/27/2025 9.1 8.6 - 10.5 mg/dL Final      PO4 No results found for: \"CAPO4\"   Albumin Albumin   Date Value Ref Range Status   04/27/2025 4.7 3.5 - 5.2 g/dL Final      Magnesium Magnesium   Date Value Ref Range Status   04/28/2025 3.2 (H) 1.6 - 2.6 mg/dL Final   04/27/2025 1.5 (L) 1.6 - 2.6 mg/dL Final      Uric Acid No results found for: \"URICACID\"     Results from last 7 days   Lab Units 04/28/25  0428 04/28/25  0122 04/27/25  2107 04/27/25  1633 04/27/25  1230 04/27/25  1229   SODIUM mmol/L 125* 129* 127* 126*  --  125*   POTASSIUM mmol/L 4.1 3.5 3.9 3.9  --  3.9   CHLORIDE mmol/L 101 101 98 94*  --  89*   CO2 mmol/L 11.0* 15.0* 16.0* 15.0*  --  17.0*   BUN mg/dL 11 11 10 10  --  9   CREATININE mg/dL 1.43* 1.50* 1.30* 1.51*  --  1.16   CALCIUM mg/dL 7.7* 7.9* 7.7* 8.1*  --  9.1   ALBUMIN g/dL  --   --   --   --   --  4.7   WBC 10*3/mm3 20.20*  --   --  26.30* 18.63*  --  "   HEMOGLOBIN g/dL 18.0*  --   --  18.4* 20.4*  --    PLATELETS 10*3/mm3 196  --   --  223 235  --    GLUCOSE mg/dL 169* 92 178* 309*  --  240*   PHOSPHORUS mg/dL  --   --   --   --   --  1.8*             folic acid 1 mg in sodium chloride 0.9 % 50 mL IVPB, 1 mg, Intravenous, Daily  LORazepam, 1 mg, Intravenous, Q8H  mupirocin, 1 Application, Each Nare, BID  potassium chloride, 10 mEq, Intravenous, Q1H  senna-docusate sodium, 2 tablet, Oral, BID  sodium chloride, 10 mL, Intravenous, Q12H  sodium chloride, 10 mL, Intravenous, Q12H  thiamine (B-1) IV, 500 mg, Intravenous, Q8H      dextrose 5 % and sodium chloride 0.45 %, 125 mL/hr  dextrose 5 % and sodium chloride 0.45 % with KCl 20 mEq/L, 125 mL/hr  dextrose 5 % and sodium chloride 0.45 % with KCl 40 mEq/L, 125 mL/hr  dextrose 5 % and sodium chloride 0.9 %, 125 mL/hr  dextrose 5 % and sodium chloride 0.9 % with KCl 20 mEq, 125 mL/hr, Last Rate: 125 mL/hr (04/28/25 0540)  dextrose 5% and sodium chloride 0.9% with KCl 40 mEq/L, 125 mL/hr  insulin, 0-100 Units/hr, Last Rate: 5.7 Units/hr (04/28/25 0801)  niCARdipine, 5-15 mg/hr, Last Rate: 5 mg/hr (04/28/25 0801)  sodium chloride 0.45 % 1,000 mL with potassium chloride 40 mEq infusion, 200 mL/hr  sodium chloride, 200 mL/hr  sodium chloride 0.45 % with KCl 20 mEq, 200 mL/hr  sodium chloride, 200 mL/hr  sodium chloride 0.9 % with KCl 20 mEq, 200 mL/hr, Last Rate: 200 mL/hr (04/27/25 1744)  sodium chloride 0.9 % with KCl 40 mEq/L, 200 mL/hr        Assessment & Plan       Seizure      1- DAKSHA on CKD - Pre-renal azotemia vs DAKSHA secondary to Hypertensive crisis. UA + protein, glucose and RBC 3-5.   2- CKD - baseline Scr 1.1-1.3 range.   3- Metabolic acidosis   4- Hyponatremia - corrected for hyperglycemia -127  5- Hypophosphatemia   6- HTN encephalopathy     Plan  - Bicarb drip   - Monitor I/O   - Avoid nephrotoxic agents.   - Renal diet   - Adjust meds per renal function   - No emergent need of RRT   - Monitor H/H and  transfuse for Hgb less than 7.0   - Urine lytes   - Renal ultrasound   - Replete phosphorus and calcium per protocol.     I discussed the patients findings and my recommendations with patient and nursing staff    Sushil Thornton MD  04/28/25            Electronically signed by Sushil Thornton MD at 04/28/25 1201       Sanchez Schneider MD at 04/27/25 1623        Consult Orders    1. Inpatient Neurology Consult General [[id number]] ordered by Ramon Guidry APRN at 04/27/25 1508                 Neurology Note    Patient:  Juan C Castro    YOB: 1970    REFERRING PHYSICIAN:  Dr. Shaikh    CHIEF COMPLAINT:    Seizure    HISTORY OF PRESENT ILLNESS:   The patient is a 55 y.o. male with h/o HTN, DM, presented to ED today c/o nausea, SOA, chills, elevated BP. He has not been taking his meds for some time. He drinks alcohol daily and takes Ativan prn that he buys on the street. In ED initial /145, HR 93, T97.7, O2 99%, CT head negative. He had a witnessed tonic seizure in ED a/w teeth being clenched and unresponsiveness lasting 2 minutes, received Ativan 2 mg IV. Started on Cardene drip. Noted to be ketotic, , CO2 17, anion gap 19, started on insulin drip, admitted to the ICU. Currently drowsy but o/w close to baseline. He denies prior seizures. Last drink last night per his wife, drinks beer. He did not sleep last night.    Past Medical History:  Past Medical History:   Diagnosis Date    Hyperlipidemia     Hypertension     Kidney disease        Past Surgical History:  Past Surgical History:   Procedure Laterality Date    KNEE ACL RECONSTRUCTION         Social History:   Social History     Socioeconomic History    Marital status: Single   Tobacco Use    Smoking status: Every Day     Current packs/day: 0.50     Average packs/day: 0.5 packs/day for 30.0 years (15.0 ttl pk-yrs)     Types: Cigarettes    Smokeless tobacco: Never   Substance and Sexual Activity    Alcohol use:  Yes        Family History:   Family History   Problem Relation Age of Onset    Diabetes Mother     Hypertension Mother     Heart attack Father     Heart attack Paternal Grandfather        Medications Prior to Admission:    Prior to Admission medications    Medication Sig Start Date End Date Taking? Authorizing Provider   aspirin 81 MG EC tablet Take 1 tablet by mouth Daily. 6/23/20   Elin Tolentino APRN   atorvastatin (LIPITOR) 20 MG tablet Take 1 tablet by mouth Daily. 7/8/22   Micaela Peter MD   lisinopril (PRINIVIL,ZESTRIL) 30 MG tablet TAKE 1 TABLET BY MOUTH DAILY 10/3/22   Micaela Peter MD   LORazepam (Ativan) 0.5 MG tablet Take 1 tablet by mouth Every 8 (Eight) Hours As Needed for Anxiety. 1/21/22   Micaela Peter MD   omeprazole (priLOSEC) 40 MG capsule TAKE 1 CAPSULE BY MOUTH DAILY 8/15/22   Micaela Peter MD   ondansetron (ZOFRAN) 4 MG tablet Take 1 tablet by mouth Every 8 (Eight) Hours As Needed for Nausea or Vomiting.    Provider, MD Migue   sildenafil (Viagra) 25 MG tablet Take 1 tablet by mouth Daily As Needed for Erectile Dysfunction. 2/21/22   Micaela Peter MD       Allergies:  Patient has no known allergies.      Review of system  Review of Systems   Constitutional:  Positive for chills and fatigue.   Neurological:  Negative for headaches.   All other systems reviewed and are negative.      Vitals:    04/27/25 1619   BP:    Pulse:    Resp: 28   Temp:    SpO2:        Physical exam  Physical Exam  Eyes:      Extraocular Movements: Extraocular movements intact.      Pupils: Pupils are equal, round, and reactive to light.   Cardiovascular:      Rate and Rhythm: Normal rate.   Pulmonary:      Effort: Pulmonary effort is normal.   Musculoskeletal:      Cervical back: Neck supple.   Neurological:      General: No focal deficit present.      Mental Status: He is alert and oriented to person, place, and time.      Deep Tendon Reflexes: Babinski sign absent on the right  side. Babinski sign absent on the left side.      Comments: Speech clear, VFF, no facial droop, no limb drift.           Lab Results   Component Value Date    WBC 18.63 (H) 04/27/2025    HGB 20.4 (H) 04/27/2025    HCT 56.8 (H) 04/27/2025    MCV 91.0 04/27/2025     04/27/2025     Lab Results   Component Value Date    GLUCOSE 240 (H) 04/27/2025    BUN 9 04/27/2025    CREATININE 1.16 04/27/2025    EGFRIFNONA 61 07/02/2021    BCR 7.8 04/27/2025    CO2 17.0 (L) 04/27/2025    CALCIUM 9.1 04/27/2025    ALBUMIN 4.7 04/27/2025    AST 28 04/27/2025    ALT 34 04/27/2025         Component  Ref Range & Units (hover) 12:29   Beta-Hydroxybutyrate Quant 0.668 High      ntains critical data STAT Lactic Acid, Reflex  Order: [id number] - Reflex for Order [id number]   Status: Final result       Next appt: None    Test Result Released: No (scheduled for 4/27/2025  5:21 PM)    Specimen Information: Blood   0 Result Notes       Component  Ref Range & Units (hover) 15:49 12:29   Lactate 10.1 High Critical  3.0 High Critical  CM          Radiological Studies:   CT Head Without Contrast  Result Date: 4/27/2025  CT HEAD WO CONTRAST Date of Exam: 4/27/2025 2:17 PM EDT Indication: seizure. Comparison: None available. Technique: Axial CT images were obtained of the head without contrast administration.  Automated exposure control and iterative construction methods were used. Findings: There is no evidence of acute intracranial hemorrhage, mass, midline shift, loss of gray-white matter differentiation, or extra-axial fluid collection.  There is normal ventricular volume. The basal cisterns are patent. No evidence of fracture.  The paranasal sinuses and mastoid air cells are predominantly well aerated. The orbits and included soft tissues show no acute abnormality.      Impression: No acute intracranial abnormality. Electronically Signed: Conrad Nicole MD  4/27/2025 2:56 PM EDT  Workstation ID: [id number]    XR Chest 1 View  Result Date:  4/27/2025  XR CHEST 1 VW Date of Exam: 4/27/2025 1:15 PM EDT Indication: Tachypnea Comparison: None available. Findings: The cardiomediastinal silhouette is within normal limits. Pulmonary vascularity appears normal. There is no focal airspace consolidation, pleural effusion, or pneumothorax. There are mild degenerative changes of the thoracic spine.     Impression: No acute cardiopulmonary abnormality. Electronically Signed: Sean Graf MD  4/27/2025 1:42 PM EDT  Workstation ID: [id number]        During this visit the following were done:  Labs Reviewed [x]    Labs Ordered []    Radiology Reports Reviewed [x]    Radiology Ordered []    EKG, echo, and/or stress test reviewed []    EEG results reviewed  []    EEG reviewed and interpreted per myself   []    Discussed case with neurointerventionalist or neuroradiologist []    Referring Provider Records Reviewed []    ER Records Reviewed []    Hospital Records Reviewed []    History Obtained From Family []    Radiological images view and Interpreted per myself [x]    Case Discussed with referring provider [x]     Decision to obtain and request outside records  []        Assessment and Plan     New-onset generalized tonic seizure in the setting of severe HTN, suspected DKA, sleep deprivation, daily alcohol and street Ativan use. Considerations include early withdrawal, PRES, subacute stroke. Currently back to baseline.   - ICU observation.   - Ativan 1 mg q8.   - Keppra loading dose if he was to have another seizure.   - Thiamine.   - CIWA.   - UDS.   - MRI nrain w/wo.   - EEG.    Thanks.              Electronically signed by Sanchez Schneider MD on 4/27/2025 at 16:23 EDT        Electronically signed by Sanchez Schneider MD at 04/28/25 0771

## 2025-04-28 NOTE — CASE MANAGEMENT/SOCIAL WORK
Discharge Planning Assessment  [facility]     Patient Name: Juan C Castro  MRN: [id number]  Today's Date: 4/28/2025    Admit Date: 4/27/2025    Plan: Home with Family   Discharge Needs Assessment       Row Name 04/28/25 1744       Living Environment    People in Home parent(s)    Name(s) of People in Home Jocelyne Castro- mother    Current Living Arrangements home    Primary Care Provided by self    Provides Primary Care For no one    Family Caregiver if Needed parent(s);significant other    Family Caregiver Names Jocelyne Castro- mother, Albert Dennison- wife    Quality of Family Relationships helpful;involved;supportive    Able to Return to Prior Arrangements yes       Transition Planning    Patient/Family Anticipates Transition to home with family    Transportation Anticipated family or friend will provide       Discharge Needs Assessment    Readmission Within the Last 30 Days no previous admission in last 30 days    Equipment Currently Used at Home none    Concerns to be Addressed discharge planning    Current Discharge Risk physical impairment      Row Name 04/28/25 1743       Living Environment    People in Home parent(s)                   Discharge Plan       Row Name 04/28/25 1748       Plan    Plan Home with Family    Patient/Family in Agreement with Plan yes    Plan Comments I have met with Mr. Castro at the bedside today to initiate a discharge plan.  He states that he and his mother share a home in [facility].  He reports that he is independent with activities of daily living and mobility prior to this hospital admission.  He is employed full-time and is able to drive his car.  I have confirmed that his insurance is [employer].  He denies use of DME and current receipt of home health/OP services.  He anticipates no discharge needs and states that his family will provide assistance and transportation as needed.  CM will cont to follow plan of care and provide assistance with  discharge planning as recommendations become available.    Final Discharge Disposition Code 01 - home or self-care                       Demographic Summary       Row Name 04/28/25 1743       General Information    Admission Type inpatient    Arrived From home    Referral Source admission list    Reason for Consult discharge planning    Preferred Language English                   Functional Status       Row Name 04/28/25 1743       Functional Status, IADL    Medications independent    Meal Preparation independent    Housekeeping independent    Laundry independent    Shopping independent       Employment/    Employment Status employed full-time                          Hoda Mcpherson RN

## 2025-04-28 NOTE — CONSULTS
Referring Provider: Tre Shaikh MD   Reason for Consultation: DAKSHA     Subjective     Chief complaint Nausea, SOA and High BP    History of present illness:  This is a 55-year-old man with past medical hx of HTN, HLD, prediabetes and CKD  presented to the ER with nausea, rapid breathing, chills, and elevated blood pressure, along with a month-long loss of appetite. He had stopped his medications after losing his job and had a history of borderline diabetes. In the ER, he had a tonic seizure, was treated with benzodiazepine, and was found to be in acute diabetic ketoacidosis (DKA). The neurologist recommended scheduled Ativan instead of Keppra and a stat EEG. The patient was sleepy but responsive to commands and was receiving 2L oxygen via nasal cannula. He also has a history of drug abuse. Denies any fever, chills, rigors, rash, hematuria or hemoptysis. No family hx of kidney disease or autoimmune disease. Denies any hx of kidney stones. Has been taking Aleve for many years 3 or times a week. Nephrology service has been consulted for DAKSHA/CKD management       History  Past Medical History:   Diagnosis Date    Hyperlipidemia     Hypertension     Kidney disease    ,   Past Surgical History:   Procedure Laterality Date    COLONOSCOPY      KNEE ACL RECONSTRUCTION     ,   Family History   Problem Relation Age of Onset    Diabetes Mother     Hypertension Mother     Heart attack Father     Heart attack Paternal Grandfather    ,   Social History     Socioeconomic History    Marital status: Single   Tobacco Use    Smoking status: Every Day     Current packs/day: 0.50     Average packs/day: 0.5 packs/day for 30.0 years (15.0 ttl pk-yrs)     Types: Cigarettes    Smokeless tobacco: Never   Vaping Use    Vaping status: Never Used   Substance and Sexual Activity    Alcohol use: Yes     Alcohol/week: 6.0 standard drinks of alcohol     Types: 6 Cans of beer per week    Drug use: Yes     Types: Marijuana     E-cigarette/Vaping     E-cigarette/Vaping Use Never User     Passive Exposure No     Counseling Given No      E-cigarette/Vaping Substances    Nicotine No     THC Yes     CBD No     Flavoring No      E-cigarette/Vaping Devices    Disposable No     Pre-filled or Refillable Cartridge No     Refillable Tank No     Pre-filled Pod No          ,   Medications Prior to Admission   Medication Sig Dispense Refill Last Dose/Taking    aspirin 81 MG EC tablet Take 1 tablet by mouth Daily. 90 tablet 5     atorvastatin (LIPITOR) 20 MG tablet Take 1 tablet by mouth Daily. 90 tablet 0     lisinopril (PRINIVIL,ZESTRIL) 30 MG tablet TAKE 1 TABLET BY MOUTH DAILY 90 tablet 0     LORazepam (Ativan) 0.5 MG tablet Take 1 tablet by mouth Every 8 (Eight) Hours As Needed for Anxiety. 14 tablet 0     omeprazole (priLOSEC) 40 MG capsule TAKE 1 CAPSULE BY MOUTH DAILY 30 capsule 2     ondansetron (ZOFRAN) 4 MG tablet Take 1 tablet by mouth Every 8 (Eight) Hours As Needed for Nausea or Vomiting.       sildenafil (Viagra) 25 MG tablet Take 1 tablet by mouth Daily As Needed for Erectile Dysfunction. 30 tablet 1    , Scheduled Meds:  folic acid 1 mg in sodium chloride 0.9 % 50 mL IVPB, 1 mg, Intravenous, Daily  LORazepam, 1 mg, Intravenous, Q8H  mupirocin, 1 Application, Each Nare, BID  senna-docusate sodium, 2 tablet, Oral, BID  sodium chloride, 10 mL, Intravenous, Q12H  sodium chloride, 10 mL, Intravenous, Q12H  thiamine (B-1) IV, 500 mg, Intravenous, Q8H   , Continuous Infusions:  dextrose 5 % and sodium chloride 0.45 %, 125 mL/hr  dextrose 5 % and sodium chloride 0.45 % with KCl 20 mEq/L, 125 mL/hr  dextrose 5 % and sodium chloride 0.45 % with KCl 40 mEq/L, 125 mL/hr  dextrose 5 % and sodium chloride 0.9 %, 125 mL/hr  dextrose 5 % and sodium chloride 0.9 % with KCl 20 mEq, 125 mL/hr, Last Rate: 125 mL/hr (04/28/25 0540)  dextrose 5% and sodium chloride 0.9% with KCl 40 mEq/L, 125 mL/hr  insulin, 0-100 Units/hr, Last Rate: 5.7 Units/hr (04/28/25 0801)  niCARdipine,  5-15 mg/hr, Last Rate: 5 mg/hr (04/28/25 0801)  sodium chloride 0.45 % 1,000 mL with potassium chloride 40 mEq infusion, 200 mL/hr  sodium chloride, 200 mL/hr  sodium chloride 0.45 % with KCl 20 mEq, 200 mL/hr  sodium chloride, 200 mL/hr  sodium chloride 0.9 % with KCl 20 mEq, 200 mL/hr, Last Rate: 200 mL/hr (04/27/25 1744)  sodium chloride 0.9 % with KCl 40 mEq/L, 200 mL/hr   , PRN Meds:    senna-docusate sodium **AND** polyethylene glycol **AND** bisacodyl **AND** bisacodyl    Calcium Replacement - Follow Nurse / BPA Driven Protocol    dextrose    dextrose    dextrose 5 % and sodium chloride 0.45 %    dextrose 5 % and sodium chloride 0.45 % with KCl 20 mEq/L    dextrose 5 % and sodium chloride 0.45 % with KCl 40 mEq/L    dextrose 5 % and sodium chloride 0.9 %    dextrose 5 % and sodium chloride 0.9 % with KCl 20 mEq    dextrose 5% and sodium chloride 0.9% with KCl 40 mEq/L    glucagon (human recombinant)    LORazepam    Magnesium Standard Dose Replacement - Follow Nurse / BPA Driven Protocol    nitroglycerin    Phosphorus Replacement - Follow Nurse / BPA Driven Protocol    Potassium Replacement - Follow Nurse / BPA Driven Protocol    Sodium Chloride (PF)    sodium chloride 0.45 % 1,000 mL with potassium chloride 40 mEq infusion    sodium chloride    sodium chloride 0.45 % with KCl 20 mEq    sodium chloride    sodium chloride    sodium chloride    sodium chloride    sodium chloride    sodium chloride 0.9 % with KCl 20 mEq    sodium chloride 0.9 % with KCl 40 mEq/L, and Allergies:  Patient has no known allergies.    Review of Systems  Pertinent items are noted in HPI    Objective     Vital Signs  Temp:  [97.5 °F (36.4 °C)-98.6 °F (37 °C)] 97.5 °F (36.4 °C)  Heart Rate:  [] 96  Resp:  [16-28] 16  BP: (122-253)/() 136/79    I/O this shift:  In: -   Out: 600 [Urine:600]  I/O last 3 completed shifts:  In: 5289 [I.V.:3289; IV Piggyback:2000]  Out: 1300 [Urine:1300]    Physical Exam:  General Appearance:     "Alert, cooperative, in no acute distress   Head:    Normocephalic, without obvious abnormality, atraumatic   Eyes:            Conjunctivae and sclerae normal, no   icterus, no pallor, corneas clear, PERRLA           Neck:   Supple, trachea midline, no thyromegaly,  no JVD       Lungs:     Clear to auscultation,respirations regular, even and               unlabored    Heart:    Regular rhythm and normal rate, normal S1 and S2, no       murmur, no gallop, no rub, no click       Abdomen:     Normal bowel sounds,soft, non-tender, non-distended, no guarding, no rebound tenderness       Extremities:   Moves all extremities well, no edema, no cyanosis, no         redness   Pulses:   Pulses palpable and equal bilaterally           Neurologic:   Cranial nerves 2 - 12 grossly intact, no focal deficit         Results Review:   I reviewed the patient's new clinical results.    WBC WBC   Date Value Ref Range Status   04/28/2025 20.20 (H) 3.40 - 10.80 10*3/mm3 Final   04/27/2025 26.30 (H) 3.40 - 10.80 10*3/mm3 Final   04/27/2025 18.63 (H) 3.40 - 10.80 10*3/mm3 Final     Comment:     Modified report. Previous result was 18.82 10*3/mm3 on 4/27/2025 at 1258 EDT.      HGB Hemoglobin   Date Value Ref Range Status   04/28/2025 18.0 (H) 13.0 - 17.7 g/dL Final   04/27/2025 18.4 (H) 13.0 - 17.7 g/dL Final   04/27/2025 20.4 (H) 13.0 - 17.7 g/dL Final     Comment:     Modified report. Previous result was 20.0 g/dL on 4/27/2025 at 1258 EDT.      HCT Hematocrit   Date Value Ref Range Status   04/28/2025 55.0 (H) 37.5 - 51.0 % Final   04/27/2025 51.5 (H) 37.5 - 51.0 % Final   04/27/2025 56.8 (H) 37.5 - 51.0 % Final     Comment:     Modified report. Previous result was 56.9 % on 4/27/2025 at 1258 EDT.      Platlets No results found for: \"LABPLAT\"   MCV MCV   Date Value Ref Range Status   04/28/2025 99.8 (H) 79.0 - 97.0 fL Final   04/27/2025 91.6 79.0 - 97.0 fL Final   04/27/2025 91.0 79.0 - 97.0 fL Final          Sodium Sodium   Date Value Ref " Range Status   04/28/2025 125 (L) 136 - 145 mmol/L Final   04/28/2025 129 (L) 136 - 145 mmol/L Final   04/27/2025 127 (L) 136 - 145 mmol/L Final   04/27/2025 126 (L) 136 - 145 mmol/L Final   04/27/2025 125 (L) 136 - 145 mmol/L Final      Potassium Potassium   Date Value Ref Range Status   04/28/2025 4.1 3.5 - 5.2 mmol/L Final     Comment:     Slight hemolysis detected by analyzer. Result may be falsely elevated.   04/28/2025 3.5 3.5 - 5.2 mmol/L Final     Comment:     Slight hemolysis detected by analyzer. Result may be falsely elevated.   04/27/2025 3.9 3.5 - 5.2 mmol/L Final     Comment:     Slight hemolysis detected by analyzer. Result may be falsely elevated.   04/27/2025 3.9 3.5 - 5.2 mmol/L Final     Comment:     Slight hemolysis detected by analyzer. Result may be falsely elevated.   04/27/2025 3.9 3.5 - 5.2 mmol/L Final     Comment:     Slight hemolysis detected by analyzer. Result may be falsely elevated.      Chloride Chloride   Date Value Ref Range Status   04/28/2025 101 98 - 107 mmol/L Final   04/28/2025 101 98 - 107 mmol/L Final   04/27/2025 98 98 - 107 mmol/L Final   04/27/2025 94 (L) 98 - 107 mmol/L Final   04/27/2025 89 (L) 98 - 107 mmol/L Final      CO2 CO2   Date Value Ref Range Status   04/28/2025 11.0 (L) 22.0 - 29.0 mmol/L Final   04/28/2025 15.0 (L) 22.0 - 29.0 mmol/L Final   04/27/2025 16.0 (L) 22.0 - 29.0 mmol/L Final   04/27/2025 15.0 (L) 22.0 - 29.0 mmol/L Final   04/27/2025 17.0 (L) 22.0 - 29.0 mmol/L Final      BUN BUN   Date Value Ref Range Status   04/28/2025 11 6 - 20 mg/dL Final   04/28/2025 11 6 - 20 mg/dL Final   04/27/2025 10 6 - 20 mg/dL Final   04/27/2025 10 6 - 20 mg/dL Final   04/27/2025 9 6 - 20 mg/dL Final      Creatinine Creatinine   Date Value Ref Range Status   04/28/2025 1.43 (H) 0.76 - 1.27 mg/dL Final   04/28/2025 1.50 (H) 0.76 - 1.27 mg/dL Final   04/27/2025 1.30 (H) 0.76 - 1.27 mg/dL Final   04/27/2025 1.51 (H) 0.76 - 1.27 mg/dL Final   04/27/2025 1.16 0.76 - 1.27  "mg/dL Final      Calcium Calcium   Date Value Ref Range Status   04/28/2025 7.7 (L) 8.6 - 10.5 mg/dL Final   04/28/2025 7.9 (L) 8.6 - 10.5 mg/dL Final   04/27/2025 7.7 (L) 8.6 - 10.5 mg/dL Final   04/27/2025 8.1 (L) 8.6 - 10.5 mg/dL Final   04/27/2025 9.1 8.6 - 10.5 mg/dL Final      PO4 No results found for: \"CAPO4\"   Albumin Albumin   Date Value Ref Range Status   04/27/2025 4.7 3.5 - 5.2 g/dL Final      Magnesium Magnesium   Date Value Ref Range Status   04/28/2025 3.2 (H) 1.6 - 2.6 mg/dL Final   04/27/2025 1.5 (L) 1.6 - 2.6 mg/dL Final      Uric Acid No results found for: \"URICACID\"     Results from last 7 days   Lab Units 04/28/25  0428 04/28/25  0122 04/27/25  2107 04/27/25  1633 04/27/25  1230 04/27/25  1229   SODIUM mmol/L 125* 129* 127* 126*  --  125*   POTASSIUM mmol/L 4.1 3.5 3.9 3.9  --  3.9   CHLORIDE mmol/L 101 101 98 94*  --  89*   CO2 mmol/L 11.0* 15.0* 16.0* 15.0*  --  17.0*   BUN mg/dL 11 11 10 10  --  9   CREATININE mg/dL 1.43* 1.50* 1.30* 1.51*  --  1.16   CALCIUM mg/dL 7.7* 7.9* 7.7* 8.1*  --  9.1   ALBUMIN g/dL  --   --   --   --   --  4.7   WBC 10*3/mm3 20.20*  --   --  26.30* 18.63*  --    HEMOGLOBIN g/dL 18.0*  --   --  18.4* 20.4*  --    PLATELETS 10*3/mm3 196  --   --  223 235  --    GLUCOSE mg/dL 169* 92 178* 309*  --  240*   PHOSPHORUS mg/dL  --   --   --   --   --  1.8*             folic acid 1 mg in sodium chloride 0.9 % 50 mL IVPB, 1 mg, Intravenous, Daily  LORazepam, 1 mg, Intravenous, Q8H  mupirocin, 1 Application, Each Nare, BID  potassium chloride, 10 mEq, Intravenous, Q1H  senna-docusate sodium, 2 tablet, Oral, BID  sodium chloride, 10 mL, Intravenous, Q12H  sodium chloride, 10 mL, Intravenous, Q12H  thiamine (B-1) IV, 500 mg, Intravenous, Q8H      dextrose 5 % and sodium chloride 0.45 %, 125 mL/hr  dextrose 5 % and sodium chloride 0.45 % with KCl 20 mEq/L, 125 mL/hr  dextrose 5 % and sodium chloride 0.45 % with KCl 40 mEq/L, 125 mL/hr  dextrose 5 % and sodium chloride 0.9 %, 125 " mL/hr  dextrose 5 % and sodium chloride 0.9 % with KCl 20 mEq, 125 mL/hr, Last Rate: 125 mL/hr (04/28/25 0540)  dextrose 5% and sodium chloride 0.9% with KCl 40 mEq/L, 125 mL/hr  insulin, 0-100 Units/hr, Last Rate: 5.7 Units/hr (04/28/25 0801)  niCARdipine, 5-15 mg/hr, Last Rate: 5 mg/hr (04/28/25 0801)  sodium chloride 0.45 % 1,000 mL with potassium chloride 40 mEq infusion, 200 mL/hr  sodium chloride, 200 mL/hr  sodium chloride 0.45 % with KCl 20 mEq, 200 mL/hr  sodium chloride, 200 mL/hr  sodium chloride 0.9 % with KCl 20 mEq, 200 mL/hr, Last Rate: 200 mL/hr (04/27/25 1744)  sodium chloride 0.9 % with KCl 40 mEq/L, 200 mL/hr        Assessment & Plan       Seizure      1- DAKSHA on CKD - Pre-renal azotemia vs DAKSHA secondary to Hypertensive crisis. UA + protein, glucose and RBC 3-5.   2- CKD - baseline Scr 1.1-1.3 range.   3- Metabolic acidosis   4- Hyponatremia - corrected for hyperglycemia -127  5- Hypophosphatemia   6- HTN encephalopathy     Plan  - Bicarb drip   - Monitor I/O   - Avoid nephrotoxic agents.   - Renal diet   - Adjust meds per renal function   - No emergent need of RRT   - Monitor H/H and transfuse for Hgb less than 7.0   - Urine lytes   - Renal ultrasound   - Replete phosphorus and calcium per protocol.     I discussed the patients findings and my recommendations with patient and nursing staff    Sushil Thornton MD  04/28/25

## 2025-04-28 NOTE — PROGRESS NOTES
Intensive Care Follow-up     Hospital:  LOS: 1 day   Mr. Juan C Castro, 55 y.o. male is followed for:   Seizure            History of present illness:   55-year-old gentleman presented to the emergency room with nausea rapid breathing chills and elevated blood pressure.  He also reports reduced appetite for the last 1 month.  Apparently he lost his job and stopped getting refills on his blood pressure and other medications.  He reports that he has been borderline diabetic.  In the emergency room he was noted to have significantly elevated blood pressure with witnessed seizure.  The seizure was described to me as tonic seizure unresponsive for approximately 2 minutes with teeth tightly clenched.  He received benzodiazepine in the emergency room.  ER physician/ARNP spoke with neurologist Dr. Gonzalez who recommended no Keppra but use scheduled Ativan.  They also recommended stat EEG.  Patient was also noted to be in acute DKA and initiated on insulin drip per Glucomander protocol in the emergency room.  At the time of my evaluation patient was little sleepy but wakes up follows simple commands.  Denies any headache double vision or blurred vision.  Is currently requiring 2 L oxygen nasal cannula.  Some history of marijuana and other drug abuse as well.      Subjective   Interval History:  Patient awake and alert this morning having quite a bit of pain over his right arm.  X-rays this morning showed fractures.  Denies any chest pain, nausea, fever, or chills.             The patient's past medical, surgical and social history were reviewed and updated in Epic as appropriate.       Objective     Infusions:  insulin, 0-100 Units/hr, Last Rate: Stopped (04/28/25 1151)  niCARdipine, 5-15 mg/hr, Last Rate: Stopped (04/28/25 1150)  sodium bicarbonate 8.4 % 150 mEq in dextrose (D5W) 5 % 1,000 mL infusion (greater than 100 mEq), 150 mEq      Medications:  diazePAM, 10 mg, Oral, Q6H  folic acid 1 mg in sodium chloride  0.9 % 50 mL IVPB, 1 mg, Intravenous, Daily  insulin glargine, 10 Units, Subcutaneous, Daily  insulin lispro, 2-9 Units, Subcutaneous, 4x Daily AC & at Bedtime  lisinopril, 20 mg, Oral, Q24H  mupirocin, 1 Application, Each Nare, BID  senna-docusate sodium, 2 tablet, Oral, BID  sodium chloride, 10 mL, Intravenous, Q12H  sodium chloride, 10 mL, Intravenous, Q12H      I reviewed the patient's medications.    Vital Sign Min/Max for last 24 hours  Temp  Min: 97.5 °F (36.4 °C)  Max: 98.6 °F (37 °C)   BP  Min: 122/83  Max: 253/139   Pulse  Min: 71  Max: 158   Resp  Min: 16  Max: 28   SpO2  Min: 85 %  Max: 100 %   Flow (L/min) (Oxygen Therapy)  Min: 2  Max: 2       Input/Output for last 24 hour shift  04/27 0701 - 04/28 0700  In: 5289 [I.V.:3289]  Out: 1300 [Urine:1300]      GENERAL : NAD, conversant  RESPIRATORY/THORAX : normal respiratory effort and no intercostal retractions, Coarse crackles bilaterally  CARDIOVASCULAR : Normal S1/S2, RRR. 1+ lower ext edema.  GASTROINTESTINAL : Soft, NT/ND. BS x 4 normoactive. No hepatosplenomegaly.  MUSCULOSKELETAL : No cyanosis, clubbing, or ischemia  NEUROLOGICAL: alert and oriented to person, place and time  PSYCHOLOGICAL : Appropriate affect    Results from last 7 days   Lab Units 04/28/25  0428 04/27/25  1633 04/27/25  1230   WBC 10*3/mm3 20.20* 26.30* 18.63*   HEMOGLOBIN g/dL 18.0* 18.4* 20.4*   PLATELETS 10*3/mm3 196 223 235     Results from last 7 days   Lab Units 04/28/25  0850 04/28/25  0428 04/28/25  0122 04/27/25  1633 04/27/25  1229   SODIUM mmol/L 132* 125* 129*   < > 125*   POTASSIUM mmol/L 4.1 4.1 3.5   < > 3.9   CO2 mmol/L 17.0* 11.0* 15.0*   < > 17.0*   BUN mg/dL 11 11 11   < > 9   CREATININE mg/dL 1.47* 1.43* 1.50*   < > 1.16   MAGNESIUM mg/dL  --  3.2*  --   --  1.5*   PHOSPHORUS mg/dL  --   --   --   --  1.8*   GLUCOSE mg/dL 171* 169* 92   < > 240*    < > = values in this interval not displayed.     Estimated Creatinine Clearance: 71.7 mL/min (A) (by C-G formula  based on SCr of 1.47 mg/dL (H)).          I reviewed the patient's new clinical results.  I reviewed the patient's new imaging results/reports including actual images and agree with reports.       Imaging Results (Last 24 Hours)       Procedure Component Value Units Date/Time    CT Upper Extremity Right Without Contrast [[id number]] Resulted: 04/28/25 1159     Updated: 04/28/25 1159    MRI Brain With & Without Contrast [[id number]] Collected: 04/28/25 0129     Updated: 04/28/25 0135    Narrative:        MRI BRAIN W WO CONTRAST    Date of Exam: 4/27/2025 11:14 PM EDT    Indication: seizure.     Comparison: CT head 4/27/2025.    Technique:  Routine multiplanar/multisequence sequence images of the brain were obtained before and after the uneventful administration of 20 mL Multihance.      Findings:  There is no diffusion restriction to suggest acute infarct. There is no evidence of acute or chronic intracranial hemorrhage. There is a small focus of encephalomalacia and hemosiderin staining in the inferior left cerebellum, which likely corresponds to   a chronic lacunar infarct. No mass effect or midline shift. No abnormal extra-axial collections.  The major vascular flow voids appear intact. The basal ganglia, brainstem and cerebellum appear within normal limits.  Calvarial and superficial soft   tissue signal is within normal limits.  Orbits appear unremarkable.  The paranasal sinuses and the mastoid air cells appear well aerated.  Midline structures are intact. No abnormal enhancement. No evidence of gray matter heterotopia. The hippocampus   appears normal bilaterally. No evidence of mesial temporal sclerosis. No obvious cortical dysplasia or migrational abnormality.      Impression:      Impression:  1.No acute intracranial abnormality. No epileptogenic focus identified.  2.Small chronic lacunar infarct in the inferior left cerebellum.            Electronically Signed: Conrad Bruce MD    4/28/2025 1:32 AM EDT     Workstation ID: [id number]    XR Shoulder 2+ View Right [[id number]] Collected: 04/27/25 1843     Updated: 04/27/25 1847    Narrative:      XR SHOULDER 2+ VW RIGHT    Date of Exam: 4/27/2025 6:00 PM EDT    Indication: Shoulder pain    Comparison: 4/27/2025    Findings:  There is an acute comminuted fracture of the proximal humerus. The humeral head appears to be dislocated somewhat posteriorly. AC joint appears within normal limits. Visualized portion of the right ribs are within normal limits      Impression:      Impression:  Acute comminuted displaced fracture of the proximal humerus.      Electronically Signed: Tyler Majano MD    4/27/2025 6:44 PM EDT    Workstation ID: [id number]    CT Head Without Contrast [[id number]] Collected: 04/27/25 1454     Updated: 04/27/25 1459    Narrative:      CT HEAD WO CONTRAST    Date of Exam: 4/27/2025 2:17 PM EDT    Indication: seizure.    Comparison: None available.    Technique: Axial CT images were obtained of the head without contrast administration.  Automated exposure control and iterative construction methods were used.      Findings:  There is no evidence of acute intracranial hemorrhage, mass, midline shift, loss of gray-white matter differentiation, or extra-axial fluid collection.  There is normal ventricular volume. The basal cisterns are patent.     No evidence of fracture.  The paranasal sinuses and mastoid air cells are predominantly well aerated.    The orbits and included soft tissues show no acute abnormality.         Impression:      Impression:  No acute intracranial abnormality.        Electronically Signed: Conrad Nicole MD    4/27/2025 2:56 PM EDT    Workstation ID: [id number]    XR Chest 1 View [[id number]] Collected: 04/27/25 1341     Updated: 04/27/25 1345    Narrative:      XR CHEST 1 VW    Date of Exam: 4/27/2025 1:15 PM EDT    Indication: Tachypnea    Comparison: None available.    Findings:  The cardiomediastinal silhouette is within normal limits.  Pulmonary vascularity appears normal. There is no focal airspace consolidation, pleural effusion, or pneumothorax. There are mild degenerative changes of the thoracic spine.      Impression:      Impression:  No acute cardiopulmonary abnormality.        Electronically Signed: Sean Graf MD    4/27/2025 1:42 PM EDT    Workstation ID: [id number]            Assessment & Plan   Impression        Seizure    Essential hypertension    Mixed hyperlipidemia    Alcohol withdrawal    Closed fracture of proximal end of right humerus    Stage 3a chronic kidney disease    Type 2 diabetes mellitus       Plan        55-year-old male with history of diabetes type 2, hypertension, CKD stage III, and alcohol abuse.  He presented to [facility] ICU status post seizure notable to have a right humerus fracture in addition to DKA on arrival.    -Continue nicardipine drip for blood pressure control and will start lisinopril 20 mg daily  - Will go ahead and transition insulin drip to subcu insulin, goal blood glucose less than 180  - Start p.o. diet  - Ortho has been consulted for right humerus fracture  - Start Valium 10 mg every 6 hours p.o. for concerns for alcohol withdrawal, continue CIWA protocol  - Statin for hyperlipidemia  - AM labs    I conducted multidisciplinary rounds and the plan of care was discussed with the multidisciplinary team at that time. In attendance at multidisciplinary rounds was clinical pharmacist, dietitian, nursing staff, and case management.    I discussed the patient's findings and my recommendations with patient and nursing staff     Critical Care time spent in direct patient care: 35 minutes (excluding procedure time, if applicable) including high complexity decision making to assess, manipulate, and support vital organ system failure in this individual who has impairment of one or more vital organ systems such that there is a high probability of imminent or life threatening deterioration  in the patient's condition.      Marilyn Alvares, DO  Pulmonary, Critical care and Sleep Medicine

## 2025-04-28 NOTE — PROGRESS NOTES
Neurology Note    Patient:  Juan C Castro    YOB: 1970    REFERRING PHYSICIAN:  Dr. Alvares    CHIEF COMPLAINT:    seizure    HISTORY OF PRESENT ILLNESS:   The patient has not had any further seizures. IV Ativan changed to po diazepam. Found to have an acute right humeral head fracture, getting prn pain meds.    Past Medical History:  Past Medical History:   Diagnosis Date    Hyperlipidemia     Hypertension     Kidney disease        Past Surgical History:  Past Surgical History:   Procedure Laterality Date    COLONOSCOPY      KNEE ACL RECONSTRUCTION         Social History:   Social History     Socioeconomic History    Marital status: Single   Tobacco Use    Smoking status: Every Day     Current packs/day: 0.50     Average packs/day: 0.5 packs/day for 30.0 years (15.0 ttl pk-yrs)     Types: Cigarettes    Smokeless tobacco: Never   Vaping Use    Vaping status: Never Used   Substance and Sexual Activity    Alcohol use: Yes     Alcohol/week: 6.0 standard drinks of alcohol     Types: 6 Cans of beer per week    Drug use: Yes     Types: Marijuana        Family History:   Family History   Problem Relation Age of Onset    Diabetes Mother     Hypertension Mother     Heart attack Father     Heart attack Paternal Grandfather        Medications Prior to Admission:    Prior to Admission medications    Medication Sig Start Date End Date Taking? Authorizing Provider   Alcohol Swabs (Alcohol Pads) 70 % pads Apply one alcohol swab to injection site of skin immediately prior to insulin injection. 4/28/25   Elmira Krueger APRN   aspirin 81 MG EC tablet Take 1 tablet by mouth Daily. 6/23/20   Elin Tolentino APRN   atorvastatin (LIPITOR) 20 MG tablet Take 1 tablet by mouth Daily. 7/8/22   Micaela Peter MD   glucose blood test strip Use 1 each to test blood glucose levels 4 (Four) Times a Day As Needed. 4/28/25   Elmira Krueger APRN   Blood Glucose Monitoring Suppl (Blood Glucose Monitor  System) w/Device kit Use to test blood glucose levels 4 (Four) Times a Day As Needed. 4/28/25   Elmira Krueger APRN   Lancets misc Use 1 each to test blood glucose levels 4 (Four) Times a Day As Needed. 4/28/25   Elmira Krueger APRN   lisinopril (PRINIVIL,ZESTRIL) 30 MG tablet TAKE 1 TABLET BY MOUTH DAILY 10/3/22   Micaela Peter MD   LORazepam (Ativan) 0.5 MG tablet Take 1 tablet by mouth Every 8 (Eight) Hours As Needed for Anxiety. 1/21/22   Micaela Peter MD   omeprazole (priLOSEC) 40 MG capsule TAKE 1 CAPSULE BY MOUTH DAILY 8/15/22   Micaela Peter MD   ondansetron (ZOFRAN) 4 MG tablet Take 1 tablet by mouth Every 8 (Eight) Hours As Needed for Nausea or Vomiting.    Provider, MD Migue   sildenafil (Viagra) 25 MG tablet Take 1 tablet by mouth Daily As Needed for Erectile Dysfunction. 2/21/22   Micaela Peter MD       Allergies:  Patient has no known allergies.      Review of system  Review of Systems   Musculoskeletal:         Right shoulder pain.   All other systems reviewed and are negative.      Vitals:    04/28/25 1500   BP: (!) 186/96   Pulse: 92   Resp:    Temp:    SpO2: 93%       Physical exam  Physical Exam  Cardiovascular:      Rate and Rhythm: Normal rate and regular rhythm.   Pulmonary:      Effort: Pulmonary effort is normal.   Neurological:      General: No focal deficit present.      Mental Status: He is alert and oriented to person, place, and time.      Comments: Somewhat anxious, speech clear, no facial droop, no tremor noted, moves limbs well.           Lab Results   Component Value Date    WBC 20.20 (H) 04/28/2025    HGB 18.0 (H) 04/28/2025    HCT 55.0 (H) 04/28/2025    MCV 99.8 (H) 04/28/2025     04/28/2025     Lab Results   Component Value Date    GLUCOSE 171 (H) 04/28/2025    BUN 11 04/28/2025    CREATININE 1.47 (H) 04/28/2025    EGFRIFNONA 61 07/02/2021    BCR 7.5 04/28/2025    CO2 17.0 (L) 04/28/2025    CALCIUM 8.0 (L) 04/28/2025    ALBUMIN  4.7 04/27/2025    AST 28 04/27/2025    ALT 34 04/27/2025         Radiological Studies:   EEG  Result Date: 4/28/2025  Reason for referral: 55 y.o.male with seizure Technical Summary:  A 19 channel digital EEG was performed using the international 10-20 placement system, including eye leads and EKG leads. Duration: 20 minutes Findings: The patient is awake.  A medium amplitude well-regulated 10 Hz posterior rhythm is present symmetrically and somewhat widespread over the posterior leads.  Intermixed theta and alpha activity is seen anteriorly.  Drowsiness and light sleep are seen with mild slowing of the tracing.  Photic stimulation does not change the background.  Hyperventilation is not performed. Video: Available Technical quality: Good Rhythm strip: Regular, 80 bpm SUMMARY: Normal EEG in the awake and lightly asleep states No focal features or epileptiform activity are seen     Normal study This report is transcribed using the Dragon dictation system.  [     MRI Brain With & Without Contrast  Result Date: 4/28/2025  MRI BRAIN W WO CONTRAST Date of Exam: 4/27/2025 11:14 PM EDT Indication: seizure.  Comparison: CT head 4/27/2025. Technique:  Routine multiplanar/multisequence sequence images of the brain were obtained before and after the uneventful administration of 20 mL Multihance. Findings: There is no diffusion restriction to suggest acute infarct. There is no evidence of acute or chronic intracranial hemorrhage. There is a small focus of encephalomalacia and hemosiderin staining in the inferior left cerebellum, which likely corresponds to  a chronic lacunar infarct. No mass effect or midline shift. No abnormal extra-axial collections.  The major vascular flow voids appear intact. The basal ganglia, brainstem and cerebellum appear within normal limits.  Calvarial and superficial soft tissue signal is within normal limits.  Orbits appear unremarkable.  The paranasal sinuses and the mastoid air cells appear well  aerated.  Midline structures are intact. No abnormal enhancement. No evidence of gray matter heterotopia. The hippocampus appears normal bilaterally. No evidence of mesial temporal sclerosis. No obvious cortical dysplasia or migrational abnormality.     Impression: 1.No acute intracranial abnormality. No epileptogenic focus identified. 2.Small chronic lacunar infarct in the inferior left cerebellum. Electronically Signed: Conrad Bruce MD  4/28/2025 1:32 AM EDT  Workstation ID: [id number]    XR Shoulder 2+ View Right  Result Date: 4/27/2025  XR SHOULDER 2+ VW RIGHT Date of Exam: 4/27/2025 6:00 PM EDT Indication: Shoulder pain Comparison: 4/27/2025 Findings: There is an acute comminuted fracture of the proximal humerus. The humeral head appears to be dislocated somewhat posteriorly. AC joint appears within normal limits. Visualized portion of the right ribs are within normal limits     Impression: Acute comminuted displaced fracture of the proximal humerus. Electronically Signed: Tyler Majano MD  4/27/2025 6:44 PM EDT  Workstation ID: [id number]    CT Head Without Contrast  Result Date: 4/27/2025  CT HEAD WO CONTRAST Date of Exam: 4/27/2025 2:17 PM EDT Indication: seizure. Comparison: None available. Technique: Axial CT images were obtained of the head without contrast administration.  Automated exposure control and iterative construction methods were used. Findings: There is no evidence of acute intracranial hemorrhage, mass, midline shift, loss of gray-white matter differentiation, or extra-axial fluid collection.  There is normal ventricular volume. The basal cisterns are patent. No evidence of fracture.  The paranasal sinuses and mastoid air cells are predominantly well aerated. The orbits and included soft tissues show no acute abnormality.      Impression: No acute intracranial abnormality. Electronically Signed: Conrad Nicole MD  4/27/2025 2:56 PM EDT  Workstation ID: [id number]    XR Chest 1 View  Result Date:  4/27/2025  XR CHEST 1 VW Date of Exam: 4/27/2025 1:15 PM EDT Indication: Tachypnea Comparison: None available. Findings: The cardiomediastinal silhouette is within normal limits. Pulmonary vascularity appears normal. There is no focal airspace consolidation, pleural effusion, or pneumothorax. There are mild degenerative changes of the thoracic spine.     Impression: No acute cardiopulmonary abnormality. Electronically Signed: Sean Graf MD  4/27/2025 1:42 PM EDT  Workstation ID: [id number]        During this visit the following were done:  Labs Reviewed [x]    Labs Ordered []    Radiology Reports Reviewed [x]    Radiology Ordered []    EKG, echo, and/or stress test reviewed []    EEG results reviewed  [x]    EEG reviewed and interpreted per myself   []    Discussed case with neurointerventionalist or neuroradiologist []    Referring Provider Records Reviewed []    ER Records Reviewed []    Hospital Records Reviewed []    History Obtained From Family []    Radiological images view and Interpreted per myself []    Case Discussed with referring provider []     Decision to obtain and request outside records  []        Assessment and Plan     New-onset generalized tonic seizure in the setting of severe HTN, suspected DKA, sleep deprivation, daily alcohol and street Ativan use. MRI brain is negative for acute changes, EEG is normal.   - Continue diazepam, decrease dose as tolerated..   - Thiamine.   - CIWA.   - No driving for 90 days on discharge.  - Outpatient neurology F/U.    Call for questions, will see prn. Thanks.                 Electronically signed by Sanchez Schneider MD on 4/28/2025 at 15:31 EDT

## 2025-04-28 NOTE — CONSULTS
"Diabetes Education  Assessment/Teaching    Patient Name:  Juan C Castro  YOB: 1970  MRN: [id number]  Admit Date:  4/27/2025      Assessment Date:  4/28/2025  Flowsheet Row Most Recent Value   General Information     Referral From: Other (comment)  [On insulin drip for DKA or HHS]   Height 182.9 cm (72\")   Height Method Stated   Weight 104 kg (229 lb 4.5 oz)   Weight Method Bed scale   Is patient pregnant? n/a   Pregnancy Assessment    Diabetes History    What type of diabetes do you have? Other (comment)  [MD note indicates type 2 diabetes. Patient states he was told by his PCP prediabetes.]   Length of Diabetes Diagnosis Newly diagnosed <6 months   Current DM knowledge poor   Have you had diabetes education/teaching in the past? no   Do you test your blood sugar at home? no   Have you had low blood sugar? (<70mg/dl) no   Education Preferences    What areas of diabetes would you like to learn about? avoiding high blood sugar, avoiding low blood sugar, diabetes complications, diet information, exercise information, medications for diabetes, resources on diabetes, stress/coping skills, testing my blood sugar at home, understanding diabetes   Nutrition Information    Assessment Topics    Healthy Eating - Assessment Needs education   Being Active - Assessment Needs education   Taking Medication - Assessment Needs education   Problem Solving - Assessment Needs education   Reducing Risk - Assessment Needs education   Healthy Coping - Assessment Needs education   Monitoring - Assessment Needs education   DM Goals             Flowsheet Row Most Recent Value   DM Education Needs    Meter Needs meter   Blood Glucose Target Range Target ranges per ADA guidelines   Problem Solving Hypoglycemia, Hyperglycemia, Sick days, Signs, Symptoms, Treatment   Reducing Risks A1C testing, Lipids, Blood pressure   Healthy Eating Reviewed meal plan   Physical Activity Frequency Discussed exercise importance   Healthy " Coping Appropriate   Motivation Engaged   Teaching Method Explanation, Discussion, Handouts   Patient Response Needs reinforcement          Total time spent reviewing chart, preparing education/materials, providing education at bedside, and coordinating care approx 45 minutes.    Consult for diabetes education received per On Insulin Drip for DKA or HHS. Chart reviewed. Ms. Castro was seen at bedside today. Permission given for visit.     Discussed and reviewed concepts about pre- and type 2 diabetes self-management, risk factors, and importance of blood glucose control to reduce complications. Target blood glucose readings and A1c goals per ADA were reviewed. Reviewed with patient current A1c 6.6 and discussed its significance. Signs, symptoms, and treatment of hyperglycemia and hypoglycemia were discussed. Patient states that he has had blurry vision, increase thirst and increased urination recently. Discussed non-medication treatments for hyperglycemia. Patient states that he has family history of diabetes and has a family member with Type 1 diabetes as well. Patient does not currently have a home glucometer. Message sent to attending MD regarding a possible order for meter, strips, and lancets via [facility].     Patient does not currently have any home medications prescribed for diabetes management. Patient states he had a lapse in insurance coverage and does not currently have a PCP. Encouraged him to establish care with a PCP for follow up regarding A1c, BP, labs, immunizations, and diabetes management.    Lifestyle changes such as physical activity with MD approval and healthy eating were encouraged. Encouraged working towards a goal of 150 minutes of physical activity per week.     Encouraged pt to monitor blood sugar at home 4  times per day and to call PCP if blood sugar is trending high. Diabetes Education will provide meter education if a meter is acquired for the patient via [facility]s [facility] Beds.  "Informed patient of education options if meter is acquired after hospitalization including pharmacy, Outpatient Diabetes Education, PCP, etc. Please re-consult for meter teach.    Encouraged to keep record of blood glucose readings to take to follow up appointment with PCP. Provided patient with copy of [facility]'s \"Diabetes Basics\" handout, \"What is A1c\" handout, and ADA's \"A Balanced Plate\" handout.     Thank you for this consult.           Electronically signed by:  Sierra Metcalf RN  04/28/25 12:59 EDT  "

## 2025-04-29 LAB
ANION GAP SERPL CALCULATED.3IONS-SCNC: 11 MMOL/L (ref 5–15)
BACTERIA UR QL AUTO: NORMAL /HPF
BASOPHILS # BLD AUTO: 0.03 10*3/MM3 (ref 0–0.2)
BASOPHILS NFR BLD AUTO: 0.2 % (ref 0–1.5)
BILIRUB UR QL STRIP: NEGATIVE
BUN SERPL-MCNC: 11 MG/DL (ref 6–20)
BUN/CREAT SERPL: 9.2 (ref 7–25)
CALCIUM SPEC-SCNC: 8.3 MG/DL (ref 8.6–10.5)
CHLORIDE SERPL-SCNC: 100 MMOL/L (ref 98–107)
CLARITY UR: ABNORMAL
CO2 SERPL-SCNC: 21 MMOL/L (ref 22–29)
COLOR UR: YELLOW
CREAT SERPL-MCNC: 1.19 MG/DL (ref 0.76–1.27)
CREAT UR-MCNC: 58.2 MG/DL
DEPRECATED RDW RBC AUTO: 43.7 FL (ref 37–54)
EGFRCR SERPLBLD CKD-EPI 2021: 72.6 ML/MIN/1.73
EOSINOPHIL # BLD AUTO: 0.08 10*3/MM3 (ref 0–0.4)
EOSINOPHIL NFR BLD AUTO: 0.5 % (ref 0.3–6.2)
ERYTHROCYTE [DISTWIDTH] IN BLOOD BY AUTOMATED COUNT: 12.7 % (ref 12.3–15.4)
GLUCOSE BLDC GLUCOMTR-MCNC: 122 MG/DL (ref 70–130)
GLUCOSE BLDC GLUCOMTR-MCNC: 133 MG/DL (ref 70–130)
GLUCOSE BLDC GLUCOMTR-MCNC: 156 MG/DL (ref 70–130)
GLUCOSE BLDC GLUCOMTR-MCNC: 164 MG/DL (ref 70–130)
GLUCOSE SERPL-MCNC: 181 MG/DL (ref 65–99)
GLUCOSE UR STRIP-MCNC: ABNORMAL MG/DL
HCT VFR BLD AUTO: 44.3 % (ref 37.5–51)
HGB BLD-MCNC: 15.6 G/DL (ref 13–17.7)
HGB UR QL STRIP.AUTO: ABNORMAL
HYALINE CASTS UR QL AUTO: NORMAL /LPF
IMM GRANULOCYTES # BLD AUTO: 0.06 10*3/MM3 (ref 0–0.05)
IMM GRANULOCYTES NFR BLD AUTO: 0.4 % (ref 0–0.5)
KETONES UR QL STRIP: NEGATIVE
LEUKOCYTE ESTERASE UR QL STRIP.AUTO: NEGATIVE
LYMPHOCYTES # BLD AUTO: 1.62 10*3/MM3 (ref 0.7–3.1)
LYMPHOCYTES NFR BLD AUTO: 11.1 % (ref 19.6–45.3)
MAGNESIUM SERPL-MCNC: 2.3 MG/DL (ref 1.6–2.6)
MCH RBC QN AUTO: 32.7 PG (ref 26.6–33)
MCHC RBC AUTO-ENTMCNC: 35.2 G/DL (ref 31.5–35.7)
MCV RBC AUTO: 92.9 FL (ref 79–97)
MONOCYTES # BLD AUTO: 1.36 10*3/MM3 (ref 0.1–0.9)
MONOCYTES NFR BLD AUTO: 9.3 % (ref 5–12)
NEUTROPHILS NFR BLD AUTO: 11.41 10*3/MM3 (ref 1.7–7)
NEUTROPHILS NFR BLD AUTO: 78.5 % (ref 42.7–76)
NITRITE UR QL STRIP: NEGATIVE
NRBC BLD AUTO-RTO: 0 /100 WBC (ref 0–0.2)
PH UR STRIP.AUTO: 6 [PH] (ref 5–8)
PHOSPHATE SERPL-MCNC: 2.1 MG/DL (ref 2.5–4.5)
PLATELET # BLD AUTO: 178 10*3/MM3 (ref 140–450)
PMV BLD AUTO: 10.1 FL (ref 6–12)
POTASSIUM SERPL-SCNC: 4.6 MMOL/L (ref 3.5–5.2)
PROT UR QL STRIP: NEGATIVE
RBC # BLD AUTO: 4.77 10*6/MM3 (ref 4.14–5.8)
RBC # UR STRIP: NORMAL /HPF
REF LAB TEST METHOD: NORMAL
SODIUM SERPL-SCNC: 132 MMOL/L (ref 136–145)
SP GR UR STRIP: 1.01 (ref 1–1.03)
SQUAMOUS #/AREA URNS HPF: NORMAL /HPF
UROBILINOGEN UR QL STRIP: ABNORMAL
UUN 24H UR-MCNC: 282 MG/DL
WBC # UR STRIP: NORMAL /HPF
WBC NRBC COR # BLD AUTO: 14.56 10*3/MM3 (ref 3.4–10.8)

## 2025-04-29 PROCEDURE — 25010000002 FOLIC ACID 5 MG/ML SOLUTION 10 ML VIAL

## 2025-04-29 PROCEDURE — 63710000001 INSULIN GLARGINE PER 5 UNITS: Performed by: INTERNAL MEDICINE

## 2025-04-29 PROCEDURE — 82948 REAGENT STRIP/BLOOD GLUCOSE: CPT

## 2025-04-29 PROCEDURE — 84100 ASSAY OF PHOSPHORUS: CPT | Performed by: INTERNAL MEDICINE

## 2025-04-29 PROCEDURE — 80048 BASIC METABOLIC PNL TOTAL CA: CPT | Performed by: INTERNAL MEDICINE

## 2025-04-29 PROCEDURE — 63710000001 INSULIN LISPRO (HUMAN) PER 5 UNITS: Performed by: INTERNAL MEDICINE

## 2025-04-29 PROCEDURE — 83735 ASSAY OF MAGNESIUM: CPT | Performed by: INTERNAL MEDICINE

## 2025-04-29 PROCEDURE — 25010000002 HYDROMORPHONE PER 4 MG: Performed by: PSYCHIATRY & NEUROLOGY

## 2025-04-29 PROCEDURE — 25010000003 DEXTROSE 5 % SOLUTION: Performed by: INTERNAL MEDICINE

## 2025-04-29 PROCEDURE — 99232 SBSQ HOSP IP/OBS MODERATE 35: CPT

## 2025-04-29 PROCEDURE — 85025 COMPLETE CBC W/AUTO DIFF WBC: CPT | Performed by: INTERNAL MEDICINE

## 2025-04-29 PROCEDURE — 25810000003 SODIUM CHLORIDE 0.9 % SOLUTION 250 ML FLEX CONT: Performed by: INTERNAL MEDICINE

## 2025-04-29 RX ORDER — HYDROCODONE BITARTRATE AND ACETAMINOPHEN 7.5; 325 MG/1; MG/1
1 TABLET ORAL EVERY 6 HOURS PRN
Refills: 0 | Status: DISCONTINUED | OUTPATIENT
Start: 2025-04-29 | End: 2025-05-02

## 2025-04-29 RX ORDER — AMLODIPINE BESYLATE 5 MG/1
5 TABLET ORAL
Status: DISCONTINUED | OUTPATIENT
Start: 2025-04-29 | End: 2025-04-30

## 2025-04-29 RX ORDER — PANTOPRAZOLE SODIUM 40 MG/1
40 TABLET, DELAYED RELEASE ORAL
Status: DISCONTINUED | OUTPATIENT
Start: 2025-04-29 | End: 2025-05-02 | Stop reason: HOSPADM

## 2025-04-29 RX ORDER — ATORVASTATIN CALCIUM 10 MG/1
10 TABLET, FILM COATED ORAL NIGHTLY
Status: DISCONTINUED | OUTPATIENT
Start: 2025-04-29 | End: 2025-05-02 | Stop reason: HOSPADM

## 2025-04-29 RX ADMIN — INSULIN GLARGINE 10 UNITS: 100 INJECTION, SOLUTION SUBCUTANEOUS at 08:30

## 2025-04-29 RX ADMIN — DIAZEPAM 10 MG: 10 TABLET ORAL at 09:39

## 2025-04-29 RX ADMIN — AMLODIPINE BESYLATE 5 MG: 5 TABLET ORAL at 09:39

## 2025-04-29 RX ADMIN — LISINOPRIL 20 MG: 20 TABLET ORAL at 08:31

## 2025-04-29 RX ADMIN — HYDROMORPHONE HYDROCHLORIDE 0.5 MG: 1 INJECTION, SOLUTION INTRAMUSCULAR; INTRAVENOUS; SUBCUTANEOUS at 20:07

## 2025-04-29 RX ADMIN — Medication 20 MG: at 03:05

## 2025-04-29 RX ADMIN — SODIUM BICARBONATE 60 ML/HR: 84 INJECTION INTRAVENOUS at 14:33

## 2025-04-29 RX ADMIN — SODIUM PHOSPHATE, MONOBASIC, MONOHYDRATE AND SODIUM PHOSPHATE, DIBASIC, ANHYDROUS 15 MMOL: 276; 142 INJECTION, SOLUTION INTRAVENOUS at 10:51

## 2025-04-29 RX ADMIN — HYDROCODONE BITARTRATE AND ACETAMINOPHEN 1 TABLET: 7.5; 325 TABLET ORAL at 09:39

## 2025-04-29 RX ADMIN — DIAZEPAM 10 MG: 10 TABLET ORAL at 21:45

## 2025-04-29 RX ADMIN — HYDROMORPHONE HYDROCHLORIDE 0.5 MG: 1 INJECTION, SOLUTION INTRAMUSCULAR; INTRAVENOUS; SUBCUTANEOUS at 01:32

## 2025-04-29 RX ADMIN — DIAZEPAM 10 MG: 10 TABLET ORAL at 15:40

## 2025-04-29 RX ADMIN — Medication 10 ML: at 08:31

## 2025-04-29 RX ADMIN — Medication 10 ML: at 20:09

## 2025-04-29 RX ADMIN — ATORVASTATIN CALCIUM 10 MG: 10 TABLET, FILM COATED ORAL at 20:09

## 2025-04-29 RX ADMIN — HYDROMORPHONE HYDROCHLORIDE 0.5 MG: 1 INJECTION, SOLUTION INTRAMUSCULAR; INTRAVENOUS; SUBCUTANEOUS at 07:31

## 2025-04-29 RX ADMIN — MUPIROCIN 1 APPLICATION: 20 OINTMENT TOPICAL at 08:31

## 2025-04-29 RX ADMIN — FOLIC ACID 1 MG: 5 INJECTION, SOLUTION INTRAMUSCULAR; INTRAVENOUS; SUBCUTANEOUS at 08:31

## 2025-04-29 RX ADMIN — HYDROMORPHONE HYDROCHLORIDE 0.5 MG: 1 INJECTION, SOLUTION INTRAMUSCULAR; INTRAVENOUS; SUBCUTANEOUS at 15:40

## 2025-04-29 RX ADMIN — HYDROMORPHONE HYDROCHLORIDE 0.5 MG: 1 INJECTION, SOLUTION INTRAMUSCULAR; INTRAVENOUS; SUBCUTANEOUS at 12:24

## 2025-04-29 RX ADMIN — INSULIN LISPRO 2 UNITS: 100 INJECTION, SOLUTION INTRAVENOUS; SUBCUTANEOUS at 21:42

## 2025-04-29 RX ADMIN — HYDROCODONE BITARTRATE AND ACETAMINOPHEN 1 TABLET: 7.5; 325 TABLET ORAL at 18:50

## 2025-04-29 RX ADMIN — INSULIN LISPRO 2 UNITS: 100 INJECTION, SOLUTION INTRAVENOUS; SUBCUTANEOUS at 07:30

## 2025-04-29 RX ADMIN — DIAZEPAM 10 MG: 10 TABLET ORAL at 04:13

## 2025-04-29 RX ADMIN — HYDROCODONE BITARTRATE AND ACETAMINOPHEN 1 TABLET: 5; 325 TABLET ORAL at 03:05

## 2025-04-29 RX ADMIN — PANTOPRAZOLE SODIUM 40 MG: 40 TABLET, DELAYED RELEASE ORAL at 12:16

## 2025-04-29 RX ADMIN — HYDROMORPHONE HYDROCHLORIDE 0.5 MG: 1 INJECTION, SOLUTION INTRAMUSCULAR; INTRAVENOUS; SUBCUTANEOUS at 23:10

## 2025-04-29 NOTE — PAYOR COMM NOTE
"Ref# [id number]   Clinical Update    KEYSHAWN Coburn, RN  Utilization Review  Phone [phone]  Fax [phone]    [place] [id number]         Juan C Castro (55 y.o. Male)       Date of Birth   1970    Social Security Number       Address   [address] [place] [id number]    Home Phone   [phone]    MRN   [id number]       [facility]    Marital Status   Single                            Admission Date   4/27/2025    Admission Type   Emergency    Admitting Provider   Tre Shaikh MD    Attending Provider   Juan C Alvares DO    Department, Room/Bed   [facility] 2B ICU, N225/1       Discharge Date       Discharge Disposition       Discharge Destination                                 Attending Provider: Juan C Alvares DO    Allergies: No Known Allergies    Isolation: None   Infection: None   Code Status: CPR    Ht: 182.9 cm (72\")   Wt: 104 kg (229 lb 4.5 oz)    Admission Cmt: None   Principal Problem: Seizure [R56.9]                   Active Insurance as of 4/27/2025       Primary Coverage       Payor Plan Insurance Group Employer/Plan Group    [employer]HE[employer] BLUE CROSS [employer] BLUE CROSS BLUE SHIELD PPO [id number]       Payor Plan Address Payor Plan Phone Number Payor Plan Fax Number Effective Dates    [address] [phone]  9/1/2020 - None Entered    [place] [address]         Subscriber Name Subscriber Birth Date Member ID       JUAN C CASTRO 1970 [id number]                     Emergency Contacts        (Rel.) Home Phone Work Phone Mobile Phone    Jocelyne Castro (Mother) -- -- [phone]    Albert Dennison (Significant Other) -- -- [phone]              Oxygen Therapy (last day)       Date/Time SpO2 Device (Oxygen Therapy) Flow (L/min) (Oxygen Therapy) Oxygen Concentration (%) ETCO2 (mmHg)    04/29/25 1200 97 room air -- -- --    04/29/25 " 1100 97 -- -- -- --    04/29/25 1000 91 -- -- -- --    04/29/25 0900 92 -- -- -- --    04/29/25 0800 90 room air -- -- --    04/29/25 0700 96 -- -- -- --    04/29/25 0600 96 nasal cannula with ETCO2 2 -- --    04/29/25 0545 94 nasal cannula with ETCO2 2 -- --    04/29/25 0540 92 -- -- -- --    04/29/25 0500 88 room air -- -- --    04/29/25 0400 91 room air -- -- --    04/29/25 0300 94 room air -- -- --    04/29/25 0200 95 room air -- -- --    04/29/25 0100 93 room air -- -- --    04/29/25 0000 94 room air -- -- --    04/28/25 2300 92 room air -- -- --    04/28/25 2200 93 room air -- -- --    04/28/25 2100 93 room air -- -- --    04/28/25 2000 97 room air -- -- --    04/28/25 1900 92 room air -- -- --    04/28/25 1800 92 -- -- -- --    04/28/25 1700 95 -- -- -- --    04/28/25 1600 94 room air -- -- --    04/28/25 1500 93 -- -- -- --    04/28/25 1400 92 -- -- -- --    04/28/25 1300 92 -- -- -- --    04/28/25 1145 93 -- -- -- --    04/28/25 1130 94 -- -- -- --    04/28/25 1115 94 -- -- -- --    04/28/25 1100 94 -- -- -- --    04/28/25 1045 95 -- -- -- --    04/28/25 1030 93 -- -- -- --    04/28/25 1015 93 -- -- -- --    04/28/25 1000 94 -- -- -- --    04/28/25 0945 94 -- -- -- --    04/28/25 0930 95 -- -- -- --    04/28/25 0915 94 -- -- -- --    04/28/25 0800 94 room air -- -- --    04/28/25 0745 90 -- -- -- --    04/28/25 0730 94 -- -- -- --    04/28/25 0715 92 -- -- -- --    04/28/25 0700 85 -- -- -- --    04/28/25 0645 93 -- -- -- --    04/28/25 0638 93 -- -- -- --    04/28/25 0630 92 -- -- -- --    04/28/25 0615 94 -- -- -- --    04/28/25 0600 94 nasal cannula 2 -- --    04/28/25 0545 94 -- -- -- --    04/28/25 0530 95 -- -- -- --    04/28/25 0526 95 -- -- -- --    04/28/25 0515 95 -- -- -- --    04/28/25 0500 93 -- -- -- --    04/28/25 0445 93 -- -- -- --    04/28/25 0430 94 -- -- -- --    04/28/25 0415 95 -- -- -- --    04/28/25 0400 95 nasal cannula 2 -- --    04/28/25 0345 94 -- -- -- --    04/28/25 0330 90 --  -- -- --    04/28/25 0315 94 -- -- -- --    04/28/25 0300 91 -- -- -- --    04/28/25 0245 94 -- -- -- --    04/28/25 0230 94 -- -- -- --    04/28/25 0215 92 -- -- -- --    04/28/25 0200 95 nasal cannula 2 -- --    04/28/25 0145 91 -- -- -- --    04/28/25 0142 95 -- -- -- --    04/28/25 0130 94 -- -- -- --    04/28/25 0115 96 -- -- -- --    04/28/25 0100 94 room air -- -- --    04/28/25 0046 96 -- -- -- --    04/28/25 0045 97 -- -- -- --          Current Facility-Administered Medications   Medication Dose Route Frequency Provider Last Rate Last Admin    amLODIPine (NORVASC) tablet 5 mg  5 mg Oral Q24H Prebble, SHAWNA JarvisH   5 mg at 04/29/25 0939    atorvastatin (LIPITOR) tablet 10 mg  10 mg Oral Nightly Kandy Villalpando APRN        sennosides-docusate (PERICOLACE) 8.6-50 MG per tablet 2 tablet  2 tablet Oral BID Tre Shaikh MD        And    polyethylene glycol (MIRALAX) packet 17 g  17 g Oral Daily PRN Tre Shaikh MD        And    bisacodyl (DULCOLAX) EC tablet 5 mg  5 mg Oral Daily PRN Tre Shaikh MD        And    bisacodyl (DULCOLAX) suppository 10 mg  10 mg Rectal Daily PRN Tre Shaikh MD        dextrose (D50W) (25 g/50 mL) IV injection 25 g  25 g Intravenous Q15 Min PRN PrebbleMatheus RPH        dextrose (GLUTOSE) oral gel 15 g  15 g Oral Q15 Min PRN Prebble, Matheus, SHAWNAH        diazePAM (VALIUM) tablet 10 mg  10 mg Oral Q6H Juan C Alvares DO   10 mg at 04/29/25 0939    folic acid 1 mg in sodium chloride 0.9 % 50 mL IVPB  1 mg Intravenous Daily Elmira Krueger C, APRN 100 mL/hr at 04/29/25 0831 1 mg at 04/29/25 0831    glucagon (GLUCAGEN) injection 1 mg  1 mg Intramuscular Q15 Min PRN Matheus Scanlon, [facility]        hydrALAZINE (APRESOLINE) tablet 25 mg  25 mg Oral Q8H PRN Juan C Alvares,         HYDROcodone-acetaminophen (NORCO) 7.5-325 MG per tablet 1 tablet  1 tablet Oral Q6H PRN Juan C Alvraes,    1 tablet at 04/29/25 0939    HYDROmorphone (DILAUDID)  injection 0.5 mg  0.5 mg Intravenous Q2H PRN Sanchez Schneider MD   0.5 mg at 04/29/25 1224    insulin glargine (LANTUS, SEMGLEE) injection 10 Units  10 Units Subcutaneous Daily Juan C Alvares, DO   10 Units at 04/29/25 0830    Insulin Lispro (humaLOG) injection 2-9 Units  2-9 Units Subcutaneous 4x Daily AC & at Bedtime Juan C Alvares DO   2 Units at 04/29/25 0730    ipratropium-albuterol (DUO-NEB) nebulizer solution 3 mL  3 mL Nebulization Q4H PRN Riaz Guzmán APRN        labetalol (NORMODYNE,TRANDATE) injection 20 mg  20 mg Intravenous Q4H PRN Riaz Guzmán APRN   20 mg at 04/29/25 0305    lisinopril (PRINIVIL,ZESTRIL) tablet 20 mg  20 mg Oral Q24H Juan C Alvares DO   20 mg at 04/29/25 0831    LORazepam (ATIVAN) injection 1 mg  1 mg Intravenous Q4H PRN Tre Shaikh MD        LORazepam (ATIVAN) injection 1 mg  1 mg Intravenous Q8H PRN Juan C Alvares DO        mupirocin (BACTROBAN) 2 % nasal ointment 1 Application  1 Application Each Nare BID Tre Shaikh MD   1 Application at 04/29/25 0831    nitroglycerin (NITROSTAT) SL tablet 0.4 mg  0.4 mg Sublingual Q5 Min PRN Tre Shaikh MD        pantoprazole (PROTONIX) EC tablet 40 mg  40 mg Oral Q AM Kandy Villalpando APRN   40 mg at 04/29/25 1216    Phosphorus Replacement - Follow Nurse / BPA Driven Protocol   Not Applicable PRN Juan C Alvares DO        sodium bicarbonate 150 mEq/1000 mL D5W infusion  60 mL/hr Intravenous Continuous Sushil Thornton MD 60 mL/hr at 04/28/25 2122 60 mL/hr at 04/28/25 2122    Sodium Chloride (PF) 0.9 % 10 mL  10 mL Intravenous PRN Alfred Back MD        sodium chloride 0.9 % flush 10 mL  10 mL Intravenous Q12H Tre Shaikh MD   10 mL at 04/29/25 0831    sodium chloride 0.9 % flush 10 mL  10 mL Intravenous PRN Tre Shaikh MD        sodium chloride 0.9 % infusion 40 mL  40 mL Intravenous PRN Tre Shaikh MD         Lab Results (last 24  hours)       Procedure Component Value Units Date/Time    Urinalysis With Culture If Indicated - Urine, Clean Catch [[id number]]  (Abnormal) Collected: 04/28/25 1609    Specimen: Urine, Clean Catch Updated: 04/29/25 1155     Color, UA Yellow     Appearance, UA Cloudy     pH, UA 6.0     Specific Gravity, UA 1.010     Glucose,  mg/dL (1+)     Ketones, UA Negative     Bilirubin, UA Negative     Blood, UA Trace     Protein, UA Negative     Leuk Esterase, UA Negative     Nitrite, UA Negative     Urobilinogen, UA 0.2 E.U./dL    Narrative:      In absence of clinical symptoms, the presence of pyuria, bacteria, and/or nitrites on the urinalysis result does not correlate with infection.    Urinalysis, Microscopic Only - Urine, Clean Catch [[id number]] Collected: 04/28/25 1609    Specimen: Urine, Clean Catch Updated: 04/29/25 1155     RBC, UA 0-2 /HPF      WBC, UA 0-2 /HPF      Comment: Urine culture not indicated.        Bacteria, UA None Seen /HPF      Squamous Epithelial Cells, UA 0-2 /HPF      Hyaline Casts, UA 0-6 /LPF      Methodology Automated Microscopy    POC Glucose Once [[id number]]  (Abnormal) Collected: 04/29/25 1149    Specimen: Blood Updated: 04/29/25 1151     Glucose 133 mg/dL     POC Glucose Once [[id number]]  (Abnormal) Collected: 04/29/25 0725    Specimen: Blood Updated: 04/29/25 0727     Glucose 164 mg/dL     Magnesium [[id number]]  (Normal) Collected: 04/29/25 0452    Specimen: Blood from Arm, Left Updated: 04/29/25 0557     Magnesium 2.3 mg/dL     Phosphorus [[id number]]  (Abnormal) Collected: 04/29/25 0452    Specimen: Blood from Arm, Left Updated: 04/29/25 0536     Phosphorus 2.1 mg/dL     Basic Metabolic Panel [[id number]]  (Abnormal) Collected: 04/29/25 0452    Specimen: Blood from Arm, Left Updated: 04/29/25 0536     Glucose 181 mg/dL      BUN 11 mg/dL      Creatinine 1.19 mg/dL      Sodium 132 mmol/L      Potassium 4.6 mmol/L      Comment: Slight hemolysis detected by analyzer. Result may be falsely  elevated.        Chloride 100 mmol/L      CO2 21.0 mmol/L      Calcium 8.3 mg/dL      BUN/Creatinine Ratio 9.2     Anion Gap 11.0 mmol/L      eGFR 72.6 mL/min/1.73     Narrative:      GFR Categories in Chronic Kidney Disease (CKD)      GFR Category          GFR (mL/min/1.73)    Interpretation  G1                     90 or greater         Normal or high (1)  G2                      60-89                Mild decrease (1)  G3a                   45-59                Mild to moderate decrease  G3b                   30-44                Moderate to severe decrease  G4                    15-29                Severe decrease  G5                    14 or less           Kidney failure          (1)In the absence of evidence of kidney disease, neither GFR category G1 or G2 fulfill the criteria for CKD.    eGFR calculation 2021 CKD-EPI creatinine equation, which does not include race as a factor    CBC & Differential [[id number]]  (Abnormal) Collected: 04/29/25 0452    Specimen: Blood from Arm, Left Updated: 04/29/25 0506    Narrative:      The following orders were created for panel order CBC & Differential.  Procedure                               Abnormality         Status                     ---------                               -----------         ------                     CBC Auto Differential[[id number]]        Abnormal            Final result                 Please view results for these tests on the individual orders.    CBC Auto Differential [[id number]]  (Abnormal) Collected: 04/29/25 0452    Specimen: Blood from Arm, Left Updated: 04/29/25 0506     WBC 14.56 10*3/mm3      RBC 4.77 10*6/mm3      Hemoglobin 15.6 g/dL      Hematocrit 44.3 %      MCV 92.9 fL      MCH 32.7 pg      MCHC 35.2 g/dL      RDW 12.7 %      RDW-SD 43.7 fl      MPV 10.1 fL      Platelets 178 10*3/mm3      Neutrophil % 78.5 %      Lymphocyte % 11.1 %      Monocyte % 9.3 %      Eosinophil % 0.5 %      Basophil % 0.2 %      Immature Grans % 0.4 %       Neutrophils, Absolute 11.41 10*3/mm3      Lymphocytes, Absolute 1.62 10*3/mm3      Monocytes, Absolute 1.36 10*3/mm3      Eosinophils, Absolute 0.08 10*3/mm3      Basophils, Absolute 0.03 10*3/mm3      Immature Grans, Absolute 0.06 10*3/mm3      nRBC 0.0 /100 WBC     Creatinine Urine Random (kidney function) GFR component - Urine, Clean Catch [[id number]] Collected: 04/28/25 1609    Specimen: Urine, Clean Catch Updated: 04/29/25 0251     Creatinine, Urine 58.2 mg/dL     Narrative:      Reference intervals for random urine have not been established.  Clinical usage is dependent upon physician's interpretation in combination with other laboratory tests.       Urea Nitrogen, Urine - Urine, Clean Catch [[id number]] Collected: 04/28/25 1609    Specimen: Urine, Clean Catch Updated: 04/29/25 0251     Urea Nitrogen, Urine 282 mg/dL     Narrative:      Reference intervals for random urine have not been established.  Clinical usage is dependent upon physician's interpretation in combination with other laboratory tests.       POC Glucose Once [[id number]]  (Abnormal) Collected: 04/28/25 2006    Specimen: Blood Updated: 04/28/25 2013     Glucose 138 mg/dL     Eosinophil Smear - Urine, Urine, Clean Catch [[id number]]  (Normal) Collected: 04/28/25 1609    Specimen: Urine, Clean Catch Updated: 04/28/25 1707     Eosinophil Smear 0 % EOS/100 Cells     Narrative:      No eosinophil seen    Protein, Urine, Random - Urine, Clean Catch [[id number]] Collected: 04/28/25 1609    Specimen: Urine, Clean Catch Updated: 04/28/25 1637     Total Protein, Urine 12.4 mg/dL     Narrative:      Reference intervals for random urine have not been established.  Clinical usage is dependent upon physician's interpretation in combination with other laboratory tests.       POC Glucose Once [[id number]]  (Abnormal) Collected: 04/28/25 1634    Specimen: Blood Updated: 04/28/25 1637     Glucose 173 mg/dL     Blood Culture - Blood, Hand, Right [[id number]]  (Normal)  Collected: 04/27/25 1550    Specimen: Blood from Hand, Right Updated: 04/28/25 1615     Blood Culture No growth at 24 hours    Blood Culture - Blood, Arm, Left [[id number]]  (Normal) Collected: 04/27/25 1540    Specimen: Blood from Arm, Left Updated: 04/28/25 1615     Blood Culture No growth at 24 hours          Imaging Results (Last 24 Hours)       Procedure Component Value Units Date/Time    US Renal Bilateral [[id number]] Collected: 04/29/25 0606     Updated: 04/29/25 0612    Narrative:      US RENAL BILATERAL    Date of Exam: 4/28/2025 11:24 PM EDT    Indication: DAKSHA.    Comparison: No comparisons available.    Technique: Grayscale and color Doppler ultrasound evaluation of the kidneys and urinary bladder was performed.        Findings:  The right kidney measures 11.2 x 5.8 x 5.8 cm in length and the left kidney measures 11.9 x 6.3 x 5.2 cm in length    Kidneys demonstrate normal cortical echogenicity.  No hydronephrosis or intrarenal stones.  No focal lesions.    Bladder is incompletely distended. Minimal debris noted within the bladder.      Impression:        1. Unremarkable ultrasound of the kidneys.    2. Some minimal debris noted within the bladder. Please correlate with urinalysis.    Electronically Signed: Bk Torres MD    4/29/2025 6:08 AM EDT    Workstation ID: [id number]    CT Upper Extremity Right Without Contrast [[id number]] Collected: 04/28/25 1536     Updated: 04/28/25 1556    Narrative:      CT UPPER EXTREMITY RIGHT WO CONTRAST    Date of Exam: 4/28/2025 11:51 AM EDT    Indication: right proximal humerus fracture.    Comparison: 4/27/2025    Technique: Axial CT images were obtained of the right upper extremity without contrast administration.  Reconstructed coronal and sagittal images were also obtained. Automated exposure control and iterative construction methods were used.      Findings:  There is a comminuted fracture of the proximal humerus. Fracture appears to involve the surgical and  anatomic necks and greater and lesser tuberosities. The primary humeral head component involving the majority of the articular surface is rotated   posteriorly relative to the humeral shaft, and dislocated from the glenoid articular surface. The humeral head articular surface projects posteriorly. There is impaction at the surgical neck with proximal migration of the humeral shaft. There is   comminution of the greater and lesser tuberosities which appear medially displaced relative to the expected locations. There are multiple small displaced fracture fragments, most notably at the anterior-lateral aspect at the level of the coracoid.    Mineralization appears grossly normal. Acromioclavicular alignment appears grossly maintained. There appear to be moderate degenerative changes at the acromioclavicular joint. There is some motion in the distal humerus. No other definite displaced   fracture is identified. Mineralization appears grossly normal.    There is suspected edema/hematoma in the surrounding soft tissues at the fracture site. Probable glenohumeral effusion. Rotator cuff tendons are not well visualized or evaluated on this exam. No significant muscle atrophy.      Impression:      Impression:  1.Comminuted fracture of the proximal humerus.  2.The humeral head articular surface is rotated posteriorly and dislocated from the glenoid articular surface.  3.Impaction at the surgical neck with proximal migration of the humeral shaft.  4.Comminution of the greater and lesser tuberosities which appear medially displaced.            Electronically Signed: Horacio Lopez    4/28/2025 3:53 PM EDT    Workstation ID: [id number]          ECG/EMG Results (last 24 hours)       Procedure Component Value Units Date/Time    Telemetry Scan [[id number]] Resulted: 04/27/25     Updated: 04/28/25 2013             Physician Progress Notes (last 24 hours)        Kandy Villalpando APRN at 04/29/25 0919            Intensive Care Follow-up      Hospital:  LOS: 2 days   Mr. Juan C Castro, 55 y.o. male is followed for:   Seizure   Subjective   Subjective   Interval History:  Alert and oriented. Very pleasant and conversant, sitting up in bed. NAD. Hemodynamically stable. No further seizures. Pain in right arm related to humerus fracture. ROS otherwise negative. Mother at bedside. All questions answered.      The patient's past medical, surgical and social history were reviewed and updated in Epic as appropriate.  Objective   Objective     Infusions:  sodium bicarbonate 150 mEq in D5W, 60 mL/hr, Last Rate: 60 mL/hr (04/28/25 2122)    Medications:  amLODIPine, 5 mg, Oral, Q24H  atorvastatin, 10 mg, Oral, Nightly  diazePAM, 10 mg, Oral, Q6H  folic acid 1 mg in sodium chloride 0.9 % 50 mL IVPB, 1 mg, Intravenous, Daily  insulin glargine, 10 Units, Subcutaneous, Daily  insulin lispro, 2-9 Units, Subcutaneous, 4x Daily AC & at Bedtime  lisinopril, 20 mg, Oral, Q24H  mupirocin, 1 Application, Each Nare, BID  pantoprazole, 40 mg, Oral, Q AM  senna-docusate sodium, 2 tablet, Oral, BID  sodium chloride, 10 mL, Intravenous, Q12H  sodium phosphate, 15 mmol, Intravenous, Once    I reviewed the patient's medications.    Vital Sign Min/Max for last 24 hours  Temp  Min: 97.4 °F (36.3 °C)  Max: 98.3 °F (36.8 °C)   BP  Min: 147/91  Max: 193/121   Pulse  Min: 72  Max: 101   Resp  Min: 16  Max: 20   SpO2  Min: 88 %  Max: 97 %   Flow (L/min) (Oxygen Therapy)  Min: 2  Max: 2       Input/Output for last 24 hour shift  04/28 0701 - 04/29 0700  In: 2505.7 [P.O.:716; I.V.:1339.7]  Out: 3930 [Urine:3930]       Physical Exam:  GENERAL: Patient lying in bed and conversant. No acute distress.   HEENT: Normocephalic and atraumatic. Trachea midline. PER. EOM WNL.   LUNGS: Chest rise of normal depth and symmetric. Lungs clear to auscultation bilaterally. No wheezes, rhonchi, or rales.   HEART: S1,S2 detected. Regular rate and rhythm. No rub, murmur, or gallop.   ABDOMEN:  Soft, round, nondistended, and nontender. Bowel sounds present.   EXTREMITIES: No clubbing, edema, or cyanosis. Peripheral pulses present. Skin warm and dry. Right upper extremity difficult to move due to fracture. Pulses intact. Significant bruising and some swelling.   NEURO/PSYCH: Alert and oriented. Follows commands. Moves all extremities. No focal deficits.      Results from last 7 days   Lab Units 04/29/25  0452 04/28/25  0428 04/27/25  1633   WBC 10*3/mm3 14.56* 20.20* 26.30*   HEMOGLOBIN g/dL 15.6 18.0* 18.4*   PLATELETS 10*3/mm3 178 196 223     Results from last 7 days   Lab Units 04/29/25  0452 04/28/25  1258 04/28/25  0850 04/28/25  0428 04/27/25  1633 04/27/25  1229   SODIUM mmol/L 132*  --  132* 125*   < > 125*   POTASSIUM mmol/L 4.6 4.2 4.1 4.1   < > 3.9   CO2 mmol/L 21.0*  --  17.0* 11.0*   < > 17.0*   BUN mg/dL 11  --  11 11   < > 9   CREATININE mg/dL 1.19  --  1.47* 1.43*   < > 1.16   MAGNESIUM mg/dL 2.3  --   --  3.2*  --  1.5*   PHOSPHORUS mg/dL 2.1*  --   --   --   --  1.8*   GLUCOSE mg/dL 181*  --  171* 169*   < > 240*    < > = values in this interval not displayed.     Estimated Creatinine Clearance: 88.5 mL/min (by C-G formula based on SCr of 1.19 mg/dL).      I reviewed the patient's new clinical results.  I reviewed the patient's new imaging results/reports including actual images and agree with reports.     Imaging Results (Last 24 Hours)       Procedure Component Value Units Date/Time    US Renal Bilateral [[id number]] Collected: 04/29/25 0606     Updated: 04/29/25 0612    Narrative:      US RENAL BILATERAL    Date of Exam: 4/28/2025 11:24 PM EDT    Indication: DAKSHA.    Comparison: No comparisons available.    Technique: Grayscale and color Doppler ultrasound evaluation of the kidneys and urinary bladder was performed.        Findings:  The right kidney measures 11.2 x 5.8 x 5.8 cm in length and the left kidney measures 11.9 x 6.3 x 5.2 cm in length    Kidneys demonstrate normal cortical  echogenicity.  No hydronephrosis or intrarenal stones.  No focal lesions.    Bladder is incompletely distended. Minimal debris noted within the bladder.      Impression:        1. Unremarkable ultrasound of the kidneys.    2. Some minimal debris noted within the bladder. Please correlate with urinalysis.    Electronically Signed: Bk Torres MD    4/29/2025 6:08 AM EDT    Workstation ID: [id number]    CT Upper Extremity Right Without Contrast [[id number]] Collected: 04/28/25 1536     Updated: 04/28/25 1556    Narrative:      CT UPPER EXTREMITY RIGHT WO CONTRAST    Date of Exam: 4/28/2025 11:51 AM EDT    Indication: right proximal humerus fracture.    Comparison: 4/27/2025    Technique: Axial CT images were obtained of the right upper extremity without contrast administration.  Reconstructed coronal and sagittal images were also obtained. Automated exposure control and iterative construction methods were used.      Findings:  There is a comminuted fracture of the proximal humerus. Fracture appears to involve the surgical and anatomic necks and greater and lesser tuberosities. The primary humeral head component involving the majority of the articular surface is rotated   posteriorly relative to the humeral shaft, and dislocated from the glenoid articular surface. The humeral head articular surface projects posteriorly. There is impaction at the surgical neck with proximal migration of the humeral shaft. There is   comminution of the greater and lesser tuberosities which appear medially displaced relative to the expected locations. There are multiple small displaced fracture fragments, most notably at the anterior-lateral aspect at the level of the coracoid.    Mineralization appears grossly normal. Acromioclavicular alignment appears grossly maintained. There appear to be moderate degenerative changes at the acromioclavicular joint. There is some motion in the distal humerus. No other definite displaced   fracture  is identified. Mineralization appears grossly normal.    There is suspected edema/hematoma in the surrounding soft tissues at the fracture site. Probable glenohumeral effusion. Rotator cuff tendons are not well visualized or evaluated on this exam. No significant muscle atrophy.      Impression:      Impression:  1.Comminuted fracture of the proximal humerus.  2.The humeral head articular surface is rotated posteriorly and dislocated from the glenoid articular surface.  3.Impaction at the surgical neck with proximal migration of the humeral shaft.  4.Comminution of the greater and lesser tuberosities which appear medially displaced.            Electronically Signed: Horacio Lopez    4/28/2025 3:53 PM EDT    Workstation ID: [id number]          Assessment & Plan   Impression      Seizure    Essential hypertension    Mixed hyperlipidemia    Alcohol withdrawal    Closed fracture of proximal end of right humerus    Stage 3a chronic kidney disease    Type 2 diabetes mellitus       Plan        Juan C Castro is a 55 y.o. male with PMHx T2DM, HTN, dyslipidemia, CKD, alcohol use, and prior substance abuse  who presented to [facility] ED on 4/27/25 for evaluation of hypertension, nausea, chills, and tachypnea. In the ED, he was found to have significantly elevated blood pressure and had a witnessed seizure. He became combative in the ED, requiring physical restraint. Patient had subsequent pain and bruising on right upper extremity. CXR and CT upper extremity showed comminuted proximal humeral fracture. Neurology was consulted and he was treated with scheduled Ativan. He was also found to be in DKA and started on insulin.  He was admitted to the ICU for further care.     For seizure: Neurology following. MRI brain negative for acute changes. EEG normal. Seizure precautions. Continue scheduled diazepam and PRN lorazepam.  For hypertension: Nicardipine weaned off. Lisinopril 20 mg daily and amlodipine 5 mg daily started  4/28/25. Will increase dose as necessary.  For dyslipidemia: Start stain therapy  For closed fracture of proximal end of right humerus: Orthopedic surgery consulted. Given the morphology of the fracture, shoulder arthroplasty was recommended. He was found to be neurovascularly intact; therefore, it was determined to be nonemergent and is to be completed at [facility]. Ice and sling for comfort. Increase PO pain medications.   For DAKSHA on CKD: Nephrology following. On bicarbonate drip for systemic acidosis (improving). Renal ultrasound completed this morning WNL. Minimal debris noted within the bladder. Urinalysis on 4/27/25 with 3-5 WBC. Patient asymptomatic. Follow up culture.  For alcohol withdrawal: Continue scheduled diazepam, thiamine, folic acid. Monitor CIWA scores. PRN lorazepam. Patient requested addictionology consult. Order added.  For T2DM: Hgb A1c 6.60 on 4/27/25. DKA resolved. Diabetes education saw patient. Will need diabetic testing equipment prescribed on discharge. AccuChecks ACHS, SSI.     DVT Prophylaxis: Mechanical  GI Prophylaxis: PPI  Dispo: Transfer to telemetry    Critical Care time spent in direct patient care: 35 minutes (excluding procedure time, if applicable) including high complexity decision making to assess, manipulate, and support vital organ system failure in this individual who has impairment of one or more vital organ systems such that there is a high probability of imminent or life threatening deterioration in the patient's condition.     Brenda Villalpando, MSN, APRN, ACNPC-AG  Pulmonary and Critical Care Medicine  Electronically signed by REINIER Gong, 04/29/25, 9:19 AM EDT.          Electronically signed by Kandy Villalpando APRN at 04/29/25 1034       NorbertoSushil MD at 04/29/25 0806             LOS: 2 days    Patient Care Team:  Provider, No Known as PCP - General    Chief Complaint:  Nausea, HTN and SOA    Subjective     Interval History:     No acute events  "overnight. No new complaints       Review of Systems:   No CP or SOA , fever, chills, rigors, rash, N/V, Constipation.       Objective     Vital Sign Min/Max for last 24 hours  Temp  Min: 97.4 °F (36.3 °C)  Max: 98.3 °F (36.8 °C)   BP  Min: 146/123  Max: 193/121   Pulse  Min: 73  Max: 107   Resp  Min: 16  Max: 20   SpO2  Min: 88 %  Max: 97 %   Flow (L/min) (Oxygen Therapy)  Min: 2  Max: 2   No data recorded     Flowsheet Rows      Flowsheet Row First Filed Value   Admission Height 182.9 cm (72\") Documented at 04/27/2025 1134   Admission Weight 102 kg (225 lb) Documented at 04/27/2025 1134            No intake/output data recorded.  I/O last 3 completed shifts:  In: 4714.7 [P.O.:716; I.V.:3548.7; IV Piggyback:450]  Out: 4580 [Urine:4580]    Physical Exam:    Gen: Alert, NAD   HENT: NC, AT, EOMI   NECK: Supple, no JVD, Trachea midline   LUNGS: CTA bilaterally, non labored respirtation   CVS: S1/S2 audible, RRR, no murmur   Abd: Soft, NT, ND, BS+   Ext: No pedal edema, no cyanosis   CNS: Alert, No focal deficit noted grossly  Psy: Cooperative  Skin: Warm, dry and intact      WBC WBC   Date Value Ref Range Status   04/29/2025 14.56 (H) 3.40 - 10.80 10*3/mm3 Final   04/28/2025 20.20 (H) 3.40 - 10.80 10*3/mm3 Final   04/27/2025 26.30 (H) 3.40 - 10.80 10*3/mm3 Final   04/27/2025 18.63 (H) 3.40 - 10.80 10*3/mm3 Final     Comment:     Modified report. Previous result was 18.82 10*3/mm3 on 4/27/2025 at 1258 EDT.      HGB Hemoglobin   Date Value Ref Range Status   04/29/2025 15.6 13.0 - 17.7 g/dL Final   04/28/2025 18.0 (H) 13.0 - 17.7 g/dL Final   04/27/2025 18.4 (H) 13.0 - 17.7 g/dL Final   04/27/2025 20.4 (H) 13.0 - 17.7 g/dL Final     Comment:     Modified report. Previous result was 20.0 g/dL on 4/27/2025 at 1258 EDT.      HCT Hematocrit   Date Value Ref Range Status   04/29/2025 44.3 37.5 - 51.0 % Final   04/28/2025 55.0 (H) 37.5 - 51.0 % Final   04/27/2025 51.5 (H) 37.5 - 51.0 % Final   04/27/2025 56.8 (H) 37.5 - 51.0 % " "Final     Comment:     Modified report. Previous result was 56.9 % on 4/27/2025 at 1258 EDT.      Platlets No results found for: \"LABPLAT\"   MCV MCV   Date Value Ref Range Status   04/29/2025 92.9 79.0 - 97.0 fL Final   04/28/2025 99.8 (H) 79.0 - 97.0 fL Final   04/27/2025 91.6 79.0 - 97.0 fL Final   04/27/2025 91.0 79.0 - 97.0 fL Final          Sodium Sodium   Date Value Ref Range Status   04/29/2025 132 (L) 136 - 145 mmol/L Final   04/28/2025 132 (L) 136 - 145 mmol/L Final   04/28/2025 125 (L) 136 - 145 mmol/L Final   04/28/2025 129 (L) 136 - 145 mmol/L Final   04/27/2025 127 (L) 136 - 145 mmol/L Final   04/27/2025 126 (L) 136 - 145 mmol/L Final   04/27/2025 125 (L) 136 - 145 mmol/L Final      Potassium Potassium   Date Value Ref Range Status   04/29/2025 4.6 3.5 - 5.2 mmol/L Final     Comment:     Slight hemolysis detected by analyzer. Result may be falsely elevated.   04/28/2025 4.2 3.5 - 5.2 mmol/L Final     Comment:     Slight hemolysis detected by analyzer. Result may be falsely elevated.   04/28/2025 4.1 3.5 - 5.2 mmol/L Final     Comment:     Slight hemolysis detected by analyzer. Result may be falsely elevated.   04/28/2025 4.1 3.5 - 5.2 mmol/L Final     Comment:     Slight hemolysis detected by analyzer. Result may be falsely elevated.   04/28/2025 3.5 3.5 - 5.2 mmol/L Final     Comment:     Slight hemolysis detected by analyzer. Result may be falsely elevated.   04/27/2025 3.9 3.5 - 5.2 mmol/L Final     Comment:     Slight hemolysis detected by analyzer. Result may be falsely elevated.   04/27/2025 3.9 3.5 - 5.2 mmol/L Final     Comment:     Slight hemolysis detected by analyzer. Result may be falsely elevated.   04/27/2025 3.9 3.5 - 5.2 mmol/L Final     Comment:     Slight hemolysis detected by analyzer. Result may be falsely elevated.      Chloride Chloride   Date Value Ref Range Status   04/29/2025 100 98 - 107 mmol/L Final   04/28/2025 103 98 - 107 mmol/L Final   04/28/2025 101 98 - 107 mmol/L Final " "  04/28/2025 101 98 - 107 mmol/L Final   04/27/2025 98 98 - 107 mmol/L Final   04/27/2025 94 (L) 98 - 107 mmol/L Final   04/27/2025 89 (L) 98 - 107 mmol/L Final      CO2 CO2   Date Value Ref Range Status   04/29/2025 21.0 (L) 22.0 - 29.0 mmol/L Final   04/28/2025 17.0 (L) 22.0 - 29.0 mmol/L Final   04/28/2025 11.0 (L) 22.0 - 29.0 mmol/L Final   04/28/2025 15.0 (L) 22.0 - 29.0 mmol/L Final   04/27/2025 16.0 (L) 22.0 - 29.0 mmol/L Final   04/27/2025 15.0 (L) 22.0 - 29.0 mmol/L Final   04/27/2025 17.0 (L) 22.0 - 29.0 mmol/L Final      BUN BUN   Date Value Ref Range Status   04/29/2025 11 6 - 20 mg/dL Final   04/28/2025 11 6 - 20 mg/dL Final   04/28/2025 11 6 - 20 mg/dL Final   04/28/2025 11 6 - 20 mg/dL Final   04/27/2025 10 6 - 20 mg/dL Final   04/27/2025 10 6 - 20 mg/dL Final   04/27/2025 9 6 - 20 mg/dL Final      Creatinine Creatinine   Date Value Ref Range Status   04/29/2025 1.19 0.76 - 1.27 mg/dL Final   04/28/2025 1.47 (H) 0.76 - 1.27 mg/dL Final   04/28/2025 1.43 (H) 0.76 - 1.27 mg/dL Final   04/28/2025 1.50 (H) 0.76 - 1.27 mg/dL Final   04/27/2025 1.30 (H) 0.76 - 1.27 mg/dL Final   04/27/2025 1.51 (H) 0.76 - 1.27 mg/dL Final   04/27/2025 1.16 0.76 - 1.27 mg/dL Final      Calcium Calcium   Date Value Ref Range Status   04/29/2025 8.3 (L) 8.6 - 10.5 mg/dL Final   04/28/2025 8.0 (L) 8.6 - 10.5 mg/dL Final   04/28/2025 7.7 (L) 8.6 - 10.5 mg/dL Final   04/28/2025 7.9 (L) 8.6 - 10.5 mg/dL Final   04/27/2025 7.7 (L) 8.6 - 10.5 mg/dL Final   04/27/2025 8.1 (L) 8.6 - 10.5 mg/dL Final   04/27/2025 9.1 8.6 - 10.5 mg/dL Final      PO4 No results found for: \"CAPO4\"   Albumin Albumin   Date Value Ref Range Status   04/27/2025 4.7 3.5 - 5.2 g/dL Final      Magnesium Magnesium   Date Value Ref Range Status   04/29/2025 2.3 1.6 - 2.6 mg/dL Final   04/28/2025 3.2 (H) 1.6 - 2.6 mg/dL Final   04/27/2025 1.5 (L) 1.6 - 2.6 mg/dL Final      Uric Acid Uric Acid   Date Value Ref Range Status   04/28/2025 5.8 3.4 - 7.0 mg/dL Final     " Comment:     Falsely depressed results may occur on samples drawn from patients receiving N-Acetylcysteine (NAC) or Metamizole.        Results from last 7 days   Lab Units 04/29/25  0452 04/28/25  1258 04/28/25  0850 04/28/25  0428 04/28/25  0122 04/27/25  2107 04/27/25  1633 04/27/25  1230 04/27/25  1229   SODIUM mmol/L 132*  --  132* 125* 129*   < > 126*  --  125*   POTASSIUM mmol/L 4.6 4.2 4.1 4.1 3.5   < > 3.9  --  3.9   CHLORIDE mmol/L 100  --  103 101 101   < > 94*  --  89*   CO2 mmol/L 21.0*  --  17.0* 11.0* 15.0*   < > 15.0*  --  17.0*   BUN mg/dL 11  --  11 11 11   < > 10  --  9   CREATININE mg/dL 1.19  --  1.47* 1.43* 1.50*   < > 1.51*  --  1.16   CALCIUM mg/dL 8.3*  --  8.0* 7.7* 7.9*   < > 8.1*  --  9.1   ALBUMIN g/dL  --   --   --   --   --   --   --   --  4.7   WBC 10*3/mm3 14.56*  --   --  20.20*  --   --  26.30* 18.63*  --    HEMOGLOBIN g/dL 15.6  --   --  18.0*  --   --  18.4* 20.4*  --    PLATELETS 10*3/mm3 178  --   --  196  --   --  223 235  --    GLUCOSE mg/dL 181*  --  171* 169* 92   < > 309*  --  240*   PHOSPHORUS mg/dL 2.1*  --   --   --   --   --   --   --  1.8*    < > = values in this interval not displayed.              Results Review:     I reviewed the patient's new clinical results.    amLODIPine, 5 mg, Oral, Q24H  diazePAM, 10 mg, Oral, Q6H  folic acid 1 mg in sodium chloride 0.9 % 50 mL IVPB, 1 mg, Intravenous, Daily  insulin glargine, 10 Units, Subcutaneous, Daily  insulin lispro, 2-9 Units, Subcutaneous, 4x Daily AC & at Bedtime  lisinopril, 20 mg, Oral, Q24H  mupirocin, 1 Application, Each Nare, BID  senna-docusate sodium, 2 tablet, Oral, BID  sodium chloride, 10 mL, Intravenous, Q12H      niCARdipine, 5-15 mg/hr, Last Rate: Stopped (04/28/25 1150)  sodium bicarbonate 150 mEq in D5W, 60 mL/hr, Last Rate: 60 mL/hr (04/28/25 2122)    Findings:  The right kidney measures 11.2 x 5.8 x 5.8 cm in length and the left kidney measures 11.9 x 6.3 x 5.2 cm in length     Kidneys demonstrate  normal cortical echogenicity.  No hydronephrosis or intrarenal stones.  No focal lesions.     Bladder is incompletely distended. Minimal debris noted within the bladder.     IMPRESSION:     1. Unremarkable ultrasound of the kidneys.     2. Some minimal debris noted within the bladder. Please correlate with urinalys    Medication Review: yes    Assessment & Plan       Seizure    Essential hypertension    Mixed hyperlipidemia    Alcohol withdrawal    Closed fracture of proximal end of right humerus    Stage 3a chronic kidney disease    Type 2 diabetes mellitus      1- DAKSHA on CKD - Pre-renal azotemia vs DAKSHA secondary to Hypertensive crisis. UA + protein, glucose and RBC 3-5. Improving   2- CKD - baseline Scr 1.1-1.3 range. UPCR 0.21  3- Metabolic acidosis - improving.   4- Hyponatremia - corrected for hyperglycemia -134  5- Hypophosphatemia - improving.   6- HTN encephalopathy      Plan  - Continue with gentle hydration.   - Monitor I/O   - Avoid nephrotoxic agents.   - Renal diet   - Adjust meds per renal function   - No emergent need of RRT   - Monitor H/H and transfuse for Hgb less than 7.0   - Replete phosphorus and calcium per protocol.     Sushil Thornton MD  04/29/25  08:18 EDT      Electronically signed by Sushil Thornton MD at 04/29/25 2517       Dawood Bonner MD at 04/28/25 3537          Have reviewed this patient CT scan. I was asked by one of my practice partners DR Nieto to evaluate his imaging and formulate a treatment plan.    The patient has a comminuted proximal humeral fracture dislocation.  Given the morphology of the fracture, I don’t believe ORIF is likely to result in a successful outcome due to risk pf nonunion  and a vascular necrosis     Shoulder arthroplasty would be his only option. This could be a reverse shoulder arthroplasty versus hemiarthroplasty. I believe reverse shoulder arthroplasty would provide a more predictable functional outcome.    This patient is  extremely high risk.   If he is neurovascular intact, I don’t believe there’s any emergent situation here. I will have my colleague evaluate his neurovascular status. Assuming it is acceptable, reverse shoulder arthroplasty can be done in a much more delayed fashion. It may be beneficial to have him done at a tertiary referral center like [facility].      Electronically signed by Dawood Bonner MD at 04/28/25 9623       Sanchez Schneider MD at 04/28/25 6731          Neurology Note    Patient:  Juan C Castro    YOB: 1970    REFERRING PHYSICIAN:  Dr. Alvares    CHIEF COMPLAINT:    seizure    HISTORY OF PRESENT ILLNESS:   The patient has not had any further seizures. IV Ativan changed to po diazepam. Found to have an acute right humeral head fracture, getting prn pain meds.    Past Medical History:  Past Medical History:   Diagnosis Date    Hyperlipidemia     Hypertension     Kidney disease        Past Surgical History:  Past Surgical History:   Procedure Laterality Date    COLONOSCOPY      KNEE ACL RECONSTRUCTION         Social History:   Social History     Socioeconomic History    Marital status: Single   Tobacco Use    Smoking status: Every Day     Current packs/day: 0.50     Average packs/day: 0.5 packs/day for 30.0 years (15.0 ttl pk-yrs)     Types: Cigarettes    Smokeless tobacco: Never   Vaping Use    Vaping status: Never Used   Substance and Sexual Activity    Alcohol use: Yes     Alcohol/week: 6.0 standard drinks of alcohol     Types: 6 Cans of beer per week    Drug use: Yes     Types: Marijuana        Family History:   Family History   Problem Relation Age of Onset    Diabetes Mother     Hypertension Mother     Heart attack Father     Heart attack Paternal Grandfather        Medications Prior to Admission:    Prior to Admission medications    Medication Sig Start Date End Date Taking? Authorizing Provider   Alcohol Swabs (Alcohol Pads) 70 % pads Apply one  alcohol swab to injection site of skin immediately prior to insulin injection. 4/28/25   Elmira Krueger APRN   aspirin 81 MG EC tablet Take 1 tablet by mouth Daily. 6/23/20   Elin Tolentino APRN   atorvastatin (LIPITOR) 20 MG tablet Take 1 tablet by mouth Daily. 7/8/22   Micaela Peter MD   glucose blood test strip Use 1 each to test blood glucose levels 4 (Four) Times a Day As Needed. 4/28/25   Elmira Krueger APRN   Blood Glucose Monitoring Suppl (Blood Glucose Monitor System) w/Device kit Use to test blood glucose levels 4 (Four) Times a Day As Needed. 4/28/25   Elmira Krueger APRN   Lancets misc Use 1 each to test blood glucose levels 4 (Four) Times a Day As Needed. 4/28/25   Elmira Krueger APRN   lisinopril (PRINIVIL,ZESTRIL) 30 MG tablet TAKE 1 TABLET BY MOUTH DAILY 10/3/22   Micaela Peter MD   LORazepam (Ativan) 0.5 MG tablet Take 1 tablet by mouth Every 8 (Eight) Hours As Needed for Anxiety. 1/21/22   Micaela Peter MD   omeprazole (priLOSEC) 40 MG capsule TAKE 1 CAPSULE BY MOUTH DAILY 8/15/22   Micaela Peter MD   ondansetron (ZOFRAN) 4 MG tablet Take 1 tablet by mouth Every 8 (Eight) Hours As Needed for Nausea or Vomiting.    Provider, MD Migue   sildenafil (Viagra) 25 MG tablet Take 1 tablet by mouth Daily As Needed for Erectile Dysfunction. 2/21/22   Micaela Peter MD       Allergies:  Patient has no known allergies.      Review of system  Review of Systems   Musculoskeletal:         Right shoulder pain.   All other systems reviewed and are negative.      Vitals:    04/28/25 1500   BP: (!) 186/96   Pulse: 92   Resp:    Temp:    SpO2: 93%       Physical exam  Physical Exam  Cardiovascular:      Rate and Rhythm: Normal rate and regular rhythm.   Pulmonary:      Effort: Pulmonary effort is normal.   Neurological:      General: No focal deficit present.      Mental Status: He is alert and oriented to person, place, and time.      Comments:  Somewhat anxious, speech clear, no facial droop, no tremor noted, moves limbs well.           Lab Results   Component Value Date    WBC 20.20 (H) 04/28/2025    HGB 18.0 (H) 04/28/2025    HCT 55.0 (H) 04/28/2025    MCV 99.8 (H) 04/28/2025     04/28/2025     Lab Results   Component Value Date    GLUCOSE 171 (H) 04/28/2025    BUN 11 04/28/2025    CREATININE 1.47 (H) 04/28/2025    EGFRIFNONA 61 07/02/2021    BCR 7.5 04/28/2025    CO2 17.0 (L) 04/28/2025    CALCIUM 8.0 (L) 04/28/2025    ALBUMIN 4.7 04/27/2025    AST 28 04/27/2025    ALT 34 04/27/2025         Radiological Studies:   EEG  Result Date: 4/28/2025  Reason for referral: 55 y.o.male with seizure Technical Summary:  A 19 channel digital EEG was performed using the international 10-20 placement system, including eye leads and EKG leads. Duration: 20 minutes Findings: The patient is awake.  A medium amplitude well-regulated 10 Hz posterior rhythm is present symmetrically and somewhat widespread over the posterior leads.  Intermixed theta and alpha activity is seen anteriorly.  Drowsiness and light sleep are seen with mild slowing of the tracing.  Photic stimulation does not change the background.  Hyperventilation is not performed. Video: Available Technical quality: Good Rhythm strip: Regular, 80 bpm SUMMARY: Normal EEG in the awake and lightly asleep states No focal features or epileptiform activity are seen     Normal study This report is transcribed using the Dragon dictation system.  [     MRI Brain With & Without Contrast  Result Date: 4/28/2025  MRI BRAIN W WO CONTRAST Date of Exam: 4/27/2025 11:14 PM EDT Indication: seizure.  Comparison: CT head 4/27/2025. Technique:  Routine multiplanar/multisequence sequence images of the brain were obtained before and after the uneventful administration of 20 mL Multihance. Findings: There is no diffusion restriction to suggest acute infarct. There is no evidence of acute or chronic intracranial hemorrhage. There  is a small focus of encephalomalacia and hemosiderin staining in the inferior left cerebellum, which likely corresponds to  a chronic lacunar infarct. No mass effect or midline shift. No abnormal extra-axial collections.  The major vascular flow voids appear intact. The basal ganglia, brainstem and cerebellum appear within normal limits.  Calvarial and superficial soft tissue signal is within normal limits.  Orbits appear unremarkable.  The paranasal sinuses and the mastoid air cells appear well aerated.  Midline structures are intact. No abnormal enhancement. No evidence of gray matter heterotopia. The hippocampus appears normal bilaterally. No evidence of mesial temporal sclerosis. No obvious cortical dysplasia or migrational abnormality.     Impression: 1.No acute intracranial abnormality. No epileptogenic focus identified. 2.Small chronic lacunar infarct in the inferior left cerebellum. Electronically Signed: Conrad Bruce MD  4/28/2025 1:32 AM EDT  Workstation ID: [id number]    XR Shoulder 2+ View Right  Result Date: 4/27/2025  XR SHOULDER 2+ VW RIGHT Date of Exam: 4/27/2025 6:00 PM EDT Indication: Shoulder pain Comparison: 4/27/2025 Findings: There is an acute comminuted fracture of the proximal humerus. The humeral head appears to be dislocated somewhat posteriorly. AC joint appears within normal limits. Visualized portion of the right ribs are within normal limits     Impression: Acute comminuted displaced fracture of the proximal humerus. Electronically Signed: Tyler Majano MD  4/27/2025 6:44 PM EDT  Workstation ID: [id number]    CT Head Without Contrast  Result Date: 4/27/2025  CT HEAD WO CONTRAST Date of Exam: 4/27/2025 2:17 PM EDT Indication: seizure. Comparison: None available. Technique: Axial CT images were obtained of the head without contrast administration.  Automated exposure control and iterative construction methods were used. Findings: There is no evidence of acute intracranial hemorrhage, mass,  midline shift, loss of gray-white matter differentiation, or extra-axial fluid collection.  There is normal ventricular volume. The basal cisterns are patent. No evidence of fracture.  The paranasal sinuses and mastoid air cells are predominantly well aerated. The orbits and included soft tissues show no acute abnormality.      Impression: No acute intracranial abnormality. Electronically Signed: Conrad Nicole MD  4/27/2025 2:56 PM EDT  Workstation ID: [id number]    XR Chest 1 View  Result Date: 4/27/2025  XR CHEST 1 VW Date of Exam: 4/27/2025 1:15 PM EDT Indication: Tachypnea Comparison: None available. Findings: The cardiomediastinal silhouette is within normal limits. Pulmonary vascularity appears normal. There is no focal airspace consolidation, pleural effusion, or pneumothorax. There are mild degenerative changes of the thoracic spine.     Impression: No acute cardiopulmonary abnormality. Electronically Signed: Sean Graf MD  4/27/2025 1:42 PM EDT  Workstation ID: [id number]        During this visit the following were done:  Labs Reviewed [x]    Labs Ordered []    Radiology Reports Reviewed [x]    Radiology Ordered []    EKG, echo, and/or stress test reviewed []    EEG results reviewed  [x]    EEG reviewed and interpreted per myself   []    Discussed case with neurointerventionalist or neuroradiologist []    Referring Provider Records Reviewed []    ER Records Reviewed []    Hospital Records Reviewed []    History Obtained From Family []    Radiological images view and Interpreted per myself []    Case Discussed with referring provider []     Decision to obtain and request outside records  []        Assessment and Plan     New-onset generalized tonic seizure in the setting of severe HTN, suspected DKA, sleep deprivation, daily alcohol and street Ativan use. MRI brain is negative for acute changes, EEG is normal.   - Continue diazepam, decrease dose as tolerated..   - Thiamine.   - CIWA.   - No driving for 90  days on discharge.  - Outpatient neurology F/U.    Call for questions, will see prn. Thanks.                 Electronically signed by Sanchez Schneider MD on 4/28/2025 at 15:31 EDT        Electronically signed by Sanchez Schneider MD at 04/29/25 0523          Consult Notes (last 24 hours)        Rey Nieto MD at 04/28/25 8691             Orthopedic Consult      Patient: Juan C Castro    Date of Admission: 4/27/2025 12:04 PM    YOB: 1970    Medical Record Number: [id number]    Attending Physician: Tre Shaikh MD    Consulting Physician: Rey Nieto MD      Chief Complaints: Seizure [R56.9]      History of Present Illness: 55 y.o. male admitted to [facility] with Seizure [R56.9].  presented to the emergency room with nausea rapid breathing chills and elevated blood pressure.  He also reports reduced appetite for the last 1 month.   He reports that he has been borderline diabetic.  In the emergency room he was noted to have significantly elevated blood pressure with witnessed seizure.    Patient was also noted to be in acute DKA.  Admitted to the ICU for further care.  He complained of right shoulder pain was noted to have a proximal humerus fracture.  I been consulted for further evaluation and treatment recommendations.     No Known Allergies     Home Medications:  Medications Prior to Admission   Medication Sig Dispense Refill Last Dose/Taking    aspirin 81 MG EC tablet Take 1 tablet by mouth Daily. 90 tablet 5     atorvastatin (LIPITOR) 20 MG tablet Take 1 tablet by mouth Daily. 90 tablet 0     lisinopril (PRINIVIL,ZESTRIL) 30 MG tablet TAKE 1 TABLET BY MOUTH DAILY 90 tablet 0     LORazepam (Ativan) 0.5 MG tablet Take 1 tablet by mouth Every 8 (Eight) Hours As Needed for Anxiety. 14 tablet 0     omeprazole (priLOSEC) 40 MG capsule TAKE 1 CAPSULE BY MOUTH DAILY 30 capsule 2     ondansetron (ZOFRAN) 4 MG tablet Take 1 tablet by mouth Every 8 (Eight) Hours As  Needed for Nausea or Vomiting.       sildenafil (Viagra) 25 MG tablet Take 1 tablet by mouth Daily As Needed for Erectile Dysfunction. 30 tablet 1          Past Medical History:   Diagnosis Date    Hyperlipidemia     Hypertension     Kidney disease         Past Surgical History:   Procedure Laterality Date    COLONOSCOPY      KNEE ACL RECONSTRUCTION          Social History     Occupational History    Not on file   Tobacco Use    Smoking status: Every Day     Current packs/day: 0.50     Average packs/day: 0.5 packs/day for 30.0 years (15.0 ttl pk-yrs)     Types: Cigarettes    Smokeless tobacco: Never   Vaping Use    Vaping status: Never Used   Substance and Sexual Activity    Alcohol use: Yes     Alcohol/week: 6.0 standard drinks of alcohol     Types: 6 Cans of beer per week    Drug use: Yes     Types: Marijuana    Sexual activity: Not on file      Social History     Social History Narrative    Not on file        Family History   Problem Relation Age of Onset    Diabetes Mother     Hypertension Mother     Heart attack Father     Heart attack Paternal Grandfather          Review of Systems:   As above    Physical Exam: 55 y.o. male  General Appearance:    Alert, cooperative, in no acute distress                   Vitals:    04/28/25 1900 04/28/25 2000 04/28/25 2100 04/28/25 2200   BP: 177/95 (!) 193/121 (!) 179/101 177/98   BP Location: Left arm Left arm Left arm Left arm   Patient Position: Lying Lying Lying Lying   Pulse: 93 88 90 90   Resp:  18 20    Temp:  98.3 °F (36.8 °C)     TempSrc:  Oral     SpO2: 92% 97% 93% 93%   Weight:       Height:            Head:    Normocephalic, without obvious abnormality, atraumatic      Right Upper Extremity: Right shoulder is swollen.  Skin is intact.  He has fairly significant diffuse tenderness palpation about the shoulder.  Unable to range the shoulder due to pain and known fracture.  With palpation about his elbow he has no tenderness palpation he is able to actively flex and  extend but has limited range of motion due to pain at the level of the shoulder.  He has sensation intact in the radial ulnar and median nerve distribution.  2+ radial pulse.  Able to fire the AIN, PIN, and ulnar nerve distributions.  He is warm and well-perfused.  Left Upper Extremity:  No obvious deformity, painless ROM shoulder, elbow, wrist  Right Lower Extremity:  No obvious deformity, painless ROM hip, knee, ankle, compartments soft, normal distal strength and sensation to light touch  Left Lower Extremity:  No obvious deformity, painless ROM hip, knee, ankle, compartments soft, normal distal strength and sensation to light touch x-rays         Diagnostic Tests:    I have reviewed the labs, radiology results and diagnostic studies:  X-rays and CT scan of the right shoulder are available for review.  They show a displaced and dislocated humeral head.    Results from last 7 days   Lab Units 04/28/25  0428   WBC 10*3/mm3 20.20*   HEMOGLOBIN g/dL 18.0*   PLATELETS 10*3/mm3 196     Results from last 7 days   Lab Units 04/28/25  1258 04/28/25  0850   SODIUM mmol/L  --  132*   POTASSIUM mmol/L 4.2 4.1   CO2 mmol/L  --  17.0*   CREATININE mg/dL  --  1.47*   GLUCOSE mg/dL  --  171*           Assessment:    Seizure    Essential hypertension    Mixed hyperlipidemia    Alcohol withdrawal    Closed fracture of proximal end of right humerus    Stage 3a chronic kidney disease    Type 2 diabetes mellitus      55-year-old with right proximal humerus fracture dislocation.  Given the complexity of this patient's fracture I did discuss this further with Dr. Bonner  for further management of this complex fracture.  He is left a note with his recommendations in the chart already.  He is neurovascularly intact therefore there is not a need for more urgent care at this time.  Plan to be for optimization of his medical conditions and surgical management in a more delayed fashion when appropriate either here or as stated in his note  possibly referral over to [facility].      Plan:   Analgesia as needed  Ice to the area as needed  Sling for comfort          Rey Nieto MD  04/28/25  22:54 EDT            Electronically signed by Rey Nieto MD at 04/28/25 [id number]

## 2025-04-29 NOTE — CASE MANAGEMENT/SOCIAL WORK
Continued Stay Note  [facility]     Patient Name: Juan C Castro  MRN: [id number]  Today's Date: 4/29/2025    Admit Date: 4/27/2025    Plan: Home with Family   Discharge Plan       Row Name 04/29/25 1604       Plan    Plan Comments Per MDR, Mr. Castro has requested addiction counseling.  Addiction team consulted earlier today. He will likely be transferred to telemetry floor soon. I have left messages with YARITZA Deluca RN with request to see Mr. Castro.  CM will cont to follow plan of care and provide assistance with discharge planning as recommendations become available.    Final Discharge Disposition Code 01 - home or self-care                 Hoda Mcpherson RN

## 2025-04-29 NOTE — CONSULTS
Orthopedic Consult      Patient: Juan C Castro    Date of Admission: 4/27/2025 12:04 PM    YOB: 1970    Medical Record Number: [id number]    Attending Physician: Tre Shaikh MD    Consulting Physician: Rey Nieto MD      Chief Complaints: Seizure [R56.9]      History of Present Illness: 55 y.o. male admitted to [facility] with Seizure [R56.9].  presented to the emergency room with nausea rapid breathing chills and elevated blood pressure.  He also reports reduced appetite for the last 1 month.   He reports that he has been borderline diabetic.  In the emergency room he was noted to have significantly elevated blood pressure with witnessed seizure.    Patient was also noted to be in acute DKA.  Admitted to the ICU for further care.  He complained of right shoulder pain was noted to have a proximal humerus fracture.  I been consulted for further evaluation and treatment recommendations.     No Known Allergies     Home Medications:  Medications Prior to Admission   Medication Sig Dispense Refill Last Dose/Taking    aspirin 81 MG EC tablet Take 1 tablet by mouth Daily. 90 tablet 5     atorvastatin (LIPITOR) 20 MG tablet Take 1 tablet by mouth Daily. 90 tablet 0     lisinopril (PRINIVIL,ZESTRIL) 30 MG tablet TAKE 1 TABLET BY MOUTH DAILY 90 tablet 0     LORazepam (Ativan) 0.5 MG tablet Take 1 tablet by mouth Every 8 (Eight) Hours As Needed for Anxiety. 14 tablet 0     omeprazole (priLOSEC) 40 MG capsule TAKE 1 CAPSULE BY MOUTH DAILY 30 capsule 2     ondansetron (ZOFRAN) 4 MG tablet Take 1 tablet by mouth Every 8 (Eight) Hours As Needed for Nausea or Vomiting.       sildenafil (Viagra) 25 MG tablet Take 1 tablet by mouth Daily As Needed for Erectile Dysfunction. 30 tablet 1          Past Medical History:   Diagnosis Date    Hyperlipidemia     Hypertension     Kidney disease         Past Surgical History:   Procedure Laterality Date    COLONOSCOPY      KNEE ACL RECONSTRUCTION           Social History     Occupational History    Not on file   Tobacco Use    Smoking status: Every Day     Current packs/day: 0.50     Average packs/day: 0.5 packs/day for 30.0 years (15.0 ttl pk-yrs)     Types: Cigarettes    Smokeless tobacco: Never   Vaping Use    Vaping status: Never Used   Substance and Sexual Activity    Alcohol use: Yes     Alcohol/week: 6.0 standard drinks of alcohol     Types: 6 Cans of beer per week    Drug use: Yes     Types: Marijuana    Sexual activity: Not on file      Social History     Social History Narrative    Not on file        Family History   Problem Relation Age of Onset    Diabetes Mother     Hypertension Mother     Heart attack Father     Heart attack Paternal Grandfather          Review of Systems:   As above    Physical Exam: 55 y.o. male  General Appearance:    Alert, cooperative, in no acute distress                   Vitals:    04/28/25 1900 04/28/25 2000 04/28/25 2100 04/28/25 2200   BP: 177/95 (!) 193/121 (!) 179/101 177/98   BP Location: Left arm Left arm Left arm Left arm   Patient Position: Lying Lying Lying Lying   Pulse: 93 88 90 90   Resp:  18 20    Temp:  98.3 °F (36.8 °C)     TempSrc:  Oral     SpO2: 92% 97% 93% 93%   Weight:       Height:            Head:    Normocephalic, without obvious abnormality, atraumatic      Right Upper Extremity: Right shoulder is swollen.  Skin is intact.  He has fairly significant diffuse tenderness palpation about the shoulder.  Unable to range the shoulder due to pain and known fracture.  With palpation about his elbow he has no tenderness palpation he is able to actively flex and extend but has limited range of motion due to pain at the level of the shoulder.  He has sensation intact in the radial ulnar and median nerve distribution.  2+ radial pulse.  Able to fire the AIN, PIN, and ulnar nerve distributions.  He is warm and well-perfused.  Left Upper Extremity:  No obvious deformity, painless ROM shoulder, elbow, wrist  Right  Lower Extremity:  No obvious deformity, painless ROM hip, knee, ankle, compartments soft, normal distal strength and sensation to light touch  Left Lower Extremity:  No obvious deformity, painless ROM hip, knee, ankle, compartments soft, normal distal strength and sensation to light touch x-rays         Diagnostic Tests:    I have reviewed the labs, radiology results and diagnostic studies:  X-rays and CT scan of the right shoulder are available for review.  They show a displaced and dislocated humeral head.    Results from last 7 days   Lab Units 04/28/25  0428   WBC 10*3/mm3 20.20*   HEMOGLOBIN g/dL 18.0*   PLATELETS 10*3/mm3 196     Results from last 7 days   Lab Units 04/28/25  1258 04/28/25  0850   SODIUM mmol/L  --  132*   POTASSIUM mmol/L 4.2 4.1   CO2 mmol/L  --  17.0*   CREATININE mg/dL  --  1.47*   GLUCOSE mg/dL  --  171*           Assessment:    Seizure    Essential hypertension    Mixed hyperlipidemia    Alcohol withdrawal    Closed fracture of proximal end of right humerus    Stage 3a chronic kidney disease    Type 2 diabetes mellitus      55-year-old with right proximal humerus fracture dislocation.  Given the complexity of this patient's fracture I did discuss this further with Dr. Bonner  for further management of this complex fracture.  He is left a note with his recommendations in the chart already.  He is neurovascularly intact therefore there is not a need for more urgent care at this time.  Plan to be for optimization of his medical conditions and surgical management in a more delayed fashion when appropriate either here or as stated in his note possibly referral over to [facility].      Plan:   Analgesia as needed  Ice to the area as needed  Sling for comfort          Rey Nieto MD  04/28/25  22:54 EDT

## 2025-04-29 NOTE — PLAN OF CARE
Goal Outcome Evaluation:           Progress: no change  Outcome Evaluation: .                      - A&Ox4.  - SpO2 dropped to mid-high 80's multiple times overnight with deep sleep, noted apneic pauses and snoring. Placed on 2LNC.   - PRN labetalol and scheduled Norvasc added for BP maintenance. Both given x1.  - Dilaudid PRN x4.  - Norco PRN x2.  - Ortho consult see note.  - Renal US overnight, see results.  - No insulin coverage needed overnight.  - Adequate UOP, no BM.

## 2025-04-29 NOTE — PROGRESS NOTES
Have reviewed this patient CT scan. I was asked by one of my practice partners DR Nieto to evaluate his imaging and formulate a treatment plan.    The patient has a comminuted proximal humeral fracture dislocation.  Given the morphology of the fracture, I don’t believe ORIF is likely to result in a successful outcome due to risk pf nonunion  and a vascular necrosis     Shoulder arthroplasty would be his only option. This could be a reverse shoulder arthroplasty versus hemiarthroplasty. I believe reverse shoulder arthroplasty would provide a more predictable functional outcome.    This patient is extremely high risk.   If he is neurovascular intact, I don’t believe there’s any emergent situation here. I will have my colleague evaluate his neurovascular status. Assuming it is acceptable, reverse shoulder arthroplasty can be done in a much more delayed fashion. It may be beneficial to have him done at a tertiary referral center like [facility].

## 2025-04-29 NOTE — PROGRESS NOTES
"   LOS: 2 days    Patient Care Team:  Provider, No Known as PCP - General    Chief Complaint:  Nausea, HTN and SOA    Subjective     Interval History:     No acute events overnight. No new complaints       Review of Systems:   No CP or SOA , fever, chills, rigors, rash, N/V, Constipation.       Objective     Vital Sign Min/Max for last 24 hours  Temp  Min: 97.4 °F (36.3 °C)  Max: 98.3 °F (36.8 °C)   BP  Min: 146/123  Max: 193/121   Pulse  Min: 73  Max: 107   Resp  Min: 16  Max: 20   SpO2  Min: 88 %  Max: 97 %   Flow (L/min) (Oxygen Therapy)  Min: 2  Max: 2   No data recorded     Flowsheet Rows      Flowsheet Row First Filed Value   Admission Height 182.9 cm (72\") Documented at 04/27/2025 1134   Admission Weight 102 kg (225 lb) Documented at 04/27/2025 1134            No intake/output data recorded.  I/O last 3 completed shifts:  In: 4714.7 [P.O.:716; I.V.:3548.7; IV Piggyback:450]  Out: 4580 [Urine:4580]    Physical Exam:    Gen: Alert, NAD   HENT: NC, AT, EOMI   NECK: Supple, no JVD, Trachea midline   LUNGS: CTA bilaterally, non labored respirtation   CVS: S1/S2 audible, RRR, no murmur   Abd: Soft, NT, ND, BS+   Ext: No pedal edema, no cyanosis   CNS: Alert, No focal deficit noted grossly  Psy: Cooperative  Skin: Warm, dry and intact      WBC WBC   Date Value Ref Range Status   04/29/2025 14.56 (H) 3.40 - 10.80 10*3/mm3 Final   04/28/2025 20.20 (H) 3.40 - 10.80 10*3/mm3 Final   04/27/2025 26.30 (H) 3.40 - 10.80 10*3/mm3 Final   04/27/2025 18.63 (H) 3.40 - 10.80 10*3/mm3 Final     Comment:     Modified report. Previous result was 18.82 10*3/mm3 on 4/27/2025 at 1258 EDT.      HGB Hemoglobin   Date Value Ref Range Status   04/29/2025 15.6 13.0 - 17.7 g/dL Final   04/28/2025 18.0 (H) 13.0 - 17.7 g/dL Final   04/27/2025 18.4 (H) 13.0 - 17.7 g/dL Final   04/27/2025 20.4 (H) 13.0 - 17.7 g/dL Final     Comment:     Modified report. Previous result was 20.0 g/dL on 4/27/2025 at 1258 EDT.      HCT Hematocrit   Date Value Ref " "Range Status   04/29/2025 44.3 37.5 - 51.0 % Final   04/28/2025 55.0 (H) 37.5 - 51.0 % Final   04/27/2025 51.5 (H) 37.5 - 51.0 % Final   04/27/2025 56.8 (H) 37.5 - 51.0 % Final     Comment:     Modified report. Previous result was 56.9 % on 4/27/2025 at 1258 EDT.      Platlets No results found for: \"LABPLAT\"   MCV MCV   Date Value Ref Range Status   04/29/2025 92.9 79.0 - 97.0 fL Final   04/28/2025 99.8 (H) 79.0 - 97.0 fL Final   04/27/2025 91.6 79.0 - 97.0 fL Final   04/27/2025 91.0 79.0 - 97.0 fL Final          Sodium Sodium   Date Value Ref Range Status   04/29/2025 132 (L) 136 - 145 mmol/L Final   04/28/2025 132 (L) 136 - 145 mmol/L Final   04/28/2025 125 (L) 136 - 145 mmol/L Final   04/28/2025 129 (L) 136 - 145 mmol/L Final   04/27/2025 127 (L) 136 - 145 mmol/L Final   04/27/2025 126 (L) 136 - 145 mmol/L Final   04/27/2025 125 (L) 136 - 145 mmol/L Final      Potassium Potassium   Date Value Ref Range Status   04/29/2025 4.6 3.5 - 5.2 mmol/L Final     Comment:     Slight hemolysis detected by analyzer. Result may be falsely elevated.   04/28/2025 4.2 3.5 - 5.2 mmol/L Final     Comment:     Slight hemolysis detected by analyzer. Result may be falsely elevated.   04/28/2025 4.1 3.5 - 5.2 mmol/L Final     Comment:     Slight hemolysis detected by analyzer. Result may be falsely elevated.   04/28/2025 4.1 3.5 - 5.2 mmol/L Final     Comment:     Slight hemolysis detected by analyzer. Result may be falsely elevated.   04/28/2025 3.5 3.5 - 5.2 mmol/L Final     Comment:     Slight hemolysis detected by analyzer. Result may be falsely elevated.   04/27/2025 3.9 3.5 - 5.2 mmol/L Final     Comment:     Slight hemolysis detected by analyzer. Result may be falsely elevated.   04/27/2025 3.9 3.5 - 5.2 mmol/L Final     Comment:     Slight hemolysis detected by analyzer. Result may be falsely elevated.   04/27/2025 3.9 3.5 - 5.2 mmol/L Final     Comment:     Slight hemolysis detected by analyzer. Result may be falsely elevated. " "     Chloride Chloride   Date Value Ref Range Status   04/29/2025 100 98 - 107 mmol/L Final   04/28/2025 103 98 - 107 mmol/L Final   04/28/2025 101 98 - 107 mmol/L Final   04/28/2025 101 98 - 107 mmol/L Final   04/27/2025 98 98 - 107 mmol/L Final   04/27/2025 94 (L) 98 - 107 mmol/L Final   04/27/2025 89 (L) 98 - 107 mmol/L Final      CO2 CO2   Date Value Ref Range Status   04/29/2025 21.0 (L) 22.0 - 29.0 mmol/L Final   04/28/2025 17.0 (L) 22.0 - 29.0 mmol/L Final   04/28/2025 11.0 (L) 22.0 - 29.0 mmol/L Final   04/28/2025 15.0 (L) 22.0 - 29.0 mmol/L Final   04/27/2025 16.0 (L) 22.0 - 29.0 mmol/L Final   04/27/2025 15.0 (L) 22.0 - 29.0 mmol/L Final   04/27/2025 17.0 (L) 22.0 - 29.0 mmol/L Final      BUN BUN   Date Value Ref Range Status   04/29/2025 11 6 - 20 mg/dL Final   04/28/2025 11 6 - 20 mg/dL Final   04/28/2025 11 6 - 20 mg/dL Final   04/28/2025 11 6 - 20 mg/dL Final   04/27/2025 10 6 - 20 mg/dL Final   04/27/2025 10 6 - 20 mg/dL Final   04/27/2025 9 6 - 20 mg/dL Final      Creatinine Creatinine   Date Value Ref Range Status   04/29/2025 1.19 0.76 - 1.27 mg/dL Final   04/28/2025 1.47 (H) 0.76 - 1.27 mg/dL Final   04/28/2025 1.43 (H) 0.76 - 1.27 mg/dL Final   04/28/2025 1.50 (H) 0.76 - 1.27 mg/dL Final   04/27/2025 1.30 (H) 0.76 - 1.27 mg/dL Final   04/27/2025 1.51 (H) 0.76 - 1.27 mg/dL Final   04/27/2025 1.16 0.76 - 1.27 mg/dL Final      Calcium Calcium   Date Value Ref Range Status   04/29/2025 8.3 (L) 8.6 - 10.5 mg/dL Final   04/28/2025 8.0 (L) 8.6 - 10.5 mg/dL Final   04/28/2025 7.7 (L) 8.6 - 10.5 mg/dL Final   04/28/2025 7.9 (L) 8.6 - 10.5 mg/dL Final   04/27/2025 7.7 (L) 8.6 - 10.5 mg/dL Final   04/27/2025 8.1 (L) 8.6 - 10.5 mg/dL Final   04/27/2025 9.1 8.6 - 10.5 mg/dL Final      PO4 No results found for: \"CAPO4\"   Albumin Albumin   Date Value Ref Range Status   04/27/2025 4.7 3.5 - 5.2 g/dL Final      Magnesium Magnesium   Date Value Ref Range Status   04/29/2025 2.3 1.6 - 2.6 mg/dL Final   04/28/2025 " 3.2 (H) 1.6 - 2.6 mg/dL Final   04/27/2025 1.5 (L) 1.6 - 2.6 mg/dL Final      Uric Acid Uric Acid   Date Value Ref Range Status   04/28/2025 5.8 3.4 - 7.0 mg/dL Final     Comment:     Falsely depressed results may occur on samples drawn from patients receiving N-Acetylcysteine (NAC) or Metamizole.        Results from last 7 days   Lab Units 04/29/25  0452 04/28/25  1258 04/28/25  0850 04/28/25  0428 04/28/25  0122 04/27/25  2107 04/27/25  1633 04/27/25  1230 04/27/25  1229   SODIUM mmol/L 132*  --  132* 125* 129*   < > 126*  --  125*   POTASSIUM mmol/L 4.6 4.2 4.1 4.1 3.5   < > 3.9  --  3.9   CHLORIDE mmol/L 100  --  103 101 101   < > 94*  --  89*   CO2 mmol/L 21.0*  --  17.0* 11.0* 15.0*   < > 15.0*  --  17.0*   BUN mg/dL 11  --  11 11 11   < > 10  --  9   CREATININE mg/dL 1.19  --  1.47* 1.43* 1.50*   < > 1.51*  --  1.16   CALCIUM mg/dL 8.3*  --  8.0* 7.7* 7.9*   < > 8.1*  --  9.1   ALBUMIN g/dL  --   --   --   --   --   --   --   --  4.7   WBC 10*3/mm3 14.56*  --   --  20.20*  --   --  26.30* 18.63*  --    HEMOGLOBIN g/dL 15.6  --   --  18.0*  --   --  18.4* 20.4*  --    PLATELETS 10*3/mm3 178  --   --  196  --   --  223 235  --    GLUCOSE mg/dL 181*  --  171* 169* 92   < > 309*  --  240*   PHOSPHORUS mg/dL 2.1*  --   --   --   --   --   --   --  1.8*    < > = values in this interval not displayed.              Results Review:     I reviewed the patient's new clinical results.    amLODIPine, 5 mg, Oral, Q24H  diazePAM, 10 mg, Oral, Q6H  folic acid 1 mg in sodium chloride 0.9 % 50 mL IVPB, 1 mg, Intravenous, Daily  insulin glargine, 10 Units, Subcutaneous, Daily  insulin lispro, 2-9 Units, Subcutaneous, 4x Daily AC & at Bedtime  lisinopril, 20 mg, Oral, Q24H  mupirocin, 1 Application, Each Nare, BID  senna-docusate sodium, 2 tablet, Oral, BID  sodium chloride, 10 mL, Intravenous, Q12H      niCARdipine, 5-15 mg/hr, Last Rate: Stopped (04/28/25 1150)  sodium bicarbonate 150 mEq in D5W, 60 mL/hr, Last Rate: 60 mL/hr  (04/28/25 2122)    Findings:  The right kidney measures 11.2 x 5.8 x 5.8 cm in length and the left kidney measures 11.9 x 6.3 x 5.2 cm in length     Kidneys demonstrate normal cortical echogenicity.  No hydronephrosis or intrarenal stones.  No focal lesions.     Bladder is incompletely distended. Minimal debris noted within the bladder.     IMPRESSION:     1. Unremarkable ultrasound of the kidneys.     2. Some minimal debris noted within the bladder. Please correlate with urinalys    Medication Review: yes    Assessment & Plan       Seizure    Essential hypertension    Mixed hyperlipidemia    Alcohol withdrawal    Closed fracture of proximal end of right humerus    Stage 3a chronic kidney disease    Type 2 diabetes mellitus      1- DAKSHA on CKD - Pre-renal azotemia vs DAKSHA secondary to Hypertensive crisis. UA + protein, glucose and RBC 3-5. Improving   2- CKD - baseline Scr 1.1-1.3 range. UPCR 0.21  3- Metabolic acidosis - improving.   4- Hyponatremia - corrected for hyperglycemia -134  5- Hypophosphatemia - improving.   6- HTN encephalopathy      Plan  - Continue with gentle hydration.   - Monitor I/O   - Avoid nephrotoxic agents.   - Renal diet   - Adjust meds per renal function   - No emergent need of RRT   - Monitor H/H and transfuse for Hgb less than 7.0   - Replete phosphorus and calcium per protocol.     Sushil Thornton MD  04/29/25  08:18 EDT

## 2025-04-29 NOTE — PROGRESS NOTES
Intensive Care Follow-up     Hospital:  LOS: 2 days   Mr. Juan C Castro, 55 y.o. male is followed for:   Seizure      Subjective   Interval History:  Alert and oriented. Very pleasant and conversant, sitting up in bed. NAD. Hemodynamically stable. No further seizures. Pain in right arm related to humerus fracture. ROS otherwise negative. Mother at bedside. All questions answered.      The patient's past medical, surgical and social history were reviewed and updated in Epic as appropriate.     Objective     Infusions:  sodium bicarbonate 150 mEq in D5W, 60 mL/hr, Last Rate: 60 mL/hr (04/28/25 2122)    Medications:  amLODIPine, 5 mg, Oral, Q24H  atorvastatin, 10 mg, Oral, Nightly  diazePAM, 10 mg, Oral, Q6H  folic acid 1 mg in sodium chloride 0.9 % 50 mL IVPB, 1 mg, Intravenous, Daily  insulin glargine, 10 Units, Subcutaneous, Daily  insulin lispro, 2-9 Units, Subcutaneous, 4x Daily AC & at Bedtime  lisinopril, 20 mg, Oral, Q24H  mupirocin, 1 Application, Each Nare, BID  pantoprazole, 40 mg, Oral, Q AM  senna-docusate sodium, 2 tablet, Oral, BID  sodium chloride, 10 mL, Intravenous, Q12H  sodium phosphate, 15 mmol, Intravenous, Once    I reviewed the patient's medications.    Vital Sign Min/Max for last 24 hours  Temp  Min: 97.4 °F (36.3 °C)  Max: 98.3 °F (36.8 °C)   BP  Min: 147/91  Max: 193/121   Pulse  Min: 72  Max: 101   Resp  Min: 16  Max: 20   SpO2  Min: 88 %  Max: 97 %   Flow (L/min) (Oxygen Therapy)  Min: 2  Max: 2       Input/Output for last 24 hour shift  04/28 0701 - 04/29 0700  In: 2505.7 [P.O.:716; I.V.:1339.7]  Out: 3930 [Urine:3930]       Physical Exam:  GENERAL: Patient lying in bed and conversant. No acute distress.   HEENT: Normocephalic and atraumatic. Trachea midline. PER. EOM WNL.   LUNGS: Chest rise of normal depth and symmetric. Lungs clear to auscultation bilaterally. No wheezes, rhonchi, or rales.   HEART: S1,S2 detected. Regular rate and rhythm. No rub, murmur, or gallop.    ABDOMEN: Soft, round, nondistended, and nontender. Bowel sounds present.   EXTREMITIES: No clubbing, edema, or cyanosis. Peripheral pulses present. Skin warm and dry. Right upper extremity difficult to move due to fracture. Pulses intact. Significant bruising and some swelling.   NEURO/PSYCH: Alert and oriented. Follows commands. Moves all extremities. No focal deficits.      Results from last 7 days   Lab Units 04/29/25  0452 04/28/25  0428 04/27/25  1633   WBC 10*3/mm3 14.56* 20.20* 26.30*   HEMOGLOBIN g/dL 15.6 18.0* 18.4*   PLATELETS 10*3/mm3 178 196 223     Results from last 7 days   Lab Units 04/29/25  0452 04/28/25  1258 04/28/25  0850 04/28/25  0428 04/27/25  1633 04/27/25  1229   SODIUM mmol/L 132*  --  132* 125*   < > 125*   POTASSIUM mmol/L 4.6 4.2 4.1 4.1   < > 3.9   CO2 mmol/L 21.0*  --  17.0* 11.0*   < > 17.0*   BUN mg/dL 11  --  11 11   < > 9   CREATININE mg/dL 1.19  --  1.47* 1.43*   < > 1.16   MAGNESIUM mg/dL 2.3  --   --  3.2*  --  1.5*   PHOSPHORUS mg/dL 2.1*  --   --   --   --  1.8*   GLUCOSE mg/dL 181*  --  171* 169*   < > 240*    < > = values in this interval not displayed.     Estimated Creatinine Clearance: 88.5 mL/min (by C-G formula based on SCr of 1.19 mg/dL).      I reviewed the patient's new clinical results.  I reviewed the patient's new imaging results/reports including actual images and agree with reports.     Imaging Results (Last 24 Hours)       Procedure Component Value Units Date/Time    US Renal Bilateral [[id number]] Collected: 04/29/25 0606     Updated: 04/29/25 0612    Narrative:      US RENAL BILATERAL    Date of Exam: 4/28/2025 11:24 PM EDT    Indication: DAKSHA.    Comparison: No comparisons available.    Technique: Grayscale and color Doppler ultrasound evaluation of the kidneys and urinary bladder was performed.        Findings:  The right kidney measures 11.2 x 5.8 x 5.8 cm in length and the left kidney measures 11.9 x 6.3 x 5.2 cm in length    Kidneys demonstrate normal  cortical echogenicity.  No hydronephrosis or intrarenal stones.  No focal lesions.    Bladder is incompletely distended. Minimal debris noted within the bladder.      Impression:        1. Unremarkable ultrasound of the kidneys.    2. Some minimal debris noted within the bladder. Please correlate with urinalysis.    Electronically Signed: Bk Torres MD    4/29/2025 6:08 AM EDT    Workstation ID: [id number]    CT Upper Extremity Right Without Contrast [[id number]] Collected: 04/28/25 1536     Updated: 04/28/25 1556    Narrative:      CT UPPER EXTREMITY RIGHT WO CONTRAST    Date of Exam: 4/28/2025 11:51 AM EDT    Indication: right proximal humerus fracture.    Comparison: 4/27/2025    Technique: Axial CT images were obtained of the right upper extremity without contrast administration.  Reconstructed coronal and sagittal images were also obtained. Automated exposure control and iterative construction methods were used.      Findings:  There is a comminuted fracture of the proximal humerus. Fracture appears to involve the surgical and anatomic necks and greater and lesser tuberosities. The primary humeral head component involving the majority of the articular surface is rotated   posteriorly relative to the humeral shaft, and dislocated from the glenoid articular surface. The humeral head articular surface projects posteriorly. There is impaction at the surgical neck with proximal migration of the humeral shaft. There is   comminution of the greater and lesser tuberosities which appear medially displaced relative to the expected locations. There are multiple small displaced fracture fragments, most notably at the anterior-lateral aspect at the level of the coracoid.    Mineralization appears grossly normal. Acromioclavicular alignment appears grossly maintained. There appear to be moderate degenerative changes at the acromioclavicular joint. There is some motion in the distal humerus. No other definite displaced    fracture is identified. Mineralization appears grossly normal.    There is suspected edema/hematoma in the surrounding soft tissues at the fracture site. Probable glenohumeral effusion. Rotator cuff tendons are not well visualized or evaluated on this exam. No significant muscle atrophy.      Impression:      Impression:  1.Comminuted fracture of the proximal humerus.  2.The humeral head articular surface is rotated posteriorly and dislocated from the glenoid articular surface.  3.Impaction at the surgical neck with proximal migration of the humeral shaft.  4.Comminution of the greater and lesser tuberosities which appear medially displaced.            Electronically Signed: Horacio Lopez    4/28/2025 3:53 PM EDT    Workstation ID: [id number]          Assessment & Plan   Impression      Seizure    Essential hypertension    Mixed hyperlipidemia    Alcohol withdrawal    Closed fracture of proximal end of right humerus    Stage 3a chronic kidney disease    Type 2 diabetes mellitus       Plan        Juan C Castro is a 55 y.o. male with PMHx T2DM, HTN, dyslipidemia, CKD, alcohol use, and prior substance abuse  who presented to [facility] ED on 4/27/25 for evaluation of hypertension, nausea, chills, and tachypnea. In the ED, he was found to have significantly elevated blood pressure and had a witnessed seizure. He became combative in the ED, requiring physical restraint. Patient had subsequent pain and bruising on right upper extremity. CXR and CT upper extremity showed comminuted proximal humeral fracture. Neurology was consulted and he was treated with scheduled Ativan. He was also found to be in DKA and started on insulin.  He was admitted to the ICU for further care.     For seizure: Neurology following. MRI brain negative for acute changes. EEG normal. Seizure precautions. Continue scheduled diazepam and PRN lorazepam.  For hypertension: Nicardipine weaned off. Lisinopril 20 mg daily and amlodipine 5 mg daily  started 4/28/25. Will increase dose as necessary.  For dyslipidemia: Start stain therapy  For closed fracture of proximal end of right humerus: Orthopedic surgery consulted. Given the morphology of the fracture, shoulder arthroplasty was recommended. He was found to be neurovascularly intact; therefore, it was determined to be nonemergent and is to be completed at [facility]. Ice and sling for comfort. Increase PO pain medications.   For DAKSHA on CKD: Nephrology following. On bicarbonate drip for systemic acidosis (improving). Renal ultrasound completed this morning WNL. Minimal debris noted within the bladder. Urinalysis on 4/27/25 with 3-5 WBC. Patient asymptomatic. Follow up culture.  For alcohol withdrawal: Continue scheduled diazepam, thiamine, folic acid. Monitor CIWA scores. PRN lorazepam. Patient requested addictionology consult. Order added.  For T2DM: Hgb A1c 6.60 on 4/27/25. DKA resolved. Diabetes education saw patient. Will need diabetic testing equipment prescribed on discharge. AccuChecks ACHS, SSI.     DVT Prophylaxis: Mechanical  GI Prophylaxis: PPI  Dispo: Transfer to telemetry    Critical Care time spent in direct patient care: 35 minutes (excluding procedure time, if applicable) including high complexity decision making to assess, manipulate, and support vital organ system failure in this individual who has impairment of one or more vital organ systems such that there is a high probability of imminent or life threatening deterioration in the patient's condition.     Brenda Villalpando, MSN, APRN, ACNPC-AG  Pulmonary and Critical Care Medicine  Electronically signed by REINIER Gong, 04/29/25, 9:19 AM EDT.

## 2025-04-30 LAB
GLUCOSE BLDC GLUCOMTR-MCNC: 114 MG/DL (ref 70–130)
GLUCOSE BLDC GLUCOMTR-MCNC: 121 MG/DL (ref 70–130)
GLUCOSE BLDC GLUCOMTR-MCNC: 123 MG/DL (ref 70–130)
GLUCOSE BLDC GLUCOMTR-MCNC: 129 MG/DL (ref 70–130)

## 2025-04-30 PROCEDURE — 25010000002 HYDROMORPHONE PER 4 MG: Performed by: PSYCHIATRY & NEUROLOGY

## 2025-04-30 PROCEDURE — 99233 SBSQ HOSP IP/OBS HIGH 50: CPT | Performed by: INTERNAL MEDICINE

## 2025-04-30 PROCEDURE — 82948 REAGENT STRIP/BLOOD GLUCOSE: CPT

## 2025-04-30 PROCEDURE — 63710000001 INSULIN GLARGINE PER 5 UNITS: Performed by: INTERNAL MEDICINE

## 2025-04-30 PROCEDURE — 25010000002 FOLIC ACID 5 MG/ML SOLUTION 10 ML VIAL

## 2025-04-30 RX ORDER — AMLODIPINE BESYLATE 10 MG/1
10 TABLET ORAL
Status: DISCONTINUED | OUTPATIENT
Start: 2025-05-01 | End: 2025-05-02 | Stop reason: HOSPADM

## 2025-04-30 RX ORDER — AMLODIPINE BESYLATE 5 MG/1
5 TABLET ORAL ONCE
Status: COMPLETED | OUTPATIENT
Start: 2025-04-30 | End: 2025-04-30

## 2025-04-30 RX ADMIN — MUPIROCIN 1 APPLICATION: 20 OINTMENT TOPICAL at 20:20

## 2025-04-30 RX ADMIN — MUPIROCIN 1 APPLICATION: 20 OINTMENT TOPICAL at 09:20

## 2025-04-30 RX ADMIN — PANTOPRAZOLE SODIUM 40 MG: 40 TABLET, DELAYED RELEASE ORAL at 06:29

## 2025-04-30 RX ADMIN — HYDROMORPHONE HYDROCHLORIDE 0.5 MG: 1 INJECTION, SOLUTION INTRAMUSCULAR; INTRAVENOUS; SUBCUTANEOUS at 18:36

## 2025-04-30 RX ADMIN — DIAZEPAM 10 MG: 10 TABLET ORAL at 04:12

## 2025-04-30 RX ADMIN — HYDROMORPHONE HYDROCHLORIDE 0.5 MG: 1 INJECTION, SOLUTION INTRAMUSCULAR; INTRAVENOUS; SUBCUTANEOUS at 13:58

## 2025-04-30 RX ADMIN — DIAZEPAM 10 MG: 10 TABLET ORAL at 16:06

## 2025-04-30 RX ADMIN — LISINOPRIL 20 MG: 20 TABLET ORAL at 09:18

## 2025-04-30 RX ADMIN — HYDROMORPHONE HYDROCHLORIDE 0.5 MG: 1 INJECTION, SOLUTION INTRAMUSCULAR; INTRAVENOUS; SUBCUTANEOUS at 09:18

## 2025-04-30 RX ADMIN — HYDROCODONE BITARTRATE AND ACETAMINOPHEN 1 TABLET: 7.5; 325 TABLET ORAL at 06:28

## 2025-04-30 RX ADMIN — DIAZEPAM 10 MG: 10 TABLET ORAL at 09:42

## 2025-04-30 RX ADMIN — Medication 20 MG: at 22:20

## 2025-04-30 RX ADMIN — DIAZEPAM 10 MG: 10 TABLET ORAL at 22:20

## 2025-04-30 RX ADMIN — INSULIN GLARGINE 10 UNITS: 100 INJECTION, SOLUTION SUBCUTANEOUS at 09:26

## 2025-04-30 RX ADMIN — HYDROMORPHONE HYDROCHLORIDE 0.5 MG: 1 INJECTION, SOLUTION INTRAMUSCULAR; INTRAVENOUS; SUBCUTANEOUS at 04:12

## 2025-04-30 RX ADMIN — HYDROMORPHONE HYDROCHLORIDE 0.5 MG: 1 INJECTION, SOLUTION INTRAMUSCULAR; INTRAVENOUS; SUBCUTANEOUS at 22:34

## 2025-04-30 RX ADMIN — ATORVASTATIN CALCIUM 10 MG: 10 TABLET, FILM COATED ORAL at 20:20

## 2025-04-30 RX ADMIN — Medication 10 ML: at 20:20

## 2025-04-30 RX ADMIN — AMLODIPINE BESYLATE 5 MG: 5 TABLET ORAL at 16:06

## 2025-04-30 RX ADMIN — AMLODIPINE BESYLATE 5 MG: 5 TABLET ORAL at 09:18

## 2025-04-30 RX ADMIN — FOLIC ACID 1 MG: 5 INJECTION, SOLUTION INTRAMUSCULAR; INTRAVENOUS; SUBCUTANEOUS at 09:26

## 2025-04-30 RX ADMIN — Medication 10 ML: at 09:25

## 2025-04-30 RX ADMIN — HYDRALAZINE HYDROCHLORIDE 25 MG: 25 TABLET ORAL at 20:20

## 2025-04-30 NOTE — PAYOR COMM NOTE
"Juan C Castro (55 y.o. Male)       Date of Birth   1970    Social Security Number       Address   [address] [place], [place] [address] [facility] [address]    Home Phone   [phone]    MRN   [id number]       [facility]    Marital Status   Single                            Admission Date   4/27/2025    Admission Type   Emergency    Admitting Provider   Woody Mccullough MD    Attending Provider   Woody Mccullough MD    Department, Room/Bed   [facility] 3F, S317/1       Discharge Date       Discharge Disposition       Discharge Destination                                 Attending Provider: Woody Mccullough MD    Allergies: No Known Allergies    Isolation: None   Infection: None   Code Status: CPR    Ht: 182.9 cm (72\")   Wt: 104 kg (229 lb 9.6 oz)    Admission Cmt: None   Principal Problem: Seizure [R56.9]                   Active Insurance as of 4/27/2025       Primary Coverage       Payor Plan Insurance Group Employer/Plan Group    [employer] BLUE [employer]OSS BLUE [employer]IELD PPO [id number]       Payor Plan Address Payor Plan Phone Number Payor Plan Fax Number Effective Dates    [address] [phone]  9/1/2020 - None Entered    [place] [address]         Subscriber Name Subscriber Birth Date Member ID       JUAN C CASTRO 1970 [id number]                     Emergency Contacts        (Rel.) Home Phone Work Phone Mobile Phone    Jocelyne Castro (Mother) -- -- [phone]    Albert Dennison (Significant Other) -- -- [phone]              Yu: KRISTEL [id number] Tax ID [id number]  Insurance Information                  ANTHEM BLUE CROSS/[employer]HE[employer] BLUE CROSS BLUE SHIELD PPO Phone: [phone]    Subscriber: Juan C Castro Subscriber#: [id number]    Group#: [id number] Precert#: [id number]    Authorization#: [id number] Effective Date: --          Current Facility-Administered Medications   Medication Dose Route Frequency Provider Last " Rate Last Admin    amLODIPine (NORVASC) tablet 5 mg  5 mg Oral Q24H PremadysonleMatheus, RPH   5 mg at 04/30/25 0918    atorvastatin (LIPITOR) tablet 10 mg  10 mg Oral Nightly Kandy Villalpando APRN   10 mg at 04/29/25 2009    sennosides-docusate (PERICOLACE) 8.6-50 MG per tablet 2 tablet  2 tablet Oral BID Tre Shaikh MD        And    polyethylene glycol (MIRALAX) packet 17 g  17 g Oral Daily PRN Tre Shaikh MD        And    bisacodyl (DULCOLAX) EC tablet 5 mg  5 mg Oral Daily PRN Tre Shaikh MD        And    bisacodyl (DULCOLAX) suppository 10 mg  10 mg Rectal Daily PRN Tre Shaikh MD        dextrose (D50W) (25 g/50 mL) IV injection 25 g  25 g Intravenous Q15 Min PRN PreMatheus govea, RPH        dextrose (GLUTOSE) oral gel 15 g  15 g Oral Q15 Min PRN PrebbleMatheus, RPH        diazePAM (VALIUM) tablet 10 mg  10 mg Oral Q6H Juan C Alvares, DO   10 mg at 04/30/25 0942    folic acid 1 mg in sodium chloride 0.9 % 50 mL IVPB  1 mg Intravenous Daily Elmira Krueger, APRN 100 mL/hr at 04/30/25 0926 1 mg at 04/30/25 0926    glucagon (GLUCAGEN) injection 1 mg  1 mg Intramuscular Q15 Min PRN Matheus Scanlon, RPH        hydrALAZINE (APRESOLINE) tablet 25 mg  25 mg Oral Q8H PRN Juan C Alvares, DO        HYDROcodone-acetaminophen (NORCO) 7.5-325 MG per tablet 1 tablet  1 tablet Oral Q6H PRN Juan C Alvares, DO   1 tablet at 04/30/25 0628    HYDROmorphone (DILAUDID) injection 0.5 mg  0.5 mg Intravenous Q2H PRN Sanchez Schneider MD   0.5 mg at 04/30/25 0918    insulin glargine (LANTUS, SEMGLEE) injection 10 Units  10 Units Subcutaneous Daily Juan C Alvares, DO   10 Units at 04/30/25 0926    Insulin Lispro (humaLOG) injection 2-9 Units  2-9 Units Subcutaneous 4x Daily AC & at Bedtime Juan C Alvares, DO   2 Units at 04/29/25 2142    ipratropium-albuterol (DUO-NEB) nebulizer solution 3 mL  3 mL Nebulization Q4H PRN Riaz Guzmán, APRN         labetalol (NORMODYNE,TRANDATE) injection 20 mg  20 mg Intravenous Q4H PRN Riaz Guzmán APRN   20 mg at 04/29/25 0305    lisinopril (PRINIVIL,ZESTRIL) tablet 20 mg  20 mg Oral Q24H Juan C Alvarse, DO   20 mg at 04/30/25 0918    LORazepam (ATIVAN) injection 1 mg  1 mg Intravenous Q4H PRN Tre Shaikh MD        LORazepam (ATIVAN) injection 1 mg  1 mg Intravenous Q8H PRN Juan C Alvares DO        mupirocin (BACTROBAN) 2 % nasal ointment 1 Application  1 Application Each Nare BID Tre Shaikh MD   1 Application at 04/30/25 0920    nitroglycerin (NITROSTAT) SL tablet 0.4 mg  0.4 mg Sublingual Q5 Min PRN Tre Shaikh MD        pantoprazole (PROTONIX) EC tablet 40 mg  40 mg Oral Q AM Kandy Villalpando APRN   40 mg at 04/30/25 0629    Phosphorus Replacement - Follow Nurse / BPA Driven Protocol   Not Applicable PRN Juan C Alvares DO        Sodium Chloride (PF) 0.9 % 10 mL  10 mL Intravenous PRN Alfred Back MD        sodium chloride 0.9 % flush 10 mL  10 mL Intravenous Q12H Tre Shaikh MD   10 mL at 04/30/25 0925    sodium chloride 0.9 % flush 10 mL  10 mL Intravenous PRN Tre Shaikh MD        sodium chloride 0.9 % infusion 40 mL  40 mL Intravenous PRN Tre Shaikh MD         Lab Results (last 24 hours)       Procedure Component Value Units Date/Time    POC Glucose Once [[id number]]  (Normal) Collected: 04/30/25 1136    Specimen: Blood Updated: 04/30/25 1138     Glucose 114 mg/dL     POC Glucose Once [[id number]]  (Normal) Collected: 04/30/25 0741    Specimen: Blood Updated: 04/30/25 0744     Glucose 123 mg/dL     POC Glucose Once [[id number]]  (Abnormal) Collected: 04/29/25 2141    Specimen: Blood Updated: 04/29/25 2149     Glucose 156 mg/dL     POC Glucose Once [[id number]]  (Normal) Collected: 04/29/25 1632    Specimen: Blood Updated: 04/29/25 1633     Glucose 122 mg/dL     Blood Culture - Blood, Arm, Left [[id number]]  (Normal) Collected: 04/27/25 8574     Specimen: Blood from Arm, Left Updated: 04/29/25 1615     Blood Culture No growth at 2 days    Blood Culture - Blood, Hand, Right [[id number]]  (Normal) Collected: 04/27/25 1550    Specimen: Blood from Hand, Right Updated: 04/29/25 1615     Blood Culture No growth at 2 days    Urinalysis With Culture If Indicated - Urine, Clean Catch [[id number]]  (Abnormal) Collected: 04/28/25 1609    Specimen: Urine, Clean Catch Updated: 04/29/25 1155     Color, UA Yellow     Appearance, UA Cloudy     pH, UA 6.0     Specific Gravity, UA 1.010     Glucose,  mg/dL (1+)     Ketones, UA Negative     Bilirubin, UA Negative     Blood, UA Trace     Protein, UA Negative     Leuk Esterase, UA Negative     Nitrite, UA Negative     Urobilinogen, UA 0.2 E.U./dL    Narrative:      In absence of clinical symptoms, the presence of pyuria, bacteria, and/or nitrites on the urinalysis result does not correlate with infection.    Urinalysis, Microscopic Only - Urine, Clean Catch [[id number]] Collected: 04/28/25 1609    Specimen: Urine, Clean Catch Updated: 04/29/25 1155     RBC, UA 0-2 /HPF      WBC, UA 0-2 /HPF      Comment: Urine culture not indicated.        Bacteria, UA None Seen /HPF      Squamous Epithelial Cells, UA 0-2 /HPF      Hyaline Casts, UA 0-6 /LPF      Methodology Automated Microscopy          Imaging Results (Last 24 Hours)       ** No results found for the last 24 hours. **          Orders (last 24 hrs)        Start     Ordered    04/30/25 1139  POC Glucose Once  PROCEDURE ONCE        Comments: Complete no more than 45 minutes prior to patient eating      04/30/25 1136    04/30/25 0745  POC Glucose Once  PROCEDURE ONCE        Comments: Complete no more than 45 minutes prior to patient eating      04/30/25 0741    04/30/25 0600  Comprehensive Metabolic Panel  Morning Draw         04/29/25 1032    04/30/25 0600  Magnesium  Morning Draw         04/29/25 1032    04/30/25 0600  Phosphorus  Morning Draw         04/29/25 1032     04/30/25 0600  CBC & Differential  Morning Draw         04/29/25 1032    04/30/25 0600  CBC Auto Differential  PROCEDURE ONCE         04/29/25 2202    04/29/25 2150  POC Glucose Once  PROCEDURE ONCE        Comments: Complete no more than 45 minutes prior to patient eating      04/29/25 2141    04/29/25 2100  atorvastatin (LIPITOR) tablet 10 mg  Nightly         04/29/25 1004    04/29/25 1932  Phosphorus  Timed         04/29/25 0931    04/29/25 1802  DIET MESSAGE Patient did not get dinner- please send tray. He wants a chicken sandwich and salad with ranch. thanks  Once        Comments: Patient did not get dinner- please send tray. He wants a chicken sandwich and salad with ranch. thanks    04/29/25 1802    04/29/25 1634  POC Glucose Once  PROCEDURE ONCE        Comments: Complete no more than 45 minutes prior to patient eating      04/29/25 1632    04/29/25 1115  pantoprazole (PROTONIX) EC tablet 40 mg  Every Early Morning         04/29/25 1021    04/29/25 1030  sodium phosphates 15 mmol in sodium chloride 0.9 % 250 mL infusion  Once         04/29/25 0931    04/29/25 0937  amLODIPine (NORVASC) tablet 5 mg  Every 24 Hours Scheduled         04/29/25 0937    04/29/25 0921  HYDROcodone-acetaminophen (NORCO) 7.5-325 MG per tablet 1 tablet  Every 6 Hours PRN         04/29/25 0922    04/29/25 0913  Phosphorus Replacement - Follow Nurse / BPA Driven Protocol  As Needed         04/29/25 0916    04/28/25 2104  labetalol (NORMODYNE,TRANDATE) injection 20 mg  Every 4 Hours PRN         04/28/25 2104    04/28/25 2103  ipratropium-albuterol (DUO-NEB) nebulizer solution 3 mL  Every 4 Hours PRN         04/28/25 2104    04/28/25 1900  sodium bicarbonate 150 mEq/1000 mL D5W infusion  Continuous         04/28/25 1749    04/28/25 1659  HYDROmorphone (DILAUDID) injection 0.5 mg  Every 2 Hours PRN         04/28/25 1659    04/28/25 1440  hydrALAZINE (APRESOLINE) tablet 25 mg  Every 8 Hours PRN         04/28/25 1441    04/28/25 1223  dextrose  "(GLUTOSE) oral gel 15 g  Every 15 Minutes PRN         04/28/25 1223    04/28/25 1223  dextrose (D50W) (25 g/50 mL) IV injection 25 g  Every 15 Minutes PRN         04/28/25 1223    04/28/25 1223  glucagon (GLUCAGEN) injection 1 mg  Every 15 Minutes PRN         04/28/25 1223    04/28/25 1130  Insulin Lispro (humaLOG) injection 2-9 Units  4 Times Daily Before Meals & Nightly         04/28/25 0941    04/28/25 1100  POC Glucose 4x Daily Before Meals & at Bedtime  4 Times Daily Before Meals & at Bedtime      Comments: Complete no more than 45 minutes prior to patient eating      04/28/25 0941    04/28/25 1030  insulin glargine (LANTUS, SEMGLEE) injection 10 Units  Daily         04/28/25 0941    04/28/25 1030  diazePAM (VALIUM) tablet 10 mg  Every 6 Hours         04/28/25 0941    04/28/25 1030  lisinopril (PRINIVIL,ZESTRIL) tablet 20 mg  Every 24 Hours Scheduled         04/28/25 0941    04/28/25 0945  LORazepam (ATIVAN) injection 1 mg  Every 8 Hours PRN         04/28/25 0941    04/28/25 0000  Blood Glucose Monitoring Suppl (Blood Glucose Monitor System) w/Device kit  4 Times Daily PRN         04/28/25 1314    04/28/25 0000  glucose blood test strip  4 Times Daily PRN         04/28/25 1314    04/28/25 0000  Lancets misc  4 Times Daily PRN         04/28/25 1314    04/28/25 0000  Alcohol Swabs (Alcohol Pads) 70 % pads  4 Times Daily PRN         04/28/25 1314    04/27/25 2100  sodium chloride 0.9 % flush 10 mL  Every 12 Hours Scheduled         04/27/25 1556    04/27/25 2100  mupirocin (BACTROBAN) 2 % nasal ointment 1 Application  2 Times Daily         04/27/25 1556    04/27/25 2100  sennosides-docusate (PERICOLACE) 8.6-50 MG per tablet 2 tablet  2 Times Daily        Placed in \"And\" Linked Group    04/27/25 1556    04/27/25 1830  folic acid 1 mg in sodium chloride 0.9 % 50 mL IVPB  Daily         04/27/25 1733    04/27/25 1623  LORazepam (ATIVAN) injection 1 mg  Every 4 Hours PRN         04/27/25 1623    04/27/25 1600  Vital " "Signs Every Hour and Per Hospital Policy Based on Patient Condition  Every Hour       04/27/25 1556    04/27/25 1600  Intake & Output  Every Hour       04/27/25 1556    04/27/25 1557  Daily Weights  Daily       04/27/25 1556    04/27/25 1557  Daily CHG Bath While in Critical Care  Daily      Comments: Use for admission bath and length of critical care stay.    04/27/25 1556    04/27/25 1556  Oral Care - Patient Not on NPPV & Not Intubated  Every Shift       04/27/25 1556    04/27/25 1555  nitroglycerin (NITROSTAT) SL tablet 0.4 mg  Every 5 Minutes PRN         04/27/25 1556    04/27/25 1555  sodium chloride 0.9 % flush 10 mL  As Needed         04/27/25 1556    04/27/25 1555  sodium chloride 0.9 % infusion 40 mL  As Needed         04/27/25 1556    04/27/25 1555  polyethylene glycol (MIRALAX) packet 17 g  Daily PRN        Placed in \"And\" Linked Group    04/27/25 1556    04/27/25 1555  bisacodyl (DULCOLAX) EC tablet 5 mg  Daily PRN        Placed in \"And\" Linked Group    04/27/25 1556    04/27/25 1555  bisacodyl (DULCOLAX) suppository 10 mg  Daily PRN        Placed in \"And\" Linked Group    04/27/25 1556    04/27/25 1141  Sodium Chloride (PF) 0.9 % 10 mL  As Needed         04/27/25 1142    Unscheduled  Obtain Pre & Post Sedation Scores With Every Sedative Dose - Hold For POSS Greater Than 2 or RASS of -3 or Less  As Needed       04/27/25 1733                  Physician Progress Notes (last 24 hours)  Notes from 04/29/25 1154 through 04/30/25 1154   No notes of this type exist for this encounter.       Consult Notes (last 24 hours)  Notes from 04/29/25 1154 through 04/30/25 1154   No notes of this type exist for this encounter.       "

## 2025-04-30 NOTE — PROGRESS NOTES
"   LOS: 3 days    Patient Care Team:  Provider, No Known as PCP - General    Chief Complaint: Nausea, HTN and SOA    Subjective     Interval History:     No acute events overnight. No new complaints. No new labs.       Review of Systems:         Objective     Vital Sign Min/Max for last 24 hours  Temp  Min: 98.1 °F (36.7 °C)  Max: 98.7 °F (37.1 °C)   BP  Min: 156/84  Max: 186/100   Pulse  Min: 73  Max: 85   Resp  Min: 18  Max: 20   SpO2  Min: 92 %  Max: 96 %   No data recorded   Weight  Min: 104 kg (229 lb 9.6 oz)  Max: 104 kg (229 lb 9.6 oz)     Flowsheet Rows      Flowsheet Row First Filed Value   Admission Height 182.9 cm (72\") Documented at 04/27/2025 1134   Admission Weight 102 kg (225 lb) Documented at 04/27/2025 1134            I/O this shift:  In: -   Out: 1560 [Urine:1560]  I/O last 3 completed shifts:  In: 3058.5 [P.O.:1530; I.V.:1228.5; IV Piggyback:300]  Out: 6790 [Urine:6790]    Physical Exam:    Gen: Alert, NAD   HENT: NC, AT, EOMI   NECK: Supple, no JVD, Trachea midline   LUNGS: CTA bilaterally, non labored respirtation   CVS: S1/S2 audible, RRR, no murmur   Abd: Soft, NT, ND, BS+   Ext: No pedal edema, no cyanosis   CNS: Alert, No focal deficit noted grossly  Psy: Cooperative  Skin: Warm, dry and intact      WBC WBC   Date Value Ref Range Status   04/29/2025 14.56 (H) 3.40 - 10.80 10*3/mm3 Final   04/28/2025 20.20 (H) 3.40 - 10.80 10*3/mm3 Final      HGB Hemoglobin   Date Value Ref Range Status   04/29/2025 15.6 13.0 - 17.7 g/dL Final   04/28/2025 18.0 (H) 13.0 - 17.7 g/dL Final      HCT Hematocrit   Date Value Ref Range Status   04/29/2025 44.3 37.5 - 51.0 % Final   04/28/2025 55.0 (H) 37.5 - 51.0 % Final      Platlets No results found for: \"LABPLAT\"   MCV MCV   Date Value Ref Range Status   04/29/2025 92.9 79.0 - 97.0 fL Final   04/28/2025 99.8 (H) 79.0 - 97.0 fL Final          Sodium Sodium   Date Value Ref Range Status   04/29/2025 132 (L) 136 - 145 mmol/L Final   04/28/2025 132 (L) 136 - 145 " mmol/L Final   04/28/2025 125 (L) 136 - 145 mmol/L Final   04/28/2025 129 (L) 136 - 145 mmol/L Final   04/27/2025 127 (L) 136 - 145 mmol/L Final      Potassium Potassium   Date Value Ref Range Status   04/29/2025 4.6 3.5 - 5.2 mmol/L Final     Comment:     Slight hemolysis detected by analyzer. Result may be falsely elevated.   04/28/2025 4.2 3.5 - 5.2 mmol/L Final     Comment:     Slight hemolysis detected by analyzer. Result may be falsely elevated.   04/28/2025 4.1 3.5 - 5.2 mmol/L Final     Comment:     Slight hemolysis detected by analyzer. Result may be falsely elevated.   04/28/2025 4.1 3.5 - 5.2 mmol/L Final     Comment:     Slight hemolysis detected by analyzer. Result may be falsely elevated.   04/28/2025 3.5 3.5 - 5.2 mmol/L Final     Comment:     Slight hemolysis detected by analyzer. Result may be falsely elevated.   04/27/2025 3.9 3.5 - 5.2 mmol/L Final     Comment:     Slight hemolysis detected by analyzer. Result may be falsely elevated.      Chloride Chloride   Date Value Ref Range Status   04/29/2025 100 98 - 107 mmol/L Final   04/28/2025 103 98 - 107 mmol/L Final   04/28/2025 101 98 - 107 mmol/L Final   04/28/2025 101 98 - 107 mmol/L Final   04/27/2025 98 98 - 107 mmol/L Final      CO2 CO2   Date Value Ref Range Status   04/29/2025 21.0 (L) 22.0 - 29.0 mmol/L Final   04/28/2025 17.0 (L) 22.0 - 29.0 mmol/L Final   04/28/2025 11.0 (L) 22.0 - 29.0 mmol/L Final   04/28/2025 15.0 (L) 22.0 - 29.0 mmol/L Final   04/27/2025 16.0 (L) 22.0 - 29.0 mmol/L Final      BUN BUN   Date Value Ref Range Status   04/29/2025 11 6 - 20 mg/dL Final   04/28/2025 11 6 - 20 mg/dL Final   04/28/2025 11 6 - 20 mg/dL Final   04/28/2025 11 6 - 20 mg/dL Final   04/27/2025 10 6 - 20 mg/dL Final      Creatinine Creatinine   Date Value Ref Range Status   04/29/2025 1.19 0.76 - 1.27 mg/dL Final   04/28/2025 1.47 (H) 0.76 - 1.27 mg/dL Final   04/28/2025 1.43 (H) 0.76 - 1.27 mg/dL Final   04/28/2025 1.50 (H) 0.76 - 1.27 mg/dL Final  "  04/27/2025 1.30 (H) 0.76 - 1.27 mg/dL Final      Calcium Calcium   Date Value Ref Range Status   04/29/2025 8.3 (L) 8.6 - 10.5 mg/dL Final   04/28/2025 8.0 (L) 8.6 - 10.5 mg/dL Final   04/28/2025 7.7 (L) 8.6 - 10.5 mg/dL Final   04/28/2025 7.9 (L) 8.6 - 10.5 mg/dL Final   04/27/2025 7.7 (L) 8.6 - 10.5 mg/dL Final      PO4 No results found for: \"CAPO4\"   Albumin No results found for: \"ALBUMIN\"     Magnesium Magnesium   Date Value Ref Range Status   04/29/2025 2.3 1.6 - 2.6 mg/dL Final   04/28/2025 3.2 (H) 1.6 - 2.6 mg/dL Final      Uric Acid Uric Acid   Date Value Ref Range Status   04/28/2025 5.8 3.4 - 7.0 mg/dL Final     Comment:     Falsely depressed results may occur on samples drawn from patients receiving N-Acetylcysteine (NAC) or Metamizole.        Results from last 7 days   Lab Units 04/29/25  0452 04/28/25  1258 04/28/25  0850 04/28/25  0428 04/28/25  0122 04/27/25  2107 04/27/25  1633 04/27/25  1230 04/27/25  1229   SODIUM mmol/L 132*  --  132* 125* 129*   < > 126*  --  125*   POTASSIUM mmol/L 4.6 4.2 4.1 4.1 3.5   < > 3.9  --  3.9   CHLORIDE mmol/L 100  --  103 101 101   < > 94*  --  89*   CO2 mmol/L 21.0*  --  17.0* 11.0* 15.0*   < > 15.0*  --  17.0*   BUN mg/dL 11  --  11 11 11   < > 10  --  9   CREATININE mg/dL 1.19  --  1.47* 1.43* 1.50*   < > 1.51*  --  1.16   CALCIUM mg/dL 8.3*  --  8.0* 7.7* 7.9*   < > 8.1*  --  9.1   ALBUMIN g/dL  --   --   --   --   --   --   --   --  4.7   WBC 10*3/mm3 14.56*  --   --  20.20*  --   --  26.30* 18.63*  --    HEMOGLOBIN g/dL 15.6  --   --  18.0*  --   --  18.4* 20.4*  --    PLATELETS 10*3/mm3 178  --   --  196  --   --  223 235  --    GLUCOSE mg/dL 181*  --  171* 169* 92   < > 309*  --  240*   PHOSPHORUS mg/dL 2.1*  --   --   --   --   --   --   --  1.8*    < > = values in this interval not displayed.              Results Review:     I reviewed the patient's new clinical results.    [START ON 5/1/2025] amLODIPine, 10 mg, Oral, Q24H  atorvastatin, 10 mg, Oral, " Nightly  diazePAM, 10 mg, Oral, Q6H  folic acid 1 mg in sodium chloride 0.9 % 50 mL IVPB, 1 mg, Intravenous, Daily  insulin glargine, 10 Units, Subcutaneous, Daily  insulin lispro, 2-9 Units, Subcutaneous, 4x Daily AC & at Bedtime  lisinopril, 20 mg, Oral, Q24H  mupirocin, 1 Application, Each Nare, BID  pantoprazole, 40 mg, Oral, Q AM  senna-docusate sodium, 2 tablet, Oral, BID  sodium chloride, 10 mL, Intravenous, Q12H         Findings:  The right kidney measures 11.2 x 5.8 x 5.8 cm in length and the left kidney measures 11.9 x 6.3 x 5.2 cm in length     Kidneys demonstrate normal cortical echogenicity.  No hydronephrosis or intrarenal stones.  No focal lesions.     Bladder is incompletely distended. Minimal debris noted within the bladder.     IMPRESSION:     1. Unremarkable ultrasound of the kidneys.     2. Some minimal debris noted within the bladder. Please correlate with urinalys    Medication Review: yes    Assessment & Plan       Seizure    Essential hypertension    Mixed hyperlipidemia    Alcohol withdrawal    Closed fracture of proximal end of right humerus    Stage 3a chronic kidney disease    Type 2 diabetes mellitus      1- DAKSHA on CKD - Pre-renal azotemia vs DAKSHA secondary to Hypertensive crisis. UA + protein, glucose and RBC 3-5. Improving   2- CKD - baseline Scr 1.1-1.3 range. UPCR 0.21  3- Metabolic acidosis - improving.   4- Hyponatremia - corrected for hyperglycemia -134  5- Hypophosphatemia - improving.   6- HTN encephalopathy      Plan  - Continue with gentle hydration.   - Monitor I/O   - Avoid nephrotoxic agents.   - Renal diet   - Adjust meds per renal function   - No emergent need of RRT   - Monitor H/H and transfuse for Hgb less than 7.0   - Replete phosphorus and calcium per protocol.     Michael Fernandez MD  04/30/25  17:17 EDT

## 2025-04-30 NOTE — CONSULTS
Meter education:  Prescribed meter at bedside. Instructed to check expiration date and safe storage of glucose test strips, how to do a control test, prepare lancing device, obtain a sample, and perform test, safe disposal of lancets.  Testing frequency per MD instructions, provided general guidelines on target blood glucose, advised patient to discuss personal targets for glucose at next visit.  Record keeping discussed. Pt was able to return demonstration using teach back method. Discussed and demonstrated how to log test result.  Urged to call [phone] number on back of meter if have any difficulties, complete the warranty card and mail.  Urged patient to obtain prescriptions for test strips and lancets from provider. 30 minutes in the care and education of this patient.

## 2025-04-30 NOTE — CASE MANAGEMENT/SOCIAL WORK
Continued Stay Note  [facility]     Patient Name: Juan C Castro  MRN: [id number]  Today's Date: 4/30/2025    Admit Date: 4/27/2025    Plan: No need for AUD interventions   Discharge Plan       Row Name 04/30/25 1314       Plan    Plan No need for AUD interventions    Plan Comments Consult received, thank you.    HPI: (copied):   55-year-old gentleman presented to the emergency room with nausea rapid breathing chills and elevated blood pressure.  He also reports reduced appetite for the last 1 month.  Apparently he lost his job and stopped getting refills on his blood pressure and other medications.  He reports that he has been borderline diabetic.  In the emergency room he was noted to have significantly elevated blood pressure with witnessed seizure.  The seizure was described to me as tonic seizure unresponsive for approximately 2 minutes with teeth tightly clenched.  He received benzodiazepine in the emergency room.  ER physician/APRN spoke with neurologist Dr. Gonzalez who recommended no Keppra but use scheduled Ativan.  They also recommended stat EEG.  Patient was also noted to be in acute DKA and initiated on insulin drip per Glucomander protocol in the emergency room.       ETOH last ingestion: 4/26 (evening)  ETOH level at admission: <10  ETOH Hx: states that he has been drinking for years- usually about 3-6 a night, after coming home from work  ETOH current consumption: 3-6 beers/night; no hard liquor, ever  AUDIT: 9- Risky consumption  CIWA: see below  Pertinent Labs: see below  Withdrawal potential: low  Previous treatment: none  Longest length of sobriety: none  Legal Issues? None reported  Support System: Fiancee, and mom  Current stressors: loss of job; untreated anxiety  Motivation for change: very high  Potential Barriers: none  Insurance: B/C  Harm Reduction:  Cessation Counseling:  Bedside ETOH cessation counseling provided-  Discussed the neurobiology of ETOH use/abuse in lay-man's  "terms. The continued health detriments of ETOH ingestion were discussed- liver damage, pancreatitis, blood dyscrasias, vitamin/mineral deficiencies, esophageal varices, and possible permanent organ damage.  Recovery planning: see below    Addiction assessment:  Met with patient, his fiancee, Ludy, and his mother today.  Pt is alert and oriented, pleasant, conversant, open and engaging.  Long d/w pt and family.  Time spent in assessment/discussion- approx 1 hour.  Pt endorses consuming approx 3-6 beers on a nightly basis.  Does not engage in operating vehicles while drinking, no legal issues from drinking, no interpersonal issues from drinking.  Ludy states that she \"grew up with an alcoholic and he's (the patient) is definitely not what I would consider an alcoholic.  Pt reports that on Saturday 4/26, he had drank a few beers, but he became \"sick, nauseated, throwing up, and I think I had food poisoning.\"  Describes an event where his R leg cramped up at home.  Admits that he has untreated anxiety and buys Ativan from someone- he states that he only takes a total of 1 mg per day.  He admits that he knows he shouldn't be buying it and that it should be prescribed, and he intends to pursue that option in the future.  Occasional use of marijuana.  He states that he has not changed either habit in the past few days, leading to the hospitalization- meaning- he did not try to abruptly cease ETOH or Ativan prior to coming to the hospital.      Admission labs:  BAL<10   UDS + BZO (given in ED, prior to screen, Opiates (given in ED, prior to screen), THC.  LFT's- AST/ALT/Total bili= 28/34/0.6  Lactate= 3.0(initial); bump (likely post sz) 10.1  Na 125  Mg 1.5  PO 4 1.8  WBC  18.6    Ketones 15  HTN in /145    CIWAs range today: 0-5 Highest CIWA thus far= 5  Has not shown any s/s ETOH withdrawal while hospitalized.  Does not feel that he drinks alcoholically.      Overall assessment- it does not appear that this " patient has AUD.  No s/s acute ETOH w/d.  Although he does remain on Valium PO 10 mg Q 6- which is wise, given the seizure, and the street Ativan use- it still does not appear that the patient has AUD.  AUDIT score is relatively low at 9, which warrants only a brief intervention- which our team has completed today.  He is concerned that he should seek medical/psych assistance for his untreated anxiety- and I concur.  Appears to have good insight, an excellent support system, and a true concern for seeking help for his HTN, diabetes, and his untreated anxiety.    Regarding the beer consumption and low sodium:  Discussed the impact of beer consumption relating to sodium levels.  Beer contains quite a bit of water and not much sodium, and in a person with poor PO nutritional intake, particularly low protein foods, the phenomenon of potomania may occur.  Discussed elimination of beer and an increase in protein rich foods,  Discussed the health detriments of hyponatremia- confusion/disorientation, loss of energy, falls, cerebral edema, potential Rhabdo, seizures, coma and death.     No further interventions warranted from Addiction Medicine.  No indication for MAT for AUD.  No indication for AUD treatment.    Recommend:  Follow-up with PCP, (discussed better medical/medication compliance going forward), follow-up with psych- to treat underlying anxiety.    Thank you very much for this consult on this pleasant gentleman.    Pt has been provided our outreach number for continued support and resources post discharge.    Addiction medicine will sign off at this time; please re-consult if needed.                     Discharge Codes    No documentation.                 Expected Discharge Date and Time       Expected Discharge Date Expected Discharge Time    May 2, 2025               Andreina Deluca RN MA,BSN-  Addiction Medicine

## 2025-04-30 NOTE — PROGRESS NOTES
[facility] Medicine Services  PROGRESS NOTE    Patient Name: Juan C Castro  : 1970  MRN: [id number]    Date of Admission: 2025  Primary Care Physician: Provider, No Known    Subjective   Subjective     CC:  ICU transfer    HPI:  States that he has been better.  Reports that drink 3-4 beers per day, last was Friday as felt sick/thought had food poisoning on Saturday.  Came to ER on .  Also reports that takes ativan 1mg per day that is not prescribed to him.       Objective   Objective     Vital Signs:   Temp:  [98.1 °F (36.7 °C)-98.7 °F (37.1 °C)] 98.3 °F (36.8 °C)  Heart Rate:  [73-85] 78  Resp:  [18-20] 18  BP: (156-186)/() 186/100     Physical Exam:  Constitutional: No acute distress, awake, alert  HENT: NCAT, mucous membranes moist  Respiratory: Clear to auscultation bilaterally, respiratory effort normal   Cardiovascular: RRR, no murmurs, rubs, or gallops  Gastrointestinal: Positive bowel sounds, soft, nontender, nondistended  Musculoskeletal: No bilateral ankle edema  Psychiatric: Appropriate affect, cooperative  Neurologic: Oriented x 3, BRAUN, speech clear  Skin: No rashes      Results Reviewed:  LAB RESULTS:      Lab 25  0452 25  1258 25  0428 25  2107 25  1844 25  1633 25  1549 25  1443 25  1230 25  1229   WBC 14.56*  --  20.20*  --   --  26.30*  --   --  18.63*  --    HEMOGLOBIN 15.6  --  18.0*  --   --  18.4*  --   --  20.4*  --    HEMATOCRIT 44.3  --  55.0*  --   --  51.5*  --   --  56.8*  --    PLATELETS 178  --  196  --   --  223  --   --  235  --    NEUTROS ABS 11.41*  --  17.21*  --   --  23.93*  --   --  16.85*  --    IMMATURE GRANS (ABS) 0.06*  --  0.17*  --   --   --   --   --  0.09*  --    LYMPHS ABS 1.62  --  1.35  --   --   --   --   --  0.95  --    MONOS ABS 1.36*  --  1.42*  --   --   --   --   --  0.69  --    EOS ABS 0.08  --  0.01  --   --  0.00  --   --  0.00  --    MCV  92.9  --  99.8*  --   --  91.6  --   --  91.0  --    PROCALCITONIN  --   --   --   --   --   --   --  0.05  --   --    LACTATE  --   --   --  1.6 2.7*  --  10.1*  --   --  3.0*   LDH  --  423*  --   --   --   --   --   --   --   --    HSTROP T  --   --   --   --   --   --   --  60*  --  35*         Lab 04/29/25  0452 04/28/25  1258 04/28/25  0850 04/28/25  0428 04/28/25  0122 04/27/25  2107 04/27/25  1633 04/27/25  1230 04/27/25  1229   SODIUM 132*  --  132* 125* 129* 127*   < >  --  125*   POTASSIUM 4.6 4.2 4.1 4.1 3.5 3.9   < >  --  3.9   CHLORIDE 100  --  103 101 101 98   < >  --  89*   CO2 21.0*  --  17.0* 11.0* 15.0* 16.0*   < >  --  17.0*   ANION GAP 11.0  --  12.0 13.0 13.0 13.0   < >  --  19.0*   BUN 11  --  11 11 11 10   < >  --  9   CREATININE 1.19  --  1.47* 1.43* 1.50* 1.30*   < >  --  1.16   EGFR 72.6  --  56.3* 58.2* 55.0* 65.3   < >  --  74.8   GLUCOSE 181*  --  171* 169* 92 178*   < >  --  240*   CALCIUM 8.3*  --  8.0* 7.7* 7.9* 7.7*   < >  --  9.1   MAGNESIUM 2.3  --   --  3.2*  --   --   --   --  1.5*   PHOSPHORUS 2.1*  --   --   --   --   --   --   --  1.8*   HEMOGLOBIN A1C  --   --   --   --   --   --   --  6.60*  --    TSH  --   --   --   --   --   --   --   --  1.370    < > = values in this interval not displayed.         Lab 04/27/25  1229   TOTAL PROTEIN 8.5   ALBUMIN 4.7   GLOBULIN 3.8   ALT (SGPT) 34   AST (SGOT) 28   BILIRUBIN 0.6   ALK PHOS 135*   LIPASE 25         Lab 04/27/25  1443 04/27/25  1229   HSTROP T 60* 35*             Lab 04/28/25  1258   IRON 46*   IRON SATURATION (TSAT) 15*   TIBC 308   TRANSFERRIN 207   FERRITIN 515.30*         Lab 04/27/25  1442   FIO2 21   CARBOXYHEMOGLOBIN (VENOUS) 1.2     Brief Urine Lab Results  (Last result in the past 365 days)        Color   Clarity   Blood   Leuk Est   Nitrite   Protein   CREAT   Urine HCG        04/28/25 1609 Yellow   Cloudy   Trace   Negative   Negative   Negative           04/28/25 1609             58.2                  Microbiology Results Abnormal       None            US Renal Bilateral  Result Date: 4/29/2025  US RENAL BILATERAL Date of Exam: 4/28/2025 11:24 PM EDT Indication: DAKSHA. Comparison: No comparisons available. Technique: Grayscale and color Doppler ultrasound evaluation of the kidneys and urinary bladder was performed. Findings: The right kidney measures 11.2 x 5.8 x 5.8 cm in length and the left kidney measures 11.9 x 6.3 x 5.2 cm in length Kidneys demonstrate normal cortical echogenicity.  No hydronephrosis or intrarenal stones.  No focal lesions. Bladder is incompletely distended. Minimal debris noted within the bladder.     Impression: 1. Unremarkable ultrasound of the kidneys. 2. Some minimal debris noted within the bladder. Please correlate with urinalysis. Electronically Signed: Bk Torres MD  4/29/2025 6:08 AM EDT  Workstation ID: [id number]          Current medications:  Scheduled Meds:amLODIPine, 5 mg, Oral, Q24H  atorvastatin, 10 mg, Oral, Nightly  diazePAM, 10 mg, Oral, Q6H  folic acid 1 mg in sodium chloride 0.9 % 50 mL IVPB, 1 mg, Intravenous, Daily  insulin glargine, 10 Units, Subcutaneous, Daily  insulin lispro, 2-9 Units, Subcutaneous, 4x Daily AC & at Bedtime  lisinopril, 20 mg, Oral, Q24H  mupirocin, 1 Application, Each Nare, BID  pantoprazole, 40 mg, Oral, Q AM  senna-docusate sodium, 2 tablet, Oral, BID  sodium chloride, 10 mL, Intravenous, Q12H      Continuous Infusions:   PRN Meds:.  senna-docusate sodium **AND** polyethylene glycol **AND** bisacodyl **AND** bisacodyl    dextrose    dextrose    glucagon (human recombinant)    hydrALAZINE    HYDROcodone-acetaminophen    HYDROmorphone    ipratropium-albuterol    labetalol    LORazepam    LORazepam    nitroglycerin    Phosphorus Replacement - Follow Nurse / BPA Driven Protocol    Sodium Chloride (PF)    sodium chloride    sodium chloride    Assessment & Plan   Assessment & Plan     Active Hospital Problems    Diagnosis  POA    **Seizure  [R56.9]  Yes    Alcohol withdrawal [F10.939]  Yes    Closed fracture of proximal end of right humerus [S42.201A]  Yes    Stage 3a chronic kidney disease [N18.31]  Yes    Type 2 diabetes mellitus [E11.9]  Yes    Mixed hyperlipidemia [E78.2]  Yes    Essential hypertension [I10]  Yes      Resolved Hospital Problems   No resolved problems to display.        Brief Hospital Course to date:  Juan C Castro is a 55 y.o. male with h/o T2DM, HTN, HL, CKD, etoh use, takes ativan that is not prescribed to him presented to ER on 4/27/25 for HTN, nausea, chills, and tachypnea.  Had significantly elevated BP with a witnessed seizure in the ER.  He became combative in ER requiring physical restraint and subsequent pain and bruising of RUE.  CT RUE showed comminuted proximal humerus fracture.  Neurology consulted patient and treated him with scheduled ativan.  He was also found to be in DKA with only prior history of prediabetes.  Was initially admitted to ICU.  Note that patient apparantly lost his job and stopped getting refills on BP meds and other meds.      Seizure  --MRI brain showed no acute  --seen by neurology who diagnosed as new onset generalized tonic clonic seizure in setting of severe HTN, suspected DKA, sleep deprivation, daily etoh and street ativan.  recommended scheduled valium and PRN ativan, thiamine--recs that ok to wean valium as titrated.  Recs outpatient neurology f/u  --NO DRIVING x 90 Days    HTN Urgency  --required cardene gtt now weaned off  --started on lisinopril and amlodipine.  's again today so increase amlodipine to 10mg    DKA  --resolved  --A1C was 6.60 on 4/27  --seen by diabetes educator .  Needs diabetic testing equipment at discharge  --continue lantus 10units qhs and SSI    HL  --started on statin    Close Fracture of Proximal Right Humerus  --seen by ortho/Dr. Bonner who recs shoulder arthroplasty d/t the morphology of the fracture.  He is neurovascularly intact therefore  determined not emergent and Dr. Bonner recs f/u with  outpatient for surgery.  Ice and sling for comfort for now    DAKSHA on CKD  --nephro following.  Recs bicarb drip  --renal u/s normal    Etoh WD  --scheduled valium 10mg QID.  Patient declines WD as reports only 3-4 beers per day.   --on CIWA protocol, scores have been low.  ?taper valium soon  --seen by addiction medicine          Expected Discharge Location and Transportation:   Expected Discharge   Expected Discharge Date: 5/2/2025; Expected Discharge Time:      VTE Prophylaxis:  Mechanical VTE prophylaxis orders are present.         AM-PAC 6 Clicks Score (PT): 24 (04/29/25 2007)    CODE STATUS:   Code Status and Medical Interventions: CPR (Attempt to Resuscitate); Full Support   Ordered at: 04/28/25 1222     Code Status (Patient has no pulse and is not breathing):    CPR (Attempt to Resuscitate)     Medical Interventions (Patient has pulse or is breathing):    Full Support       Woody Mccullough MD  04/30/25

## 2025-05-01 LAB
GLUCOSE BLDC GLUCOMTR-MCNC: 117 MG/DL (ref 70–130)
GLUCOSE BLDC GLUCOMTR-MCNC: 141 MG/DL (ref 70–130)
GLUCOSE BLDC GLUCOMTR-MCNC: 177 MG/DL (ref 70–130)
GLUCOSE BLDC GLUCOMTR-MCNC: 206 MG/DL (ref 70–130)

## 2025-05-01 PROCEDURE — 63710000001 INSULIN GLARGINE PER 5 UNITS: Performed by: INTERNAL MEDICINE

## 2025-05-01 PROCEDURE — 99232 SBSQ HOSP IP/OBS MODERATE 35: CPT | Performed by: INTERNAL MEDICINE

## 2025-05-01 PROCEDURE — 25010000002 FOLIC ACID 5 MG/ML SOLUTION 10 ML VIAL

## 2025-05-01 PROCEDURE — 63710000001 INSULIN LISPRO (HUMAN) PER 5 UNITS: Performed by: INTERNAL MEDICINE

## 2025-05-01 PROCEDURE — 25010000002 HYDROMORPHONE PER 4 MG: Performed by: PSYCHIATRY & NEUROLOGY

## 2025-05-01 PROCEDURE — 82948 REAGENT STRIP/BLOOD GLUCOSE: CPT

## 2025-05-01 RX ORDER — DIAZEPAM 2 MG/1
6 TABLET ORAL EVERY 8 HOURS
Status: DISCONTINUED | OUTPATIENT
Start: 2025-05-01 | End: 2025-05-02

## 2025-05-01 RX ADMIN — HYDROCODONE BITARTRATE AND ACETAMINOPHEN 1 TABLET: 7.5; 325 TABLET ORAL at 20:02

## 2025-05-01 RX ADMIN — PANTOPRAZOLE SODIUM 40 MG: 40 TABLET, DELAYED RELEASE ORAL at 06:13

## 2025-05-01 RX ADMIN — INSULIN LISPRO 4 UNITS: 100 INJECTION, SOLUTION INTRAVENOUS; SUBCUTANEOUS at 21:26

## 2025-05-01 RX ADMIN — LISINOPRIL 20 MG: 20 TABLET ORAL at 09:08

## 2025-05-01 RX ADMIN — HYDROMORPHONE HYDROCHLORIDE 0.5 MG: 1 INJECTION, SOLUTION INTRAMUSCULAR; INTRAVENOUS; SUBCUTANEOUS at 04:41

## 2025-05-01 RX ADMIN — INSULIN GLARGINE 10 UNITS: 100 INJECTION, SOLUTION SUBCUTANEOUS at 09:07

## 2025-05-01 RX ADMIN — DIAZEPAM 6 MG: 2 TABLET ORAL at 12:50

## 2025-05-01 RX ADMIN — HYDROMORPHONE HYDROCHLORIDE 0.5 MG: 1 INJECTION, SOLUTION INTRAMUSCULAR; INTRAVENOUS; SUBCUTANEOUS at 17:33

## 2025-05-01 RX ADMIN — Medication 10 ML: at 09:09

## 2025-05-01 RX ADMIN — ATORVASTATIN CALCIUM 10 MG: 10 TABLET, FILM COATED ORAL at 20:02

## 2025-05-01 RX ADMIN — AMLODIPINE BESYLATE 10 MG: 10 TABLET ORAL at 09:08

## 2025-05-01 RX ADMIN — Medication 20 MG: at 04:48

## 2025-05-01 RX ADMIN — DIAZEPAM 6 MG: 2 TABLET ORAL at 20:02

## 2025-05-01 RX ADMIN — Medication 10 ML: at 20:02

## 2025-05-01 RX ADMIN — MUPIROCIN 1 APPLICATION: 20 OINTMENT TOPICAL at 09:09

## 2025-05-01 RX ADMIN — HYDROCODONE BITARTRATE AND ACETAMINOPHEN 1 TABLET: 7.5; 325 TABLET ORAL at 12:50

## 2025-05-01 RX ADMIN — HYDROMORPHONE HYDROCHLORIDE 0.5 MG: 1 INJECTION, SOLUTION INTRAMUSCULAR; INTRAVENOUS; SUBCUTANEOUS at 09:27

## 2025-05-01 RX ADMIN — HYDROMORPHONE HYDROCHLORIDE 0.5 MG: 1 INJECTION, SOLUTION INTRAMUSCULAR; INTRAVENOUS; SUBCUTANEOUS at 23:31

## 2025-05-01 RX ADMIN — FOLIC ACID 1 MG: 5 INJECTION, SOLUTION INTRAMUSCULAR; INTRAVENOUS; SUBCUTANEOUS at 09:08

## 2025-05-01 RX ADMIN — DIAZEPAM 10 MG: 10 TABLET ORAL at 04:36

## 2025-05-01 RX ADMIN — MUPIROCIN 1 APPLICATION: 20 OINTMENT TOPICAL at 20:02

## 2025-05-01 RX ADMIN — INSULIN LISPRO 2 UNITS: 100 INJECTION, SOLUTION INTRAVENOUS; SUBCUTANEOUS at 09:07

## 2025-05-01 NOTE — PROGRESS NOTES
"   LOS: 4 days    Patient Care Team:  Provider, No Known as PCP - General    Chief Complaint: Nausea, HTN and SOA    Subjective     Interval History:     No acute events overnight. No new complaints. No new labs.       Review of Systems:         Objective     Vital Sign Min/Max for last 24 hours  Temp  Min: 98.3 °F (36.8 °C)  Max: 98.8 °F (37.1 °C)   BP  Min: 155/84  Max: 181/97   Pulse  Min: 71  Max: 85   Resp  Min: 16  Max: 18   SpO2  Min: 92 %  Max: 95 %   No data recorded   Weight  Min: 69.1 kg (152 lb 5.4 oz)  Max: 69.1 kg (152 lb 5.4 oz)     Flowsheet Rows      Flowsheet Row First Filed Value   Admission Height 182.9 cm (72\") Documented at 04/27/2025 1134   Admission Weight 102 kg (225 lb) Documented at 04/27/2025 1134            I/O this shift:  In: -   Out: 825 [Urine:825]  I/O last 3 completed shifts:  In: 765 [P.O.:765]  Out: 7835 [Urine:7835]    Physical Exam:    Gen: Alert, NAD   HENT: NC, AT, EOMI   NECK: Supple, no JVD, Trachea midline   LUNGS: CTA bilaterally, non labored respirtation   CVS: S1/S2 audible, RRR, no murmur   Abd: Soft, NT, ND, BS+   Ext: No pedal edema, no cyanosis   CNS: Alert, No focal deficit noted grossly  Psy: Cooperative  Skin: Warm, dry and intact      WBC WBC   Date Value Ref Range Status   04/29/2025 14.56 (H) 3.40 - 10.80 10*3/mm3 Final      HGB Hemoglobin   Date Value Ref Range Status   04/29/2025 15.6 13.0 - 17.7 g/dL Final      HCT Hematocrit   Date Value Ref Range Status   04/29/2025 44.3 37.5 - 51.0 % Final      Platlets No results found for: \"LABPLAT\"   MCV MCV   Date Value Ref Range Status   04/29/2025 92.9 79.0 - 97.0 fL Final          Sodium Sodium   Date Value Ref Range Status   04/29/2025 132 (L) 136 - 145 mmol/L Final      Potassium Potassium   Date Value Ref Range Status   04/29/2025 4.6 3.5 - 5.2 mmol/L Final     Comment:     Slight hemolysis detected by analyzer. Result may be falsely elevated.      Chloride Chloride   Date Value Ref Range Status   04/29/2025 " "100 98 - 107 mmol/L Final      CO2 CO2   Date Value Ref Range Status   04/29/2025 21.0 (L) 22.0 - 29.0 mmol/L Final      BUN BUN   Date Value Ref Range Status   04/29/2025 11 6 - 20 mg/dL Final      Creatinine Creatinine   Date Value Ref Range Status   04/29/2025 1.19 0.76 - 1.27 mg/dL Final      Calcium Calcium   Date Value Ref Range Status   04/29/2025 8.3 (L) 8.6 - 10.5 mg/dL Final      PO4 No results found for: \"CAPO4\"   Albumin No results found for: \"ALBUMIN\"     Magnesium Magnesium   Date Value Ref Range Status   04/29/2025 2.3 1.6 - 2.6 mg/dL Final      Uric Acid No results found for: \"URICACID\"       Results from last 7 days   Lab Units 04/29/25  0452 04/28/25  1258 04/28/25  0850 04/28/25  0428 04/28/25  0122 04/27/25  2107 04/27/25  1633 04/27/25  1230 04/27/25  1229   SODIUM mmol/L 132*  --  132* 125* 129*   < > 126*  --  125*   POTASSIUM mmol/L 4.6 4.2 4.1 4.1 3.5   < > 3.9  --  3.9   CHLORIDE mmol/L 100  --  103 101 101   < > 94*  --  89*   CO2 mmol/L 21.0*  --  17.0* 11.0* 15.0*   < > 15.0*  --  17.0*   BUN mg/dL 11  --  11 11 11   < > 10  --  9   CREATININE mg/dL 1.19  --  1.47* 1.43* 1.50*   < > 1.51*  --  1.16   CALCIUM mg/dL 8.3*  --  8.0* 7.7* 7.9*   < > 8.1*  --  9.1   ALBUMIN g/dL  --   --   --   --   --   --   --   --  4.7   WBC 10*3/mm3 14.56*  --   --  20.20*  --   --  26.30* 18.63*  --    HEMOGLOBIN g/dL 15.6  --   --  18.0*  --   --  18.4* 20.4*  --    PLATELETS 10*3/mm3 178  --   --  196  --   --  223 235  --    GLUCOSE mg/dL 181*  --  171* 169* 92   < > 309*  --  240*   PHOSPHORUS mg/dL 2.1*  --   --   --   --   --   --   --  1.8*    < > = values in this interval not displayed.              Results Review:     I reviewed the patient's new clinical results.    amLODIPine, 10 mg, Oral, Q24H  atorvastatin, 10 mg, Oral, Nightly  diazePAM, 6 mg, Oral, Q8H  folic acid 1 mg in sodium chloride 0.9 % 50 mL IVPB, 1 mg, Intravenous, Daily  insulin glargine, 10 Units, Subcutaneous, Daily  insulin " lispro, 2-9 Units, Subcutaneous, 4x Daily AC & at Bedtime  lisinopril, 20 mg, Oral, Q24H  mupirocin, 1 Application, Each Nare, BID  pantoprazole, 40 mg, Oral, Q AM  senna-docusate sodium, 2 tablet, Oral, BID  sodium chloride, 10 mL, Intravenous, Q12H         Findings:  The right kidney measures 11.2 x 5.8 x 5.8 cm in length and the left kidney measures 11.9 x 6.3 x 5.2 cm in length     Kidneys demonstrate normal cortical echogenicity.  No hydronephrosis or intrarenal stones.  No focal lesions.     Bladder is incompletely distended. Minimal debris noted within the bladder.     IMPRESSION:     1. Unremarkable ultrasound of the kidneys.     2. Some minimal debris noted within the bladder. Please correlate with urinalys    Medication Review: yes    Assessment & Plan       Seizure    Essential hypertension    Mixed hyperlipidemia    Alcohol withdrawal    Closed fracture of proximal end of right humerus    Stage 3a chronic kidney disease    Type 2 diabetes mellitus      1- DAKSHA on CKD - Pre-renal azotemia vs DAKSHA secondary to Hypertensive crisis. UA + protein, glucose and RBC 3-5. Improving   2- CKD - baseline Scr 1.1-1.3 range. UPCR 0.21  3- Metabolic acidosis - improving.   4- Hyponatremia - corrected for hyperglycemia -134  5- Hypophosphatemia - improving.   6- HTN encephalopathy      Plan  - Check labs in AM.   - Monitor I/O   - Avoid nephrotoxic agents.   - Renal diet   -    Michael Fernandez MD  05/01/25  14:20 EDT

## 2025-05-01 NOTE — CASE MANAGEMENT/SOCIAL WORK
Continued Stay Note  [facility]     Patient Name: Juan C Castro  MRN: [id number]  Today's Date: 5/1/2025    Admit Date: 4/27/2025    Plan: Home with family   Discharge Plan       Row Name 05/01/25 1020       Plan    Plan Home with family    Patient/Family in Agreement with Plan yes    Plan Comments poke to patient at bedside. Plan is home with SO and mother. Family will transport. Patient denies any discharge needs. CM will continue to follow.    Final Discharge Disposition Code 01 - home or self-care                   Discharge Codes    No documentation.                 Expected Discharge Date and Time       Expected Discharge Date Expected Discharge Time    May 2, 2025               Calvin Celestin RN

## 2025-05-01 NOTE — PROGRESS NOTES
[facility] Medicine Services  PROGRESS NOTE    Patient Name: Juan C Castro  : 1970  MRN: [id number]    Date of Admission: 2025  Primary Care Physician: Provider, No Known    Subjective   Subjective     CC:  Seizure activity    HPI:  Seems more anxious today, having significant pain in his shoulder      Objective   Objective     Vital Signs:   Temp:  [98 °F (36.7 °C)-98.9 °F (37.2 °C)] 98 °F (36.7 °C)  Heart Rate:  [77-84] 83  Resp:  [18] 18  BP: (135-168)/(75-93) 151/90     Physical Exam:  Constitutional: No acute distress, but seems nervous  HENT: NCAT, mucous membranes moist  Respiratory: Respiratory effort normal   Musculoskeletal: Right upper extremity bruising and ecchymosis  Psychiatric: Appropriate affect, cooperative  Neurologic: Oriented x 3, speech clear  Skin: No rashes      Results Reviewed:  LAB RESULTS:      Lab 25  0634 25  0452 25  1258 25  0428 25  2107 25  1844 25  1633 25  1549 25  1443 25  1230 25  1229   WBC 9.91 14.56*  --  20.20*  --   --  26.30*  --   --  18.63*  --    HEMOGLOBIN 16.4 15.6  --  18.0*  --   --  18.4*  --   --  20.4*  --    HEMATOCRIT 48.0 44.3  --  55.0*  --   --  51.5*  --   --  56.8*  --    PLATELETS 254 178  --  196  --   --  223  --   --  235  --    NEUTROS ABS 6.05 11.41*  --  17.21*  --   --  23.93*  --   --  16.85*  --    IMMATURE GRANS (ABS) 0.03 0.06*  --  0.17*  --   --   --   --   --  0.09*  --    LYMPHS ABS 2.26 1.62  --  1.35  --   --   --   --   --  0.95  --    MONOS ABS 1.08* 1.36*  --  1.42*  --   --   --   --   --  0.69  --    EOS ABS 0.43* 0.08  --  0.01  --   --  0.00  --   --  0.00  --    MCV 95.2 92.9  --  99.8*  --   --  91.6  --   --  91.0  --    PROCALCITONIN  --   --   --   --   --   --   --   --  0.05  --   --    LACTATE  --   --   --   --  1.6 2.7*  --  10.1*  --   --  3.0*   LDH  --   --  423*  --   --   --   --   --   --   --   --     HSTROP T  --   --   --   --   --   --   --   --  60*  --  35*         Lab 05/02/25  0634 04/29/25  0452 04/28/25  1258 04/28/25  0850 04/28/25  0428 04/28/25  0122 04/27/25  1633 04/27/25  1230 04/27/25  1229   SODIUM 134* 132*  --  132* 125* 129*   < >  --  125*   POTASSIUM 4.2 4.6 4.2 4.1 4.1 3.5   < >  --  3.9   CHLORIDE 97* 100  --  103 101 101   < >  --  89*   CO2 25.0 21.0*  --  17.0* 11.0* 15.0*   < >  --  17.0*   ANION GAP 12.0 11.0  --  12.0 13.0 13.0   < >  --  19.0*   BUN 15 11  --  11 11 11   < >  --  9   CREATININE 1.19 1.19  --  1.47* 1.43* 1.50*   < >  --  1.16   EGFR 72.6 72.6  --  56.3* 58.2* 55.0*   < >  --  74.8   GLUCOSE 140* 181*  --  171* 169* 92   < >  --  240*   CALCIUM 9.4 8.3*  --  8.0* 7.7* 7.9*   < >  --  9.1   MAGNESIUM  --  2.3  --   --  3.2*  --   --   --  1.5*   PHOSPHORUS 3.3 2.1*  --   --   --   --   --   --  1.8*   HEMOGLOBIN A1C  --   --   --   --   --   --   --  6.60*  --    TSH  --   --   --   --   --   --   --   --  1.370    < > = values in this interval not displayed.         Lab 05/02/25  0634 04/27/25  1229   TOTAL PROTEIN  --  8.5   ALBUMIN 3.9 4.7   GLOBULIN  --  3.8   ALT (SGPT)  --  34   AST (SGOT)  --  28   BILIRUBIN  --  0.6   ALK PHOS  --  135*   LIPASE  --  25         Lab 04/27/25  1443 04/27/25  1229   HSTROP T 60* 35*             Lab 04/28/25  1258   IRON 46*   IRON SATURATION (TSAT) 15*   TIBC 308   TRANSFERRIN 207   FERRITIN 515.30*         Lab 04/27/25  1442   FIO2 21   CARBOXYHEMOGLOBIN (VENOUS) 1.2     Brief Urine Lab Results  (Last result in the past 365 days)        Color   Clarity   Blood   Leuk Est   Nitrite   Protein   CREAT   Urine HCG        04/28/25 1609 Yellow   Cloudy   Trace   Negative   Negative   Negative           04/28/25 1609             58.2                 Microbiology Results Abnormal       None            No radiology results from the last 24 hrs        Current medications:  Scheduled Meds:amLODIPine, 10 mg, Oral, Q24H  atorvastatin, 10  mg, Oral, Nightly  diazePAM, 4 mg, Oral, Q12H  folic acid 1 mg in sodium chloride 0.9 % 50 mL IVPB, 1 mg, Intravenous, Daily  insulin glargine, 10 Units, Subcutaneous, Daily  insulin lispro, 2-9 Units, Subcutaneous, 4x Daily AC & at Bedtime  lisinopril, 20 mg, Oral, Q24H  mupirocin, 1 Application, Each Nare, BID  pantoprazole, 40 mg, Oral, Q AM  senna-docusate sodium, 2 tablet, Oral, BID  sodium chloride, 10 mL, Intravenous, Q12H      Continuous Infusions:   PRN Meds:.  senna-docusate sodium **AND** polyethylene glycol **AND** bisacodyl **AND** bisacodyl    dextrose    dextrose    glucagon (human recombinant)    hydrALAZINE    ipratropium-albuterol    labetalol    LORazepam    nitroglycerin    oxyCODONE-acetaminophen    Phosphorus Replacement - Follow Nurse / BPA Driven Protocol    Sodium Chloride (PF)    sodium chloride    sodium chloride    Assessment & Plan   Assessment & Plan     Active Hospital Problems    Diagnosis  POA    **Seizure [R56.9]  Yes    Alcohol withdrawal [F10.939]  Yes    Closed fracture of proximal end of right humerus [S42.201A]  Yes    Stage 3a chronic kidney disease [N18.31]  Yes    Type 2 diabetes mellitus [E11.9]  Yes    Mixed hyperlipidemia [E78.2]  Yes    Essential hypertension [I10]  Yes      Resolved Hospital Problems   No resolved problems to display.        Brief Hospital Course to date:  Juan C Castro is a 55 y.o. male with h/o T2DM, HTN, HL, CKD, etoh use, takes ativan that is not prescribed to him, presented to ER on 4/27/25 for HTN, nausea, chills, and tachypnea.    Had significantly elevated BP with a witnessed seizure in the ER.    He became combative in ER requiring physical restraint and subsequent pain and bruising of RUE.    CT RUE showed comminuted proximal humerus fracture.    Neurology evaluated the patient and treated him with scheduled ativan.    He was also found to be in DKA with only prior history of prediabetes.    Was initially admitted to ICU.  Note that  patient apparently lost his job and stopped getting refills on BP meds and other meds.       Seizure  --MRI brain showed no acute  --seen by neurology who diagnosed as new onset generalized tonic clonic seizure in setting of severe HTN, suspected DKA, sleep deprivation, daily etoh and street ativan.    -- Wean Valium  --NO DRIVING x 90 Days     HTN Urgency, improved  --required cardene gtt now weaned off  --Continue lisinopril and amlodipine     DKA  --resolved  --A1C was 6.60 on 4/27  --seen by diabetes educator .  Needs diabetic testing equipment at discharge  --continue lantus 10units qhs and SSI     HL  -- Continue statin     Close Fracture of Proximal Right Humerus  --seen by orthowho recommended shoulder arthroplasty d/t the morphology of the fracture.  He is neurovascularly intact therefore determined not emergent and Dr. Bonner recommends follow-up with  outpatient for surgery.    Ice and sling for comfort for now.  Have sent referral for [facility], [facility], clinic is supposed to call patient with appointment  -  Change [place] to Percocet for pain control for his shoulder    DAKSHA on CKD  --nephro following  --renal u/s normal     Etoh WD  -- Wean scheduled Valium.  Decrease Valium to 4 mg twice daily.     Expected Discharge Location and Transportation: Home tomorrow  Expected Discharge   Expected Discharge Date: 5/3/2025; Expected Discharge Time:      VTE Prophylaxis:  Mechanical VTE prophylaxis orders are present.         AM-PAC 6 Clicks Score (PT): 24 (05/01/25 2000)    CODE STATUS:   Code Status and Medical Interventions: CPR (Attempt to Resuscitate); Full Support   Ordered at: 04/28/25 1222     Code Status (Patient has no pulse and is not breathing):    CPR (Attempt to Resuscitate)     Medical Interventions (Patient has pulse or is breathing):    Full Support       Berkley Valdez, DO  05/02/25

## 2025-05-01 NOTE — PROGRESS NOTES
[facility] Medicine Services  PROGRESS NOTE    Patient Name: Juan C Castro  : 1970  MRN: [id number]    Date of Admission: 2025  Primary Care Physician: Provider, No Known    Subjective   Subjective     CC:  Seizure activity    HPI:  Complaining of pain in his right upper extremity, sounds like he has been using benzos for some time      Objective   Objective     Vital Signs:   Temp:  [98.3 °F (36.8 °C)-98.8 °F (37.1 °C)] 98.7 °F (37.1 °C)  Heart Rate:  [71-85] 81  Resp:  [16-18] 18  BP: (155-181)/() 168/97     Physical Exam:  Constitutional: No acute distress, awake, alert  HENT: NCAT, mucous membranes moist  Respiratory: Respiratory effort normal   Gastrointestinal: Soft, nontender, nondistended  Musculoskeletal: No LE edema, bruising and swelling of right arm  Psychiatric: Appropriate affect, cooperative  Neurologic: Oriented x 3, speech clear  Skin: No rashes      Results Reviewed:  LAB RESULTS:      Lab 25  0452 25  1258 25  0428 25  2107 25  1844 25  1633 25  1549 25  1443 25  1230 25  1229   WBC 14.56*  --  20.20*  --   --  26.30*  --   --  18.63*  --    HEMOGLOBIN 15.6  --  18.0*  --   --  18.4*  --   --  20.4*  --    HEMATOCRIT 44.3  --  55.0*  --   --  51.5*  --   --  56.8*  --    PLATELETS 178  --  196  --   --  223  --   --  235  --    NEUTROS ABS 11.41*  --  17.21*  --   --  23.93*  --   --  16.85*  --    IMMATURE GRANS (ABS) 0.06*  --  0.17*  --   --   --   --   --  0.09*  --    LYMPHS ABS 1.62  --  1.35  --   --   --   --   --  0.95  --    MONOS ABS 1.36*  --  1.42*  --   --   --   --   --  0.69  --    EOS ABS 0.08  --  0.01  --   --  0.00  --   --  0.00  --    MCV 92.9  --  99.8*  --   --  91.6  --   --  91.0  --    PROCALCITONIN  --   --   --   --   --   --   --  0.05  --   --    LACTATE  --   --   --  1.6 2.7*  --  10.1*  --   --  3.0*   LDH  --  423*  --   --   --   --   --   --   --    --    HSTROP T  --   --   --   --   --   --   --  60*  --  35*         Lab 04/29/25  0452 04/28/25  1258 04/28/25  0850 04/28/25  0428 04/28/25  0122 04/27/25  2107 04/27/25  1633 04/27/25  1230 04/27/25  1229   SODIUM 132*  --  132* 125* 129* 127*   < >  --  125*   POTASSIUM 4.6 4.2 4.1 4.1 3.5 3.9   < >  --  3.9   CHLORIDE 100  --  103 101 101 98   < >  --  89*   CO2 21.0*  --  17.0* 11.0* 15.0* 16.0*   < >  --  17.0*   ANION GAP 11.0  --  12.0 13.0 13.0 13.0   < >  --  19.0*   BUN 11  --  11 11 11 10   < >  --  9   CREATININE 1.19  --  1.47* 1.43* 1.50* 1.30*   < >  --  1.16   EGFR 72.6  --  56.3* 58.2* 55.0* 65.3   < >  --  74.8   GLUCOSE 181*  --  171* 169* 92 178*   < >  --  240*   CALCIUM 8.3*  --  8.0* 7.7* 7.9* 7.7*   < >  --  9.1   MAGNESIUM 2.3  --   --  3.2*  --   --   --   --  1.5*   PHOSPHORUS 2.1*  --   --   --   --   --   --   --  1.8*   HEMOGLOBIN A1C  --   --   --   --   --   --   --  6.60*  --    TSH  --   --   --   --   --   --   --   --  1.370    < > = values in this interval not displayed.         Lab 04/27/25  1229   TOTAL PROTEIN 8.5   ALBUMIN 4.7   GLOBULIN 3.8   ALT (SGPT) 34   AST (SGOT) 28   BILIRUBIN 0.6   ALK PHOS 135*   LIPASE 25         Lab 04/27/25  1443 04/27/25  1229   HSTROP T 60* 35*             Lab 04/28/25  1258   IRON 46*   IRON SATURATION (TSAT) 15*   TIBC 308   TRANSFERRIN 207   FERRITIN 515.30*         Lab 04/27/25  1442   FIO2 21   CARBOXYHEMOGLOBIN (VENOUS) 1.2     Brief Urine Lab Results  (Last result in the past 365 days)        Color   Clarity   Blood   Leuk Est   Nitrite   Protein   CREAT   Urine HCG        04/28/25 1609 Yellow   Cloudy   Trace   Negative   Negative   Negative           04/28/25 1609             58.2                 Microbiology Results Abnormal       None            No radiology results from the last 24 hrs        Current medications:  Scheduled Meds:amLODIPine, 10 mg, Oral, Q24H  atorvastatin, 10 mg, Oral, Nightly  diazePAM, 6 mg, Oral, Q8H  folic  acid 1 mg in sodium chloride 0.9 % 50 mL IVPB, 1 mg, Intravenous, Daily  insulin glargine, 10 Units, Subcutaneous, Daily  insulin lispro, 2-9 Units, Subcutaneous, 4x Daily AC & at Bedtime  lisinopril, 20 mg, Oral, Q24H  mupirocin, 1 Application, Each Nare, BID  pantoprazole, 40 mg, Oral, Q AM  senna-docusate sodium, 2 tablet, Oral, BID  sodium chloride, 10 mL, Intravenous, Q12H      Continuous Infusions:   PRN Meds:.  senna-docusate sodium **AND** polyethylene glycol **AND** bisacodyl **AND** bisacodyl    dextrose    dextrose    glucagon (human recombinant)    hydrALAZINE    HYDROcodone-acetaminophen    HYDROmorphone    ipratropium-albuterol    labetalol    LORazepam    LORazepam    nitroglycerin    Phosphorus Replacement - Follow Nurse / BPA Driven Protocol    Sodium Chloride (PF)    sodium chloride    sodium chloride    Assessment & Plan   Assessment & Plan     Active Hospital Problems    Diagnosis  POA    **Seizure [R56.9]  Yes    Alcohol withdrawal [F10.939]  Yes    Closed fracture of proximal end of right humerus [S42.201A]  Yes    Stage 3a chronic kidney disease [N18.31]  Yes    Type 2 diabetes mellitus [E11.9]  Yes    Mixed hyperlipidemia [E78.2]  Yes    Essential hypertension [I10]  Yes      Resolved Hospital Problems   No resolved problems to display.        Brief Hospital Course to date:  Juan C Castro is a 55 y.o. male with h/o T2DM, HTN, HL, CKD, etoh use, takes ativan that is not prescribed to him, presented to ER on 4/27/25 for HTN, nausea, chills, and tachypnea.    Had significantly elevated BP with a witnessed seizure in the ER.    He became combative in ER requiring physical restraint and subsequent pain and bruising of RUE.    CT RUE showed comminuted proximal humerus fracture.    Neurology evaluated the patient and treated him with scheduled ativan.    He was also found to be in DKA with only prior history of prediabetes.    Was initially admitted to ICU.  Note that patient apparently  lost his job and stopped getting refills on BP meds and other meds.       Seizure  --MRI brain showed no acute  --seen by neurology who diagnosed as new onset generalized tonic clonic seizure in setting of severe HTN, suspected DKA, sleep deprivation, daily etoh and street ativan.    --Neuro recommends to wean valium and outpatient neurology follow-up  --NO DRIVING x 90 Days     HTN Urgency, improved  --required cardene gtt now weaned off  --Continue lisinopril and amlodipine     DKA  --resolved  --A1C was 6.60 on 4/27  --seen by diabetes educator .  Needs diabetic testing equipment at discharge  --continue lantus 10units qhs and SSI     HL  -- Continue statin     Close Fracture of Proximal Right Humerus  --seen by ortho/Dr. Nieto who recommended shoulder arthroplasty d/t the morphology of the fracture.  He is neurovascularly intact therefore determined not emergent and Dr. Bonner recommends follow-up with  outpatient for surgery.    Ice and sling for comfort for now     DAKSHA on CKD  --nephro following  --renal u/s normal     Etoh WD  -- Wean scheduled Valium. Decrease Valium to 6 mg every 8 hours from 10 mg every 6 hours.  --seen by addiction medicine       Expected Discharge Location and Transportation: home  Expected Discharge   Expected Discharge Date: 5/2/2025; Expected Discharge Time:      VTE Prophylaxis:  Mechanical VTE prophylaxis orders are present.         AM-PAC 6 Clicks Score (PT): 24 (04/30/25 0800)    CODE STATUS:   Code Status and Medical Interventions: CPR (Attempt to Resuscitate); Full Support   Ordered at: 04/28/25 1222     Code Status (Patient has no pulse and is not breathing):    CPR (Attempt to Resuscitate)     Medical Interventions (Patient has pulse or is breathing):    Full Support       Berkley Valdez, DO  05/01/25

## 2025-05-02 ENCOUNTER — TELEPHONE (OUTPATIENT)
Dept: NEUROLOGY | Facility: CLINIC | Age: 55
End: 2025-05-02
Payer: COMMERCIAL

## 2025-05-02 ENCOUNTER — READMISSION MANAGEMENT (OUTPATIENT)
Dept: CALL CENTER | Facility: HOSPITAL | Age: 55
End: 2025-05-02
Payer: COMMERCIAL

## 2025-05-02 VITALS
TEMPERATURE: 98 F | RESPIRATION RATE: 18 BRPM | BODY MASS INDEX: 20.63 KG/M2 | HEIGHT: 72 IN | HEART RATE: 82 BPM | SYSTOLIC BLOOD PRESSURE: 150 MMHG | DIASTOLIC BLOOD PRESSURE: 88 MMHG | OXYGEN SATURATION: 94 % | WEIGHT: 152.34 LBS

## 2025-05-02 LAB
ALBUMIN SERPL-MCNC: 3.9 G/DL (ref 3.5–5.2)
ANION GAP SERPL CALCULATED.3IONS-SCNC: 12 MMOL/L (ref 5–15)
BACTERIA SPEC AEROBE CULT: NORMAL
BACTERIA SPEC AEROBE CULT: NORMAL
BASOPHILS # BLD AUTO: 0.06 10*3/MM3 (ref 0–0.2)
BASOPHILS NFR BLD AUTO: 0.6 % (ref 0–1.5)
BUN SERPL-MCNC: 15 MG/DL (ref 6–20)
BUN/CREAT SERPL: 12.6 (ref 7–25)
CALCIUM SPEC-SCNC: 9.4 MG/DL (ref 8.6–10.5)
CHLORIDE SERPL-SCNC: 97 MMOL/L (ref 98–107)
CO2 SERPL-SCNC: 25 MMOL/L (ref 22–29)
CREAT SERPL-MCNC: 1.19 MG/DL (ref 0.76–1.27)
DEPRECATED RDW RBC AUTO: 42.5 FL (ref 37–54)
EGFRCR SERPLBLD CKD-EPI 2021: 72.6 ML/MIN/1.73
EOSINOPHIL # BLD AUTO: 0.43 10*3/MM3 (ref 0–0.4)
EOSINOPHIL NFR BLD AUTO: 4.3 % (ref 0.3–6.2)
ERYTHROCYTE [DISTWIDTH] IN BLOOD BY AUTOMATED COUNT: 12.1 % (ref 12.3–15.4)
GLUCOSE BLDC GLUCOMTR-MCNC: 129 MG/DL (ref 70–130)
GLUCOSE BLDC GLUCOMTR-MCNC: 157 MG/DL (ref 70–130)
GLUCOSE SERPL-MCNC: 140 MG/DL (ref 65–99)
HCT VFR BLD AUTO: 48 % (ref 37.5–51)
HGB BLD-MCNC: 16.4 G/DL (ref 13–17.7)
IMM GRANULOCYTES # BLD AUTO: 0.03 10*3/MM3 (ref 0–0.05)
IMM GRANULOCYTES NFR BLD AUTO: 0.3 % (ref 0–0.5)
LYMPHOCYTES # BLD AUTO: 2.26 10*3/MM3 (ref 0.7–3.1)
LYMPHOCYTES NFR BLD AUTO: 22.8 % (ref 19.6–45.3)
MCH RBC QN AUTO: 32.5 PG (ref 26.6–33)
MCHC RBC AUTO-ENTMCNC: 34.2 G/DL (ref 31.5–35.7)
MCV RBC AUTO: 95.2 FL (ref 79–97)
MONOCYTES # BLD AUTO: 1.08 10*3/MM3 (ref 0.1–0.9)
MONOCYTES NFR BLD AUTO: 10.9 % (ref 5–12)
NEUTROPHILS NFR BLD AUTO: 6.05 10*3/MM3 (ref 1.7–7)
NEUTROPHILS NFR BLD AUTO: 61.1 % (ref 42.7–76)
NRBC BLD AUTO-RTO: 0 /100 WBC (ref 0–0.2)
PHOSPHATE SERPL-MCNC: 3.3 MG/DL (ref 2.5–4.5)
PLATELET # BLD AUTO: 254 10*3/MM3 (ref 140–450)
PMV BLD AUTO: 10.4 FL (ref 6–12)
POTASSIUM SERPL-SCNC: 4.2 MMOL/L (ref 3.5–5.2)
RBC # BLD AUTO: 5.04 10*6/MM3 (ref 4.14–5.8)
SODIUM SERPL-SCNC: 134 MMOL/L (ref 136–145)
WBC NRBC COR # BLD AUTO: 9.91 10*3/MM3 (ref 3.4–10.8)

## 2025-05-02 PROCEDURE — 85025 COMPLETE CBC W/AUTO DIFF WBC: CPT | Performed by: INTERNAL MEDICINE

## 2025-05-02 PROCEDURE — 63710000001 INSULIN GLARGINE PER 5 UNITS: Performed by: INTERNAL MEDICINE

## 2025-05-02 PROCEDURE — 25010000002 FOLIC ACID 5 MG/ML SOLUTION 10 ML VIAL

## 2025-05-02 PROCEDURE — 80069 RENAL FUNCTION PANEL: CPT | Performed by: INTERNAL MEDICINE

## 2025-05-02 PROCEDURE — 99232 SBSQ HOSP IP/OBS MODERATE 35: CPT | Performed by: INTERNAL MEDICINE

## 2025-05-02 PROCEDURE — 25010000002 HYDROMORPHONE PER 4 MG: Performed by: PSYCHIATRY & NEUROLOGY

## 2025-05-02 PROCEDURE — 63710000001 INSULIN LISPRO (HUMAN) PER 5 UNITS: Performed by: INTERNAL MEDICINE

## 2025-05-02 PROCEDURE — 82948 REAGENT STRIP/BLOOD GLUCOSE: CPT

## 2025-05-02 RX ORDER — OXYCODONE AND ACETAMINOPHEN 7.5; 325 MG/1; MG/1
1 TABLET ORAL EVERY 4 HOURS PRN
Refills: 0 | Status: DISCONTINUED | OUTPATIENT
Start: 2025-05-02 | End: 2025-05-02 | Stop reason: HOSPADM

## 2025-05-02 RX ORDER — LISINOPRIL 20 MG/1
20 TABLET ORAL
Qty: 30 TABLET | Refills: 0 | Status: SHIPPED | OUTPATIENT
Start: 2025-05-03 | End: 2025-06-02

## 2025-05-02 RX ORDER — AMLODIPINE BESYLATE 10 MG/1
10 TABLET ORAL
Qty: 30 TABLET | Refills: 0 | Status: SHIPPED | OUTPATIENT
Start: 2025-05-03 | End: 2025-06-02

## 2025-05-02 RX ORDER — OXYCODONE AND ACETAMINOPHEN 7.5; 325 MG/1; MG/1
1 TABLET ORAL EVERY 4 HOURS PRN
Qty: 15 TABLET | Refills: 0 | Status: SHIPPED | OUTPATIENT
Start: 2025-05-02 | End: 2025-05-05 | Stop reason: SDUPTHER

## 2025-05-02 RX ORDER — DIAZEPAM 2 MG/1
4 TABLET ORAL EVERY 12 HOURS
Status: DISCONTINUED | OUTPATIENT
Start: 2025-05-02 | End: 2025-05-02 | Stop reason: HOSPADM

## 2025-05-02 RX ADMIN — PANTOPRAZOLE SODIUM 40 MG: 40 TABLET, DELAYED RELEASE ORAL at 06:12

## 2025-05-02 RX ADMIN — INSULIN LISPRO 2 UNITS: 100 INJECTION, SOLUTION INTRAVENOUS; SUBCUTANEOUS at 11:56

## 2025-05-02 RX ADMIN — HYDROMORPHONE HYDROCHLORIDE 0.5 MG: 1 INJECTION, SOLUTION INTRAMUSCULAR; INTRAVENOUS; SUBCUTANEOUS at 06:19

## 2025-05-02 RX ADMIN — INSULIN GLARGINE 10 UNITS: 100 INJECTION, SOLUTION SUBCUTANEOUS at 09:32

## 2025-05-02 RX ADMIN — DIAZEPAM 6 MG: 2 TABLET ORAL at 03:11

## 2025-05-02 RX ADMIN — AMLODIPINE BESYLATE 10 MG: 10 TABLET ORAL at 09:32

## 2025-05-02 RX ADMIN — MUPIROCIN 1 APPLICATION: 20 OINTMENT TOPICAL at 09:33

## 2025-05-02 RX ADMIN — FOLIC ACID 1 MG: 5 INJECTION, SOLUTION INTRAMUSCULAR; INTRAVENOUS; SUBCUTANEOUS at 09:32

## 2025-05-02 RX ADMIN — DIAZEPAM 4 MG: 2 TABLET ORAL at 09:37

## 2025-05-02 RX ADMIN — Medication 10 ML: at 09:32

## 2025-05-02 RX ADMIN — HYDROCODONE BITARTRATE AND ACETAMINOPHEN 1 TABLET: 7.5; 325 TABLET ORAL at 09:37

## 2025-05-02 RX ADMIN — LISINOPRIL 20 MG: 20 TABLET ORAL at 09:32

## 2025-05-02 NOTE — PROGRESS NOTES
"   LOS: 5 days    Patient Care Team:  Provider, No Known as PCP - General    Chief Complaint: Nausea, HTN and SOA    Subjective     Interval History:     No acute events overnight. No new complaints. Stable renal function.       Review of Systems:         Objective     Vital Sign Min/Max for last 24 hours  Temp  Min: 98 °F (36.7 °C)  Max: 98.9 °F (37.2 °C)   BP  Min: 135/77  Max: 168/93   Pulse  Min: 77  Max: 83   Resp  Min: 18  Max: 18   SpO2  Min: 93 %  Max: 95 %   No data recorded   No data recorded     Flowsheet Rows      Flowsheet Row First Filed Value   Admission Height 182.9 cm (72\") Documented at 04/27/2025 1134   Admission Weight 102 kg (225 lb) Documented at 04/27/2025 1134            No intake/output data recorded.  I/O last 3 completed shifts:  In: 240 [P.O.:240]  Out: 3275 [Urine:3275]    Physical Exam:    Gen: Alert, NAD   HENT: NC, AT, EOMI   NECK: Supple, no JVD, Trachea midline   LUNGS: CTA bilaterally, non labored respirtation   CVS: S1/S2 audible, RRR, no murmur   Abd: Soft, NT, ND, BS+   Ext: No pedal edema, no cyanosis   CNS: Alert, No focal deficit noted grossly  Psy: Cooperative  Skin: Warm, dry and intact      WBC WBC   Date Value Ref Range Status   05/02/2025 9.91 3.40 - 10.80 10*3/mm3 Final      HGB Hemoglobin   Date Value Ref Range Status   05/02/2025 16.4 13.0 - 17.7 g/dL Final      HCT Hematocrit   Date Value Ref Range Status   05/02/2025 48.0 37.5 - 51.0 % Final      Platlets No results found for: \"LABPLAT\"   MCV MCV   Date Value Ref Range Status   05/02/2025 95.2 79.0 - 97.0 fL Final          Sodium Sodium   Date Value Ref Range Status   05/02/2025 134 (L) 136 - 145 mmol/L Final      Potassium Potassium   Date Value Ref Range Status   05/02/2025 4.2 3.5 - 5.2 mmol/L Final      Chloride Chloride   Date Value Ref Range Status   05/02/2025 97 (L) 98 - 107 mmol/L Final      CO2 CO2   Date Value Ref Range Status   05/02/2025 25.0 22.0 - 29.0 mmol/L Final      BUN BUN   Date Value Ref Range " "Status   05/02/2025 15 6 - 20 mg/dL Final      Creatinine Creatinine   Date Value Ref Range Status   05/02/2025 1.19 0.76 - 1.27 mg/dL Final      Calcium Calcium   Date Value Ref Range Status   05/02/2025 9.4 8.6 - 10.5 mg/dL Final      PO4 No results found for: \"CAPO4\"   Albumin Albumin   Date Value Ref Range Status   05/02/2025 3.9 3.5 - 5.2 g/dL Final        Magnesium No results found for: \"MG\"     Uric Acid No results found for: \"URICACID\"       Results from last 7 days   Lab Units 05/02/25  0634 04/29/25  0452 04/28/25  1258 04/28/25  0850 04/28/25  0428 04/27/25  2107 04/27/25  1633 04/27/25  1230 04/27/25  1229   SODIUM mmol/L 134* 132*  --  132* 125*   < > 126*  --  125*   POTASSIUM mmol/L 4.2 4.6 4.2 4.1 4.1   < > 3.9  --  3.9   CHLORIDE mmol/L 97* 100  --  103 101   < > 94*  --  89*   CO2 mmol/L 25.0 21.0*  --  17.0* 11.0*   < > 15.0*  --  17.0*   BUN mg/dL 15 11  --  11 11   < > 10  --  9   CREATININE mg/dL 1.19 1.19  --  1.47* 1.43*   < > 1.51*  --  1.16   CALCIUM mg/dL 9.4 8.3*  --  8.0* 7.7*   < > 8.1*  --  9.1   ALBUMIN g/dL 3.9  --   --   --   --   --   --   --  4.7   WBC 10*3/mm3 9.91 14.56*  --   --  20.20*  --  26.30*   < >  --    HEMOGLOBIN g/dL 16.4 15.6  --   --  18.0*  --  18.4*   < >  --    PLATELETS 10*3/mm3 254 178  --   --  196  --  223   < >  --    GLUCOSE mg/dL 140* 181*  --  171* 169*   < > 309*  --  240*   PHOSPHORUS mg/dL 3.3 2.1*  --   --   --   --   --   --  1.8*    < > = values in this interval not displayed.              Results Review:     I reviewed the patient's new clinical results.    amLODIPine, 10 mg, Oral, Q24H  atorvastatin, 10 mg, Oral, Nightly  diazePAM, 4 mg, Oral, Q12H  folic acid 1 mg in sodium chloride 0.9 % 50 mL IVPB, 1 mg, Intravenous, Daily  insulin glargine, 10 Units, Subcutaneous, Daily  insulin lispro, 2-9 Units, Subcutaneous, 4x Daily AC & at Bedtime  lisinopril, 20 mg, Oral, Q24H  mupirocin, 1 Application, Each Nare, BID  pantoprazole, 40 mg, Oral, Q " AM  senna-docusate sodium, 2 tablet, Oral, BID  sodium chloride, 10 mL, Intravenous, Q12H         Findings:  The right kidney measures 11.2 x 5.8 x 5.8 cm in length and the left kidney measures 11.9 x 6.3 x 5.2 cm in length     Kidneys demonstrate normal cortical echogenicity.  No hydronephrosis or intrarenal stones.  No focal lesions.     Bladder is incompletely distended. Minimal debris noted within the bladder.     IMPRESSION:     1. Unremarkable ultrasound of the kidneys.     2. Some minimal debris noted within the bladder. Please correlate with urinalys    Medication Review: yes    Assessment & Plan       Seizure    Essential hypertension    Mixed hyperlipidemia    Alcohol withdrawal    Closed fracture of proximal end of right humerus    Stage 3a chronic kidney disease    Type 2 diabetes mellitus      1- DAKSHA on CKD - Pre-renal azotemia vs DAKSHA secondary to Hypertensive crisis. UA + protein, glucose and RBC 3-5. Improving   2- CKD - baseline Scr 1.1-1.3 range. UPCR 0.21  3- Metabolic acidosis - improving.   4- Hyponatremia - corrected for hyperglycemia -134  5- Hypophosphatemia - improving.   6- HTN encephalopathy      Plan  -  Stable for discharge from renal standpoint. Ok to resume ACE I on discharge. Discussed with patient to avoid NSAIDs. Ensure adequate hydration.     F/u in renal clinic in 1-2 months on PRN basis.   -    Michael Fernandez MD  05/02/25  17:00 EDT

## 2025-05-02 NOTE — TELEPHONE ENCOUNTER
Caller: SARAH    Relationship to patient:  [facility]F    Best call back number: CALL PT TO HOWARD    New or established patient?  [x] New  [] Established    Date of discharge: 5-2-25    Facility discharged from: [address]    Diagnosis/Symptoms: NEW ONSET SEIZURE      Specialty Only: Did you see a [facility] provider?    [x] Yes  [] No  If so, who? Sanchez Schneider MD     Additional Details: APPOINTMENT(8-25-25) FALLS OUTSIDE OF SUGGESTED TIMEFRAME OF TWO MONTHS. PLEASE REVIEW AND ADVISE

## 2025-05-02 NOTE — DISCHARGE PLACEMENT REQUEST
"Redd Castro (55 y.o. Male)   Colton Celestin, RN Case Manager  [phone]  Patient needs an appointment with Dr. Moreno Peres MD      Date of Birth   1970    Social Security Number       Address   [address] [place], [place] [address]  [facility] [address]    Home Phone   [phone]    MRN   [id number]       [facility]    Marital Status   Single                            Admission Date   4/27/2025    Admission Type   Emergency    Admitting Provider   Berkley Valdez DO    Attending Provider   Berkley Valdez DO    Department, Room/Bed   [facility] 3F, S317/1       Discharge Date       Discharge Disposition       Discharge Destination                                 Attending Provider: Berkley Valdez DO    Allergies: No Known Allergies    Isolation: None   Infection: None   Code Status: CPR    Ht: 182.9 cm (72\")   Wt: 69.1 kg (152 lb 5.4 oz)    Admission Cmt: None   Principal Problem: Seizure [R56.9]                   Active Insurance as of 4/27/2025       Primary Coverage       Payor Plan Insurance Group Employer/Plan Group    [id number] ANTH BLUE CR[employer] BLUE SHIELD PPO [id number]       Payor Plan Address Payor Plan Phone Number Payor Plan Fax Number Effective Dates    [address] [phone]  9/1/2020 - None Entered    [place] [address]         Subscriber Name Subscriber Birth Date Member ID       REDD CASTRO 1970 [id number]                     Emergency Contacts        (Rel.) Home Phone Work Phone Mobile Phone    Jocelyne Castro (Mother) -- -- [phone]    Albert Dennison (Significant Other) -- -- [phone]                 History & Physical        Tre Shaikh MD at 04/27/25 1548          H&P NOTE    Patient Identification:  Redd Castro  55 y.o.  male  1970  [id number]                CC: Nausea rapid breathing chills elevated blood pressure    History of Present Illness:  55-year-old " gentleman presented to the emergency room with nausea rapid breathing chills and elevated blood pressure.  He also reports reduced appetite for the last 1 month.  Apparently he lost his job and stopped getting refills on his blood pressure and other medications.  He reports that he has been borderline diabetic.  In the emergency room he was noted to have significantly elevated blood pressure with witnessed seizure.  The seizure was described to me as tonic seizure unresponsive for approximately 2 minutes with teeth tightly clenched.  He received benzodiazepine in the emergency room.  ER physician/ARNP spoke with neurologist Dr. Gonzalez who recommended no Keppra but use scheduled Ativan.  They also recommended stat EEG.  Patient was also noted to be in acute DKA and initiated on insulin drip per Glucomander protocol in the emergency room.  At the time of my evaluation patient was little sleepy but wakes up follows simple commands.  Denies any headache double vision or blurred vision.  Is currently requiring 2 L oxygen nasal cannula.  Some history of marijuana and other drug abuse as well.    Review of Systems  As above rest is negative  Past Medical History:  Past Medical History:   Diagnosis Date    Hyperlipidemia     Hypertension     Kidney disease        Past Surgical History:  Past Surgical History:   Procedure Laterality Date    KNEE ACL RECONSTRUCTION          Home Meds:  (Not in a hospital admission)      Allergies:  No Known Allergies    Social History:   Social History     Socioeconomic History    Marital status: Single   Tobacco Use    Smoking status: Every Day     Current packs/day: 0.50     Average packs/day: 0.5 packs/day for 30.0 years (15.0 ttl pk-yrs)     Types: Cigarettes    Smokeless tobacco: Never   Substance and Sexual Activity    Alcohol use: Yes       Family History:  Family History   Problem Relation Age of Onset    Diabetes Mother     Hypertension Mother     Heart attack Father     Heart  "attack Paternal Grandfather        Physical Exam:  BP (!) 183/108   Pulse 101   Temp 97.7 °F (36.5 °C) (Oral)   Resp 22   Ht 182.9 cm (72\")   Wt 102 kg (225 lb)   SpO2 94%   BMI 30.52 kg/m²  Body mass index is 30.52 kg/m². 94% 102 kg (225 lb)  Physical Exam  Patient is examined using the personal protective equipment as per guidelines from infection control for this particular patient as enacted.  Hand hygiene was performed before and after patient interaction.  Gentleman in no distress at this time  Eyes normal conjunctivae and pupils reactive to light  ENT normocephalic atraumatic  Neck midline trachea no thyromegaly  Chest no labored breathing clear  CVS regular rate and rhythm no lower extremity edema  Abdomen soft nontender no hepatosplenomegaly  CNS intact   Skin no rashes no nodules  Psych oriented to time place and person normal memory  Musculoskeletal no cyanosis no clubbing normal range of motion    LABS:  Lab Results   Component Value Date    CALCIUM 9.1 04/27/2025    PHOS 1.8 (C) 04/27/2025     Results from last 7 days   Lab Units 04/27/25  1230 04/27/25  1229   MAGNESIUM mg/dL  --  1.5*   SODIUM mmol/L  --  125*   POTASSIUM mmol/L  --  3.9   CHLORIDE mmol/L  --  89*   CO2 mmol/L  --  17.0*   BUN mg/dL  --  9   CREATININE mg/dL  --  1.16   GLUCOSE mg/dL  --  240*   CALCIUM mg/dL  --  9.1   WBC 10*3/mm3 18.63*  --    HEMOGLOBIN g/dL 20.4*  --    PLATELETS 10*3/mm3 235  --    ALT (SGPT) U/L  --  34   AST (SGOT) U/L  --  28     Lab Results   Component Value Date    TROPONINT 60 (C) 04/27/2025     Results from last 7 days   Lab Units 04/27/25  1443 04/27/25  1229   HSTROP T ng/L 60* 35*         Results from last 7 days   Lab Units 04/27/25  1229   LACTATE mmol/L 3.0*     Results from last 7 days   Lab Units 04/27/25  1442   MODALITY  Room Air     Results from last 7 days   Lab Units 04/27/25  1231   INFLUENZA B PCR  Not Detected   RSV, PCR  Not Detected             Lab Results   Component Value Date "    TSH 1.370 04/27/2025     Estimated Creatinine Clearance: 90 mL/min (by C-G formula based on SCr of 1.16 mg/dL).         Imaging: I personally visualized the images of scans/x-rays performed within last 3 days.      Assessment:  Hypertensive emergency  Hypertensive encephalopathy  Witnessed seizure  DKA  Hyponatremia  Leukocytosis  Hypophosphatemia  Hypomagnesemia  Lactic acidosis  Medical noncompliance  Marijuana/drug use    Recommendations:  *We have a gentleman with medical noncompliance out of his blood pressure medication with severely elevated blood pressure with witnessed seizure on chronic Ativan  Cardene drip initiated in the emergency room we will continue.  Will attempt to lower blood pressure slowly 20% from baseline acutely and monitor closely  Neurology has been consulted.  Initial CT head negative for acute intracranial pathology  EEG ordered per neurology.  Per neurology no antiseizure medications were recommended at this time.  I spoke with neurologist he wants to keep him on Ativan 1 mg every 8 hours until evaluated by them  Further neurowork-up per neurology  Urine drug screen has been ordered  Nephrology consult for fluid electrolyte management  Insulin drip per DKA protocol has been initiated in the emergency room  Trend lactate currently elevated likely related to seizure possibly.  No focal symptoms of infection as such.  Added stat procalcitonin  Blood cultures will be done  ICU core measures    Critical care time 45 minutes        Tre Shaikh MD  4/27/2025  15:48 EDT      Much of this encounter note is an electronic transcription/translation of spoken language to printed text using Dragon Software.    Electronically signed by Tre Shaikh MD at 04/27/25 1626       Vital Signs (last day)       Date/Time Temp Temp src Pulse Resp BP Patient Position SpO2    05/02/25 0733 98.7 (37.1) Oral 79 18 168/93 Lying --    05/02/25 0307 98.7 (37.1) Oral 78 18 149/88 Lying 95    05/01/25 2302 98.9  (37.2) Oral 81 18 135/77 Lying 93    05/01/25 1859 98.5 (36.9) Oral 77 18 151/75 Lying 94    05/01/25 1617 -- -- 84 18 163/92 Lying 96    05/01/25 1300 -- -- 81 -- -- -- --    05/01/25 1131 98.7 (37.1) Oral 71 18 168/97 Lying 93    05/01/25 0908 -- -- 82 -- 164/99 -- --    05/01/25 0731 98.7 (37.1) Oral 76 18 163/91 Lying 94    05/01/25 0400 -- -- 80 18 180/102 -- 95    05/01/25 0300 -- -- 80 16 167/91 Lying 93    05/01/25 0200 -- -- 79 18 167/91 -- 93    05/01/25 0100 -- -- 77 -- -- -- --    05/01/25 0010 -- -- 76 -- -- -- 94          Current Facility-Administered Medications   Medication Dose Route Frequency Provider Last Rate Last Admin    amLODIPine (NORVASC) tablet 10 mg  10 mg Oral Q24H Woody Mccullough MD   10 mg at 05/01/25 0908    atorvastatin (LIPITOR) tablet 10 mg  10 mg Oral Nightly Kandy Villalpando APRN   10 mg at 05/01/25 2002    sennosides-docusate (PERICOLACE) 8.6-50 MG per tablet 2 tablet  2 tablet Oral BID Tre Shaikh MD        And    polyethylene glycol (MIRALAX) packet 17 g  17 g Oral Daily PRN Tre Shaikh MD        And    bisacodyl (DULCOLAX) EC tablet 5 mg  5 mg Oral Daily PRN Tre Shaikh MD        And    bisacodyl (DULCOLAX) suppository 10 mg  10 mg Rectal Daily PRN Tre Shaikh MD        dextrose (D50W) (25 g/50 mL) IV injection 25 g  25 g Intravenous Q15 Min PRN Prebble, Matheus, RPH        dextrose (GLUTOSE) oral gel 15 g  15 g Oral Q15 Min PRN Prebble, Matheus, RPH        diazePAM (VALIUM) tablet 4 mg  4 mg Oral Q12H Gilbert, Berkley Kelly,         folic acid 1 mg in sodium chloride 0.9 % 50 mL IVPB  1 mg Intravenous Daily Elmira Krueger, APRN 100 mL/hr at 05/01/25 0908 1 mg at 05/01/25 0908    glucagon (GLUCAGEN) injection 1 mg  1 mg Intramuscular Q15 Min PRN Matheus Scanlon, [facility]        hydrALAZINE (APRESOLINE) tablet 25 mg  25 mg Oral Q8H PRN Juan C Alvares DO   25 mg at 04/30/25 2020    HYDROcodone-acetaminophen (NORCO) 7.5-325 MG per tablet 1 tablet   1 tablet Oral Q6H PRN Juan C Alvares, DO   1 tablet at 05/01/25 2002    insulin glargine (LANTUS, SEMGLEE) injection 10 Units  10 Units Subcutaneous Daily Juan C Alvares DO   10 Units at 05/01/25 0907    Insulin Lispro (humaLOG) injection 2-9 Units  2-9 Units Subcutaneous 4x Daily AC & at Bedtime Juan C Alvares DO   4 Units at 05/01/25 2126    ipratropium-albuterol (DUO-NEB) nebulizer solution 3 mL  3 mL Nebulization Q4H PRN Riaz Guzmán, APRN        labetalol (NORMODYNE,TRANDATE) injection 20 mg  20 mg Intravenous Q4H PRN Riaz Guzmán APRN   20 mg at 05/01/25 0448    lisinopril (PRINIVIL,ZESTRIL) tablet 20 mg  20 mg Oral Q24H Juan C Alvares, DO   20 mg at 05/01/25 0908    LORazepam (ATIVAN) injection 1 mg  1 mg Intravenous Q8H PRN Juan C Alvares, DO        mupirocin (BACTROBAN) 2 % nasal ointment 1 Application  1 Application Each Nare BID Tre Shaikh MD   1 Application at 05/01/25 2002    nitroglycerin (NITROSTAT) SL tablet 0.4 mg  0.4 mg Sublingual Q5 Min PRN Tre Shaikh MD        pantoprazole (PROTONIX) EC tablet 40 mg  40 mg Oral Q AM Kandy Villalpando APRN   40 mg at 05/02/25 0612    Phosphorus Replacement - Follow Nurse / BPA Driven Protocol   Not Applicable PRN Juan C Alvares, DO        Sodium Chloride (PF) 0.9 % 10 mL  10 mL Intravenous PRN Alfred Back MD        sodium chloride 0.9 % flush 10 mL  10 mL Intravenous Q12H Tre Shaikh MD   10 mL at 05/01/25 2002    sodium chloride 0.9 % flush 10 mL  10 mL Intravenous PRN Tre Shaikh MD        sodium chloride 0.9 % infusion 40 mL  40 mL Intravenous PRN Tre Shaikh MD            Physician Progress Notes (last 24 hours)        Michael Fernandez MD at 05/01/25 1420             LOS: 4 days    Patient Care Team:  Provider, No Known as PCP - General    Chief Complaint: Nausea, HTN and SOA    Subjective     Interval History:     No acute events overnight. No new  "complaints. No new labs.       Review of Systems:         Objective     Vital Sign Min/Max for last 24 hours  Temp  Min: 98.3 °F (36.8 °C)  Max: 98.8 °F (37.1 °C)   BP  Min: 155/84  Max: 181/97   Pulse  Min: 71  Max: 85   Resp  Min: 16  Max: 18   SpO2  Min: 92 %  Max: 95 %   No data recorded   Weight  Min: 69.1 kg (152 lb 5.4 oz)  Max: 69.1 kg (152 lb 5.4 oz)     Flowsheet Rows      Flowsheet Row First Filed Value   Admission Height 182.9 cm (72\") Documented at 04/27/2025 1134   Admission Weight 102 kg (225 lb) Documented at 04/27/2025 1134            I/O this shift:  In: -   Out: 825 [Urine:825]  I/O last 3 completed shifts:  In: 765 [P.O.:765]  Out: 7835 [Urine:7835]    Physical Exam:    Gen: Alert, NAD   HENT: NC, AT, EOMI   NECK: Supple, no JVD, Trachea midline   LUNGS: CTA bilaterally, non labored respirtation   CVS: S1/S2 audible, RRR, no murmur   Abd: Soft, NT, ND, BS+   Ext: No pedal edema, no cyanosis   CNS: Alert, No focal deficit noted grossly  Psy: Cooperative  Skin: Warm, dry and intact      WBC WBC   Date Value Ref Range Status   04/29/2025 14.56 (H) 3.40 - 10.80 10*3/mm3 Final      HGB Hemoglobin   Date Value Ref Range Status   04/29/2025 15.6 13.0 - 17.7 g/dL Final      HCT Hematocrit   Date Value Ref Range Status   04/29/2025 44.3 37.5 - 51.0 % Final      Platlets No results found for: \"LABPLAT\"   MCV MCV   Date Value Ref Range Status   04/29/2025 92.9 79.0 - 97.0 fL Final          Sodium Sodium   Date Value Ref Range Status   04/29/2025 132 (L) 136 - 145 mmol/L Final      Potassium Potassium   Date Value Ref Range Status   04/29/2025 4.6 3.5 - 5.2 mmol/L Final     Comment:     Slight hemolysis detected by analyzer. Result may be falsely elevated.      Chloride Chloride   Date Value Ref Range Status   04/29/2025 100 98 - 107 mmol/L Final      CO2 CO2   Date Value Ref Range Status   04/29/2025 21.0 (L) 22.0 - 29.0 mmol/L Final      BUN BUN   Date Value Ref Range Status   04/29/2025 11 6 - 20 mg/dL " "Final      Creatinine Creatinine   Date Value Ref Range Status   04/29/2025 1.19 0.76 - 1.27 mg/dL Final      Calcium Calcium   Date Value Ref Range Status   04/29/2025 8.3 (L) 8.6 - 10.5 mg/dL Final      PO4 No results found for: \"CAPO4\"   Albumin No results found for: \"ALBUMIN\"     Magnesium Magnesium   Date Value Ref Range Status   04/29/2025 2.3 1.6 - 2.6 mg/dL Final      Uric Acid No results found for: \"URICACID\"       Results from last 7 days   Lab Units 04/29/25  0452 04/28/25  1258 04/28/25  0850 04/28/25  0428 04/28/25  0122 04/27/25  2107 04/27/25  1633 04/27/25  1230 04/27/25  1229   SODIUM mmol/L 132*  --  132* 125* 129*   < > 126*  --  125*   POTASSIUM mmol/L 4.6 4.2 4.1 4.1 3.5   < > 3.9  --  3.9   CHLORIDE mmol/L 100  --  103 101 101   < > 94*  --  89*   CO2 mmol/L 21.0*  --  17.0* 11.0* 15.0*   < > 15.0*  --  17.0*   BUN mg/dL 11  --  11 11 11   < > 10  --  9   CREATININE mg/dL 1.19  --  1.47* 1.43* 1.50*   < > 1.51*  --  1.16   CALCIUM mg/dL 8.3*  --  8.0* 7.7* 7.9*   < > 8.1*  --  9.1   ALBUMIN g/dL  --   --   --   --   --   --   --   --  4.7   WBC 10*3/mm3 14.56*  --   --  20.20*  --   --  26.30* 18.63*  --    HEMOGLOBIN g/dL 15.6  --   --  18.0*  --   --  18.4* 20.4*  --    PLATELETS 10*3/mm3 178  --   --  196  --   --  223 235  --    GLUCOSE mg/dL 181*  --  171* 169* 92   < > 309*  --  240*   PHOSPHORUS mg/dL 2.1*  --   --   --   --   --   --   --  1.8*    < > = values in this interval not displayed.              Results Review:     I reviewed the patient's new clinical results.    amLODIPine, 10 mg, Oral, Q24H  atorvastatin, 10 mg, Oral, Nightly  diazePAM, 6 mg, Oral, Q8H  folic acid 1 mg in sodium chloride 0.9 % 50 mL IVPB, 1 mg, Intravenous, Daily  insulin glargine, 10 Units, Subcutaneous, Daily  insulin lispro, 2-9 Units, Subcutaneous, 4x Daily AC & at Bedtime  lisinopril, 20 mg, Oral, Q24H  mupirocin, 1 Application, Each Nare, BID  pantoprazole, 40 mg, Oral, Q AM  senna-docusate sodium, 2 " tablet, Oral, BID  sodium chloride, 10 mL, Intravenous, Q12H         Findings:  The right kidney measures 11.2 x 5.8 x 5.8 cm in length and the left kidney measures 11.9 x 6.3 x 5.2 cm in length     Kidneys demonstrate normal cortical echogenicity.  No hydronephrosis or intrarenal stones.  No focal lesions.     Bladder is incompletely distended. Minimal debris noted within the bladder.     IMPRESSION:     1. Unremarkable ultrasound of the kidneys.     2. Some minimal debris noted within the bladder. Please correlate with urinalys    Medication Review: yes    Assessment & Plan       Seizure    Essential hypertension    Mixed hyperlipidemia    Alcohol withdrawal    Closed fracture of proximal end of right humerus    Stage 3a chronic kidney disease    Type 2 diabetes mellitus      1- DAKSHA on CKD - Pre-renal azotemia vs DAKSHA secondary to Hypertensive crisis. UA + protein, glucose and RBC 3-5. Improving   2- CKD - baseline Scr 1.1-1.3 range. UPCR 0.21  3- Metabolic acidosis - improving.   4- Hyponatremia - corrected for hyperglycemia -134  5- Hypophosphatemia - improving.   6- HTN encephalopathy      Plan  - Check labs in AM.   - Monitor I/O   - Avoid nephrotoxic agents.   - Renal diet   -    Michael Fernandez MD  05/01/25  14:20 EDT      Electronically signed by Michael Fernandez MD at 05/01/25 3750       Physical Therapy Notes (most recent note)    No notes exist for this encounter.

## 2025-05-02 NOTE — CASE MANAGEMENT/SOCIAL WORK
Case Management Discharge Note      Final Note: Home with family. Family will transport. Dr Prees's office with  Sports Medicine will call pt with an appointment.         Selected Continued Care - Admitted Since 4/27/2025       Destination    No services have been selected for the patient.                Durable Medical Equipment    No services have been selected for the patient.                Dialysis/Infusion    No services have been selected for the patient.                Home Medical Care    No services have been selected for the patient.                Therapy    No services have been selected for the patient.                Community Resources    No services have been selected for the patient.                Community & DME    No services have been selected for the patient.                         Final Discharge Disposition Code: 01 - home or self-care

## 2025-05-02 NOTE — DISCHARGE SUMMARY
[facility] Medicine Services  DISCHARGE SUMMARY    Patient Name: Juan C Castro  : 1970  MRN: [id number]    Date of Admission: 2025 12:04 PM  Date of Discharge: 2025  Primary Care Physician: Provider, No Known    Consults       Date and Time Order Name Status Description    2025  7:28 PM Inpatient Orthopedic Surgery Consult      2025  3:53 PM Inpatient Nephrology Consult Completed     2025  3:08 PM Inpatient Neurology Consult General Completed             Hospital Course     Presenting Problem: Seizure    Active Hospital Problems    Diagnosis  POA    **Seizure [R56.9]  Yes    Alcohol withdrawal [F10.939]  Yes    Closed fracture of proximal end of right humerus [S42.201A]  Yes    Stage 3a chronic kidney disease [N18.31]  Yes    Type 2 diabetes mellitus [E11.9]  Yes    Mixed hyperlipidemia [E78.2]  Yes    Essential hypertension [I10]  Yes      Resolved Hospital Problems   No resolved problems to display.          Hospital Course:  Juan C Castro is a 55 y.o. male with h/o T2DM, HTN, HL, CKD, etoh use, takes ativan that is not prescribed to him, presented to ER on 25 for HTN, nausea, chills, and tachypnea.    Had significantly elevated BP with a witnessed seizure in the ER.    He became combative in ER requiring physical restraint and subsequent pain and bruising of RUE.    CT RUE showed comminuted proximal humerus fracture.    Neurology evaluated the patient and treated him with scheduled ativan.    He was also found to be in DKA with only prior history of prediabetes.    Was initially admitted to ICU.  Note that patient apparently lost his job and stopped getting refills on BP meds and other meds.  He was transferred to telemetry for and evaluated by orthopedics.     Seizure  --MRI brain showed no acute abnormality  --seen by neurology who diagnosed as new onset generalized tonic clonic seizure in setting of severe HTN, suspected DKA,  sleep deprivation, daily etoh and street ativan.    -- Wean Valium, no prescription given at DC  --NO DRIVING x 90 Days     HTN Urgency, improved  --required cardene gtt now weaned off  --Continue lisinopril and amlodipine     DKA  --resolved  --A1C was 6.60 on 4/27       HL  -- Continue statin     Close Fracture of Proximal Right Humerus  --seen by orthowho recommended shoulder arthroplasty d/t the morphology of the fracture.  He is neurovascularly intact therefore determined not emergent and Dr. Bonner recommends follow-up with  outpatient for surgery.    Ice and sling for comfort for now.  Have sent referral for  [facility], clinic is supposed to call patient with appointment  - Percocet ordered for 3 days for pain control     DAKSHA on CKD  --nephro evaluated  --renal u/s normal     Etoh WD  -- Valium weaned needs follow-up with PCP      Discharge Follow Up Recommendations for outpatient labs/diagnostics:  PCP this week    Day of Discharge     HPI:   See progress note on same day    Review of Systems      Vital Signs:   Temp:  [98 °F (36.7 °C)-98.9 °F (37.2 °C)] 98 °F (36.7 °C)  Heart Rate:  [77-84] 83  Resp:  [18] 18  BP: (135-168)/(75-93) 151/90      Physical Exam:  Constitutional: Mildly anxious  HENT: NCAT, mucous membranes moist  Respiratory: Clear to auscultation bilaterally, respiratory effort normal   Cardiovascular: RRR, no murmurs, rubs, or gallops  Gastrointestinal: soft nontender  Musculoskeletal: Right upper extremity swelling  Psychiatric: Appropriate affect, cooperative  Neurologic: Oriented x 3, speech clear  Skin: No rashes      Pertinent  and/or Most Recent Results     LAB RESULTS:      Lab 05/02/25  0634 04/29/25  0452 04/28/25  1258 04/28/25  0428 04/27/25  2107 04/27/25  1844 04/27/25  1633 04/27/25  1549 04/27/25  1443 04/27/25  1230 04/27/25  1229   WBC 9.91 14.56*  --  20.20*  --   --  26.30*  --   --  18.63*  --    HEMOGLOBIN 16.4 15.6  --  18.0*  --   --  18.4*  --   --   20.4*  --    HEMATOCRIT 48.0 44.3  --  55.0*  --   --  51.5*  --   --  56.8*  --    PLATELETS 254 178  --  196  --   --  223  --   --  235  --    NEUTROS ABS 6.05 11.41*  --  17.21*  --   --  23.93*  --   --  16.85*  --    IMMATURE GRANS (ABS) 0.03 0.06*  --  0.17*  --   --   --   --   --  0.09*  --    LYMPHS ABS 2.26 1.62  --  1.35  --   --   --   --   --  0.95  --    MONOS ABS 1.08* 1.36*  --  1.42*  --   --   --   --   --  0.69  --    EOS ABS 0.43* 0.08  --  0.01  --   --  0.00  --   --  0.00  --    MCV 95.2 92.9  --  99.8*  --   --  91.6  --   --  91.0  --    PROCALCITONIN  --   --   --   --   --   --   --   --  0.05  --   --    LACTATE  --   --   --   --  1.6 2.7*  --  10.1*  --   --  3.0*   LDH  --   --  423*  --   --   --   --   --   --   --   --          Lab 05/02/25  0634 04/29/25  0452 04/28/25  1258 04/28/25  0850 04/28/25  0428 04/28/25  0122 04/27/25  1633 04/27/25  1230 04/27/25  1229   SODIUM 134* 132*  --  132* 125* 129*   < >  --  125*   POTASSIUM 4.2 4.6 4.2 4.1 4.1 3.5   < >  --  3.9   CHLORIDE 97* 100  --  103 101 101   < >  --  89*   CO2 25.0 21.0*  --  17.0* 11.0* 15.0*   < >  --  17.0*   ANION GAP 12.0 11.0  --  12.0 13.0 13.0   < >  --  19.0*   BUN 15 11  --  11 11 11   < >  --  9   CREATININE 1.19 1.19  --  1.47* 1.43* 1.50*   < >  --  1.16   EGFR 72.6 72.6  --  56.3* 58.2* 55.0*   < >  --  74.8   GLUCOSE 140* 181*  --  171* 169* 92   < >  --  240*   CALCIUM 9.4 8.3*  --  8.0* 7.7* 7.9*   < >  --  9.1   MAGNESIUM  --  2.3  --   --  3.2*  --   --   --  1.5*   PHOSPHORUS 3.3 2.1*  --   --   --   --   --   --  1.8*   HEMOGLOBIN A1C  --   --   --   --   --   --   --  6.60*  --    TSH  --   --   --   --   --   --   --   --  1.370    < > = values in this interval not displayed.         Lab 05/02/25  0634 04/27/25  1229   TOTAL PROTEIN  --  8.5   ALBUMIN 3.9 4.7   GLOBULIN  --  3.8   ALT (SGPT)  --  34   AST (SGOT)  --  28   BILIRUBIN  --  0.6   ALK PHOS  --  135*   LIPASE  --  25         Lab  04/27/25  1443 04/27/25  1229   HSTROP T 60* 35*             Lab 04/28/25  1258   IRON 46*   IRON SATURATION (TSAT) 15*   TIBC 308   TRANSFERRIN 207   FERRITIN 515.30*         Lab 04/27/25  1442   FIO2 21   CARBOXYHEMOGLOBIN (VENOUS) 1.2     Brief Urine Lab Results  (Last result in the past 365 days)        Color   Clarity   Blood   Leuk Est   Nitrite   Protein   CREAT   Urine HCG        04/28/25 1609 Yellow   Cloudy   Trace   Negative   Negative   Negative           04/28/25 1609             58.2               Microbiology Results (last 10 days)       Procedure Component Value - Date/Time    Eosinophil Smear - Urine, Urine, Clean Catch [[id number]]  (Normal) Collected: 04/28/25 1609    Lab Status: Final result Specimen: Urine, Clean Catch Updated: 04/28/25 1707     Eosinophil Smear 0 % EOS/100 Cells     Narrative:      No eosinophil seen    Blood Culture - Blood, Hand, Right [[id number]]  (Normal) Collected: 04/27/25 1550    Lab Status: Preliminary result Specimen: Blood from Hand, Right Updated: 05/01/25 1615     Blood Culture No growth at 4 days    Blood Culture - Blood, Arm, Left [[id number]]  (Normal) Collected: 04/27/25 1540    Lab Status: Preliminary result Specimen: Blood from Arm, Left Updated: 05/01/25 1615     Blood Culture No growth at 4 days    COVID-19, FLU A/B, RSV PCR 1 HR TAT - Swab, Nasopharynx [[id number]]  (Normal) Collected: 04/27/25 1231    Lab Status: Final result Specimen: Swab from Nasopharynx Updated: 04/27/25 1345     COVID19 Not Detected     Influenza A PCR Not Detected     Influenza B PCR Not Detected     RSV, PCR Not Detected    Narrative:      Fact sheet for providers: https://www.fda.gov/media/843166/download    Fact sheet for patients: https://www.fda.gov/media/764214/download    Test performed by PCR.            US Renal Bilateral  Result Date: 4/29/2025  US RENAL BILATERAL Date of Exam: 4/28/2025 11:24 PM EDT Indication: DAKSHA. Comparison: No comparisons available. Technique: Grayscale  and color Doppler ultrasound evaluation of the kidneys and urinary bladder was performed. Findings: The right kidney measures 11.2 x 5.8 x 5.8 cm in length and the left kidney measures 11.9 x 6.3 x 5.2 cm in length Kidneys demonstrate normal cortical echogenicity.  No hydronephrosis or intrarenal stones.  No focal lesions. Bladder is incompletely distended. Minimal debris noted within the bladder.     1. Unremarkable ultrasound of the kidneys. 2. Some minimal debris noted within the bladder. Please correlate with urinalysis. Electronically Signed: Bk Torres MD  4/29/2025 6:08 AM EDT  Workstation ID: [id number]    CT Upper Extremity Right Without Contrast  Result Date: 4/28/2025  CT UPPER EXTREMITY RIGHT WO CONTRAST Date of Exam: 4/28/2025 11:51 AM EDT Indication: right proximal humerus fracture. Comparison: 4/27/2025 Technique: Axial CT images were obtained of the right upper extremity without contrast administration.  Reconstructed coronal and sagittal images were also obtained. Automated exposure control and iterative construction methods were used. Findings: There is a comminuted fracture of the proximal humerus. Fracture appears to involve the surgical and anatomic necks and greater and lesser tuberosities. The primary humeral head component involving the majority of the articular surface is rotated posteriorly relative to the humeral shaft, and dislocated from the glenoid articular surface. The humeral head articular surface projects posteriorly. There is impaction at the surgical neck with proximal migration of the humeral shaft. There is comminution of the greater and lesser tuberosities which appear medially displaced relative to the expected locations. There are multiple small displaced fracture fragments, most notably at the anterior-lateral aspect at the level of the coracoid. Mineralization appears grossly normal. Acromioclavicular alignment appears grossly maintained. There appear to be moderate  degenerative changes at the acromioclavicular joint. There is some motion in the distal humerus. No other definite displaced fracture is identified. Mineralization appears grossly normal. There is suspected edema/hematoma in the surrounding soft tissues at the fracture site. Probable glenohumeral effusion. Rotator cuff tendons are not well visualized or evaluated on this exam. No significant muscle atrophy.     Impression: 1.Comminuted fracture of the proximal humerus. 2.The humeral head articular surface is rotated posteriorly and dislocated from the glenoid articular surface. 3.Impaction at the surgical neck with proximal migration of the humeral shaft. 4.Comminution of the greater and lesser tuberosities which appear medially displaced. Electronically Signed: Horacio Lopez  4/28/2025 3:53 PM EDT  Workstation ID: [id number]    EEG  Result Date: 4/28/2025  Reason for referral: 55 y.o.male with seizure Technical Summary:  A 19 channel digital EEG was performed using the international 10-20 placement system, including eye leads and EKG leads. Duration: 20 minutes Findings: The patient is awake.  A medium amplitude well-regulated 10 Hz posterior rhythm is present symmetrically and somewhat widespread over the posterior leads.  Intermixed theta and alpha activity is seen anteriorly.  Drowsiness and light sleep are seen with mild slowing of the tracing.  Photic stimulation does not change the background.  Hyperventilation is not performed. Video: Available Technical quality: Good Rhythm strip: Regular, 80 bpm SUMMARY: Normal EEG in the awake and lightly asleep states No focal features or epileptiform activity are seen     Normal study This report is transcribed using the Dragon dictation system.  [     MRI Brain With & Without Contrast  Result Date: 4/28/2025  MRI BRAIN W WO CONTRAST Date of Exam: 4/27/2025 11:14 PM EDT Indication: seizure.  Comparison: CT head 4/27/2025. Technique:  Routine multiplanar/multisequence  sequence images of the brain were obtained before and after the uneventful administration of 20 mL Multihance. Findings: There is no diffusion restriction to suggest acute infarct. There is no evidence of acute or chronic intracranial hemorrhage. There is a small focus of encephalomalacia and hemosiderin staining in the inferior left cerebellum, which likely corresponds to  a chronic lacunar infarct. No mass effect or midline shift. No abnormal extra-axial collections.  The major vascular flow voids appear intact. The basal ganglia, brainstem and cerebellum appear within normal limits.  Calvarial and superficial soft tissue signal is within normal limits.  Orbits appear unremarkable.  The paranasal sinuses and the mastoid air cells appear well aerated.  Midline structures are intact. No abnormal enhancement. No evidence of gray matter heterotopia. The hippocampus appears normal bilaterally. No evidence of mesial temporal sclerosis. No obvious cortical dysplasia or migrational abnormality.     Impression: 1.No acute intracranial abnormality. No epileptogenic focus identified. 2.Small chronic lacunar infarct in the inferior left cerebellum. Electronically Signed: Conrad Bruce MD  4/28/2025 1:32 AM EDT  Workstation ID: [id number]    XR Shoulder 2+ View Right  Result Date: 4/27/2025  XR SHOULDER 2+ VW RIGHT Date of Exam: 4/27/2025 6:00 PM EDT Indication: Shoulder pain Comparison: 4/27/2025 Findings: There is an acute comminuted fracture of the proximal humerus. The humeral head appears to be dislocated somewhat posteriorly. AC joint appears within normal limits. Visualized portion of the right ribs are within normal limits     Impression: Acute comminuted displaced fracture of the proximal humerus. Electronically Signed: Tyler Majano MD  4/27/2025 6:44 PM EDT  Workstation ID: [id number]    CT Head Without Contrast  Result Date: 4/27/2025  CT HEAD WO CONTRAST Date of Exam: 4/27/2025 2:17 PM EDT Indication: seizure.  Comparison: None available. Technique: Axial CT images were obtained of the head without contrast administration.  Automated exposure control and iterative construction methods were used. Findings: There is no evidence of acute intracranial hemorrhage, mass, midline shift, loss of gray-white matter differentiation, or extra-axial fluid collection.  There is normal ventricular volume. The basal cisterns are patent. No evidence of fracture.  The paranasal sinuses and mastoid air cells are predominantly well aerated. The orbits and included soft tissues show no acute abnormality.      Impression: No acute intracranial abnormality. Electronically Signed: Conrad Nicole MD  4/27/2025 2:56 PM EDT  Workstation ID: [id number]    XR Chest 1 View  Result Date: 4/27/2025  XR CHEST 1 VW Date of Exam: 4/27/2025 1:15 PM EDT Indication: Tachypnea Comparison: None available. Findings: The cardiomediastinal silhouette is within normal limits. Pulmonary vascularity appears normal. There is no focal airspace consolidation, pleural effusion, or pneumothorax. There are mild degenerative changes of the thoracic spine.     Impression: No acute cardiopulmonary abnormality. Electronically Signed: Sean Graf MD  4/27/2025 1:42 PM EDT  Workstation ID: [id number]                Plan for Follow-up of Pending Labs/Results:   Pending Labs       Order Current Status    Blood Culture - Blood, Arm, Left Preliminary result    Blood Culture - Blood, Hand, Right Preliminary result          Discharge Details        Discharge Medications        New Medications        Instructions Start Date   Accu-Chek Guide Me w/Device kit   Use to test blood glucose levels up to 4 (Four) Times a Day As Needed.      Accu-Chek Guide Test test strip  Generic drug: glucose blood   Use 1 each to test blood glucose levels up to 4 (Four) Times a Day As Needed.      Accu-Chek Softclix Lancets lancets   Use 1 each to test blood glucose levels up to 4 (Four) Times a Day As  Needed.      Alcohol Swabs pads   Apply one alcohol swab to injection site of skin immediately prior to insulin injection or sugar testing.             ASK your doctor about these medications        Instructions Start Date   aspirin 81 MG EC tablet   81 mg, Oral, Daily      atorvastatin 20 MG tablet  Commonly known as: LIPITOR   20 mg, Oral, Daily      lisinopril 30 MG tablet  Commonly known as: PRINIVIL,ZESTRIL   30 mg, Oral, Daily      LORazepam 0.5 MG tablet  Commonly known as: Ativan   0.5 mg, Oral, Every 8 Hours PRN      omeprazole 40 MG capsule  Commonly known as: priLOSEC   40 mg, Oral, Daily      ondansetron 4 MG tablet  Commonly known as: ZOFRAN   4 mg, Oral, Every 8 Hours PRN      sildenafil 25 MG tablet  Commonly known as: Viagra   25 mg, Oral, Daily PRN               No Known Allergies      Discharge Disposition:      Diet:  Hospital:  Diet Order   Procedures    Diet: Cardiac, Diabetic; Healthy Heart (2-3 Na+); Consistent Carbohydrate; Texture: Regular (IDDSI 7); Fluid Consistency: Thin (IDDSI 0)            Activity:      Restrictions or Other Recommendations:         CODE STATUS:    Code Status and Medical Interventions: CPR (Attempt to Resuscitate); Full Support   Ordered at: 04/28/25 1222     Code Status (Patient has no pulse and is not breathing):    CPR (Attempt to Resuscitate)     Medical Interventions (Patient has pulse or is breathing):    Full Support       No future appointments.              Berkley Valdez DO  05/02/25      Time Spent on Discharge:  I spent    minutes on this discharge activity which included: face-to-face encounter with the patient, reviewing the data in the system, coordination of the care with the nursing staff as well as consultants, documentation, and entering orders.

## 2025-05-02 NOTE — CASE MANAGEMENT/SOCIAL WORK
Continued Stay Note  [facility]     Patient Name: Juan C Castro  MRN: [id number]  Today's Date: 5/2/2025    Admit Date: 4/27/2025    Plan: Home with family   Discharge Plan       Row Name 05/02/25 0822       Plan    Plan Home with family    Patient/Family in Agreement with Plan yes    Plan Comments Spoke with Kong at [facility] and faxed report to [phone].  [facility] with schedule and appointment and call the patient with an appointment date and time. CM will continue to follow.    Final Discharge Disposition Code 01 - home or self-care                   Discharge Codes    No documentation.                 Expected Discharge Date and Time       Expected Discharge Date Expected Discharge Time    May 2, 2025               Calvin Celestin RN

## 2025-05-03 NOTE — OUTREACH NOTE
Prep Survey      Flowsheet Row Responses   [facility] [place] facility patient discharged from? [place]   Is LACE score < 7 ? No   Eligibility Not Eligible   What are the reasons patient is not eligible? Behavioral Health  [ETOH withdrawal]   Does the patient have one of the following disease processes/diagnoses(primary or secondary)? Other   Prep survey completed? Yes            Denise COTTO - Registered Nurse

## 2025-05-05 ENCOUNTER — HOSPITAL ENCOUNTER (EMERGENCY)
Facility: HOSPITAL | Age: 55
Discharge: HOME | End: 2025-05-05
Attending: EMERGENCY MEDICINE | Admitting: EMERGENCY MEDICINE
Payer: COMMERCIAL

## 2025-05-05 VITALS
WEIGHT: 220 LBS | OXYGEN SATURATION: 95 % | HEIGHT: 72 IN | TEMPERATURE: 97.9 F | BODY MASS INDEX: 29.8 KG/M2 | HEART RATE: 97 BPM | RESPIRATION RATE: 18 BRPM | SYSTOLIC BLOOD PRESSURE: 156 MMHG | DIASTOLIC BLOOD PRESSURE: 85 MMHG

## 2025-05-05 DIAGNOSIS — S42.401A CLOSED FRACTURE OF DISTAL END OF RIGHT HUMERUS, UNSPECIFIED FRACTURE MORPHOLOGY, INITIAL ENCOUNTER: ICD-10-CM

## 2025-05-05 DIAGNOSIS — M25.511 ACUTE PAIN OF RIGHT SHOULDER: Primary | ICD-10-CM

## 2025-05-05 PROCEDURE — 99283 EMERGENCY DEPT VISIT LOW MDM: CPT

## 2025-05-05 RX ORDER — OXYCODONE AND ACETAMINOPHEN 7.5; 325 MG/1; MG/1
1 TABLET ORAL EVERY 6 HOURS PRN
Qty: 12 TABLET | Refills: 0 | Status: SHIPPED | OUTPATIENT
Start: 2025-05-05 | End: 2025-05-08

## 2025-05-05 RX ORDER — OXYCODONE AND ACETAMINOPHEN 5; 325 MG/1; MG/1
1 TABLET ORAL ONCE
Refills: 0 | Status: COMPLETED | OUTPATIENT
Start: 2025-05-05 | End: 2025-05-05

## 2025-05-05 RX ADMIN — OXYCODONE HYDROCHLORIDE AND ACETAMINOPHEN 1 TABLET: 5; 325 TABLET ORAL at 14:04

## 2025-05-05 NOTE — ED PROVIDER NOTES
EMERGENCY DEPARTMENT ENCOUNTER    Pt Name: Juan C Castro  MRN: [id number]  Pt :   1970  Room Number:  24SF/24  Date of encounter:  2025  PCP: Provider, No Known  ED Provider: REINIER Rutherford    Historian: Patient    HPI:  Chief Complaint:  Rt shoulder pain    Context: Juan C Castro is a 55 y.o. male who presents to the ED c/o Rt shoulder pain.  Patient explains that he was in our facility recently.  He had a seizure.  At some point during his stay during seizure activity patient was advised that he fractured his shoulder.  Patient was seen by our orthopedic physicians in-house and it was determined that the patient due to the complexity of the injury needed to follow-up with .  Patient has an appointment scheduled with Moreno Peres.  Patient ran out of his pain meds.  He is here for a refill.  HPI     REVIEW OF SYSTEMS  A chief complaint appropriate review of systems was completed and is negative except as noted in the HPI.     PAST MEDICAL HISTORY  Past Medical History:   Diagnosis Date    Hyperlipidemia     Hypertension     Kidney disease        PAST SURGICAL HISTORY  Past Surgical History:   Procedure Laterality Date    COLONOSCOPY      KNEE ACL RECONSTRUCTION         FAMILY HISTORY  Family History   Problem Relation Age of Onset    Diabetes Mother     Hypertension Mother     Heart attack Father     Heart attack Paternal Grandfather        SOCIAL HISTORY  Social History     Socioeconomic History    Marital status: Single   Tobacco Use    Smoking status: Every Day     Current packs/day: 0.50     Average packs/day: 0.5 packs/day for 30.0 years (15.0 ttl pk-yrs)     Types: Cigarettes    Smokeless tobacco: Never   Vaping Use    Vaping status: Never Used   Substance and Sexual Activity    Alcohol use: Yes     Alcohol/week: 42.0 standard drinks of alcohol     Types: 42 Cans of beer per week     Comment: 6 beers per day if not more per pt    Drug use: Yes     Types: Marijuana        ALLERGIES  Patient has no known allergies.    PHYSICAL EXAM  Physical Exam  Vitals and nursing note reviewed.   Constitutional:       Appearance: Normal appearance. He is not ill-appearing or toxic-appearing.   HENT:      Head: Normocephalic and atraumatic.      Nose: Nose normal.      Mouth/Throat:      Mouth: Mucous membranes are moist.   Eyes:      Conjunctiva/sclera: Conjunctivae normal.   Cardiovascular:      Rate and Rhythm: Normal rate.      Pulses: Normal pulses.   Pulmonary:      Effort: Pulmonary effort is normal.      Breath sounds: Normal breath sounds.   Musculoskeletal:      Cervical back: Normal range of motion and neck supple.      Comments: Right shoulder pain patient is currently wearing a sling./ shoulder immobilizer.    Skin:     General: Skin is warm and dry.   Neurological:      General: No focal deficit present.      Mental Status: He is alert and oriented to person, place, and time.   Psychiatric:         Behavior: Behavior normal.           LAB RESULTS  Results for orders placed or performed during the hospital encounter of 04/27/25   ECG 12 Lead Other; high blood pressure    Collection Time: 04/27/25 11:46 AM   Result Value Ref Range    QT Interval 374 ms    QTC Interval 474 ms   Comprehensive Metabolic Panel    Collection Time: 04/27/25 12:29 PM    Specimen: Blood   Result Value Ref Range    Glucose 240 (H) 65 - 99 mg/dL    BUN 9 6 - 20 mg/dL    Creatinine 1.16 0.76 - 1.27 mg/dL    Sodium 125 (L) 136 - 145 mmol/L    Potassium 3.9 3.5 - 5.2 mmol/L    Chloride 89 (L) 98 - 107 mmol/L    CO2 17.0 (L) 22.0 - 29.0 mmol/L    Calcium 9.1 8.6 - 10.5 mg/dL    Total Protein 8.5 6.0 - 8.5 g/dL    Albumin 4.7 3.5 - 5.2 g/dL    ALT (SGPT) 34 1 - 41 U/L    AST (SGOT) 28 1 - 40 U/L    Alkaline Phosphatase 135 (H) 39 - 117 U/L    Total Bilirubin 0.6 0.0 - 1.2 mg/dL    Globulin 3.8 gm/dL    A/G Ratio 1.2 g/dL    BUN/Creatinine Ratio 7.8 7.0 - 25.0    Anion Gap 19.0 (H) 5.0 - 15.0 mmol/L    eGFR 74.8  >60.0 mL/min/1.73   Lipase    Collection Time: 04/27/25 12:29 PM    Specimen: Blood   Result Value Ref Range    Lipase 25 13 - 60 U/L   Lactic Acid, Plasma    Collection Time: 04/27/25 12:29 PM    Specimen: Blood   Result Value Ref Range    Lactate 3.0 (C) 0.5 - 2.0 mmol/L   High Sensitivity Troponin T    Collection Time: 04/27/25 12:29 PM    Specimen: Blood   Result Value Ref Range    HS Troponin T 35 (H) <22 ng/L   TSH    Collection Time: 04/27/25 12:29 PM    Specimen: Blood   Result Value Ref Range    TSH 1.370 0.270 - 4.200 uIU/mL   Magnesium    Collection Time: 04/27/25 12:29 PM    Specimen: Blood   Result Value Ref Range    Magnesium 1.5 (L) 1.6 - 2.6 mg/dL   Beta Hydroxybutyrate Quantitative    Collection Time: 04/27/25 12:29 PM    Specimen: Blood   Result Value Ref Range    Beta-Hydroxybutyrate Quant 0.668 (H) 0.020 - 0.270 mmol/L   Acetaminophen Level    Collection Time: 04/27/25 12:29 PM    Specimen: Blood   Result Value Ref Range    Acetaminophen <5.0 0.0 - 30.0 mcg/mL   Ethanol    Collection Time: 04/27/25 12:29 PM    Specimen: Blood   Result Value Ref Range    Ethanol <10 0 - 10 mg/dL   Salicylate Level    Collection Time: 04/27/25 12:29 PM    Specimen: Blood   Result Value Ref Range    Salicylate <0.3 <=30.0 mg/dL   Phosphorus    Collection Time: 04/27/25 12:29 PM    Specimen: Blood   Result Value Ref Range    Phosphorus 1.8 (C) 2.5 - 4.5 mg/dL   Osmolality, Serum    Collection Time: 04/27/25 12:29 PM    Specimen: Blood   Result Value Ref Range    Osmolality 277 275 - 295 mOsm/kg   Green Top (Gel)    Collection Time: 04/27/25 12:29 PM   Result Value Ref Range    Extra Tube Hold for add-ons.    Gold Top - SST    Collection Time: 04/27/25 12:29 PM   Result Value Ref Range    Extra Tube Hold for add-ons.    Gray Top    Collection Time: 04/27/25 12:29 PM   Result Value Ref Range    Extra Tube Hold for add-ons.    Light Blue Top    Collection Time: 04/27/25 12:29 PM   Result Value Ref Range    Extra Tube  Hold for add-ons.    CBC Auto Differential    Collection Time: 04/27/25 12:30 PM    Specimen: Blood   Result Value Ref Range    WBC 18.63 (H) 3.40 - 10.80 10*3/mm3    RBC 6.24 (H) 4.14 - 5.80 10*6/mm3    Hemoglobin 20.4 (H) 13.0 - 17.7 g/dL    Hematocrit 56.8 (H) 37.5 - 51.0 %    MCV 91.0 79.0 - 97.0 fL    MCH 32.7 26.6 - 33.0 pg    MCHC 35.9 (H) 31.5 - 35.7 g/dL    RDW 11.9 (L) 12.3 - 15.4 %    RDW-SD 39.8 37.0 - 54.0 fl    MPV 10.3 6.0 - 12.0 fL    Platelets 235 140 - 450 10*3/mm3    Neutrophil % 90.4 (H) 42.7 - 76.0 %    Lymphocyte % 5.1 (L) 19.6 - 45.3 %    Monocyte % 3.7 (L) 5.0 - 12.0 %    Eosinophil % 0.0 (L) 0.3 - 6.2 %    Basophil % 0.3 0.0 - 1.5 %    Immature Grans % 0.5 0.0 - 0.5 %    Neutrophils, Absolute 16.85 (H) 1.70 - 7.00 10*3/mm3    Lymphocytes, Absolute 0.95 0.70 - 3.10 10*3/mm3    Monocytes, Absolute 0.69 0.10 - 0.90 10*3/mm3    Eosinophils, Absolute 0.00 0.00 - 0.40 10*3/mm3    Basophils, Absolute 0.05 0.00 - 0.20 10*3/mm3    Immature Grans, Absolute 0.09 (H) 0.00 - 0.05 10*3/mm3    nRBC 0.0 0.0 - 0.2 /100 WBC   Hemoglobin A1c    Collection Time: 04/27/25 12:30 PM    Specimen: Blood   Result Value Ref Range    Hemoglobin A1C 6.60 (H) 4.80 - 5.60 %   Scan Slide    Collection Time: 04/27/25 12:30 PM    Specimen: Blood   Result Value Ref Range    RBC Morphology Normal Normal    WBC Morphology Normal Normal    Platelet Morphology Normal Normal   Lavender Top    Collection Time: 04/27/25 12:30 PM   Result Value Ref Range    Extra Tube hold for add-on    COVID-19, FLU A/B, RSV PCR 1 HR TAT - Swab, Nasopharynx    Collection Time: 04/27/25 12:31 PM    Specimen: Nasopharynx; Swab   Result Value Ref Range    COVID19 Not Detected Not Detected - Ref. Range    Influenza A PCR Not Detected Not Detected    Influenza B PCR Not Detected Not Detected    RSV, PCR Not Detected Not Detected   Blood Gas, Venous With Co-Ox    Collection Time: 04/27/25  2:42 PM    Specimen: Venous Blood   Result Value Ref Range    Site  Nurse/Dr Draw     pH, Venous 7.313 7.310 - 7.410 pH Units    pCO2, Venous 25.2 (L) 41.0 - 51.0 mm Hg    pO2, Venous 59.8 (H) 27.0 - 53.0 mm Hg    HCO3, Venous 12.8 (L) 22.0 - 28.0 mmol/L    Base Excess, Venous -11.0 (L) -2.0 - 2.0 mmol/L    Hemoglobin, Blood Gas 19.0 (H) 13.5 - 17.5 g/dL    Oxyhemoglobin Venous 87.4 %    Methemoglobin Venous 0.1 %    Carboxyhemoglobin Venous 1.2 %    CO2 Content 13.6 (L) 22 - 33 mmol/L    Temperature 37.0     Barometric Pressure for Blood Gas      Modality Room Air     FIO2 21 %    Rate 0 Breaths/minute    PIP 0 cmH2O    IPAP 0     EPAP 0     Notified Neli HILLS     Notified By [id number]     Notified Time 04/27/2025 14:43    High Sensitivity Troponin T 1Hr    Collection Time: 04/27/25  2:43 PM    Specimen: Blood   Result Value Ref Range    HS Troponin T 60 (C) <22 ng/L    Troponin T Numeric Delta 25 (C) Abnormal if >/=3 ng/L    Troponin T % Delta 71 (C) Abnormal if >/= 20%   Procalcitonin    Collection Time: 04/27/25  2:43 PM    Specimen: Blood   Result Value Ref Range    Procalcitonin 0.05 0.00 - 0.25 ng/mL   ECG 12 Lead Altered Mental Status    Collection Time: 04/27/25  2:43 PM   Result Value Ref Range    QT Interval 384 ms    QTC Interval 529 ms   POC Glucose Once    Collection Time: 04/27/25  3:18 PM    Specimen: Blood   Result Value Ref Range    Glucose 287 (H) 70 - 130 mg/dL   Blood Culture - Blood, Arm, Left    Collection Time: 04/27/25  3:40 PM    Specimen: Arm, Left; Blood   Result Value Ref Range    Blood Culture No growth at 5 days    STAT Lactic Acid, Reflex    Collection Time: 04/27/25  3:49 PM    Specimen: Blood   Result Value Ref Range    Lactate 10.1 (C) 0.5 - 2.0 mmol/L   Blood Culture - Blood, Hand, Right    Collection Time: 04/27/25  3:50 PM    Specimen: Hand, Right; Blood   Result Value Ref Range    Blood Culture No growth at 5 days    Basic Metabolic Panel    Collection Time: 04/27/25  4:33 PM    Specimen: Blood   Result Value Ref Range    Glucose 309 (H) 65  - 99 mg/dL    BUN 10 6 - 20 mg/dL    Creatinine 1.51 (H) 0.76 - 1.27 mg/dL    Sodium 126 (L) 136 - 145 mmol/L    Potassium 3.9 3.5 - 5.2 mmol/L    Chloride 94 (L) 98 - 107 mmol/L    CO2 15.0 (L) 22.0 - 29.0 mmol/L    Calcium 8.1 (L) 8.6 - 10.5 mg/dL    BUN/Creatinine Ratio 6.6 (L) 7.0 - 25.0    Anion Gap 17.0 (H) 5.0 - 15.0 mmol/L    eGFR 54.5 (L) >60.0 mL/min/1.73   CBC Auto Differential    Collection Time: 04/27/25  4:33 PM    Specimen: Blood   Result Value Ref Range    WBC 26.30 (H) 3.40 - 10.80 10*3/mm3    RBC 5.62 4.14 - 5.80 10*6/mm3    Hemoglobin 18.4 (H) 13.0 - 17.7 g/dL    Hematocrit 51.5 (H) 37.5 - 51.0 %    MCV 91.6 79.0 - 97.0 fL    MCH 32.7 26.6 - 33.0 pg    MCHC 35.7 31.5 - 35.7 g/dL    RDW 12.0 (L) 12.3 - 15.4 %    RDW-SD 40.5 37.0 - 54.0 fl    MPV 10.1 6.0 - 12.0 fL    Platelets 223 140 - 450 10*3/mm3   Manual Differential    Collection Time: 04/27/25  4:33 PM    Specimen: Blood   Result Value Ref Range    Neutrophil % 83.0 (H) 42.7 - 76.0 %    Lymphocyte % 4.0 (L) 19.6 - 45.3 %    Monocyte % 5.0 5.0 - 12.0 %    Eosinophil % 0.0 (L) 0.3 - 6.2 %    Basophil % 0.0 0.0 - 1.5 %    Bands %  8.0 (H) 0.0 - 5.0 %    Neutrophils Absolute 23.93 (H) 1.70 - 7.00 10*3/mm3    Lymphocytes Absolute 1.05 0.70 - 3.10 10*3/mm3    Monocytes Absolute 1.32 (H) 0.10 - 0.90 10*3/mm3    Eosinophils Absolute 0.00 0.00 - 0.40 10*3/mm3    Basophils Absolute 0.00 0.00 - 0.20 10*3/mm3    RBC Morphology Normal Normal    WBC Morphology Normal Normal    Platelet Morphology Normal Normal   POC Glucose Once    Collection Time: 04/27/25  4:47 PM    Specimen: Blood   Result Value Ref Range    Glucose 270 (H) 70 - 130 mg/dL   Urinalysis With Microscopic If Indicated (No Culture) - Urine, Clean Catch    Collection Time: 04/27/25  6:17 PM    Specimen: Urine, Clean Catch   Result Value Ref Range    Color, UA Yellow Yellow, Straw    Appearance, UA Clear Clear    pH, UA 5.5 5.0 - 8.0    Specific Gravity, UA 1.023 1.001 - 1.030    Glucose, UA  >=1000 mg/dL (3+) (A) Negative    Ketones, UA 15 mg/dL (1+) (A) Negative    Bilirubin, UA Negative Negative    Blood, UA Large (3+) (A) Negative    Protein, UA >=300 mg/dL (3+) (A) Negative    Leuk Esterase, UA Negative Negative    Nitrite, UA Negative Negative    Urobilinogen, UA 0.2 E.U./dL 0.2 - 1.0 E.U./dL   Urine Drug Screen - Urine, Clean Catch    Collection Time: 04/27/25  6:17 PM    Specimen: Urine, Clean Catch   Result Value Ref Range    THC, Screen, Urine Positive (A) Negative    Phencyclidine (PCP), Urine Negative Negative    Cocaine Screen, Urine Negative Negative    Methamphetamine, Ur Negative Negative    Opiate Screen Positive (A) Negative    Amphetamine Screen, Urine Negative Negative    Benzodiazepine Screen, Urine Positive (A) Negative    Tricyclic Antidepressants Screen Negative Negative    Methadone Screen, Urine Negative Negative    Barbiturates Screen, Urine Negative Negative    Oxycodone Screen, Urine Negative Negative    Buprenorphine, Screen, Urine Negative Negative   Osmolality, Urine - Urine, Clean Catch    Collection Time: 04/27/25  6:17 PM    Specimen: Urine, Clean Catch   Result Value Ref Range    Osmolality, Urine 555 300 - 1,100 mOsm/kg   Fentanyl, Urine - Urine, Clean Catch    Collection Time: 04/27/25  6:17 PM    Specimen: Urine, Clean Catch   Result Value Ref Range    Fentanyl, Urine Negative Negative   Urinalysis, Microscopic Only - Urine, Clean Catch    Collection Time: 04/27/25  6:17 PM    Specimen: Urine, Clean Catch   Result Value Ref Range    RBC, UA 3-5 (A) None Seen, 0-2 /HPF    WBC, UA 3-5 (A) None Seen, 0-2 /HPF    Bacteria, UA Trace None Seen, Trace /HPF    Squamous Epithelial Cells, UA 3-6 (A) None Seen, 0-2 /HPF    Methodology Manual Light Microscopy    POC Glucose Once    Collection Time: 04/27/25  6:24 PM    Specimen: Blood   Result Value Ref Range    Glucose 234 (H) 70 - 130 mg/dL   STAT Lactic Acid, Reflex    Collection Time: 04/27/25  6:44 PM    Specimen: Blood    Result Value Ref Range    Lactate 2.7 (C) 0.5 - 2.0 mmol/L   POC Glucose Once    Collection Time: 04/27/25  7:26 PM    Specimen: Blood   Result Value Ref Range    Glucose 202 (H) 70 - 130 mg/dL   POC Glucose Once    Collection Time: 04/27/25  8:43 PM    Specimen: Blood   Result Value Ref Range    Glucose 183 (H) 70 - 130 mg/dL   Basic Metabolic Panel    Collection Time: 04/27/25  9:07 PM    Specimen: Blood   Result Value Ref Range    Glucose 178 (H) 65 - 99 mg/dL    BUN 10 6 - 20 mg/dL    Creatinine 1.30 (H) 0.76 - 1.27 mg/dL    Sodium 127 (L) 136 - 145 mmol/L    Potassium 3.9 3.5 - 5.2 mmol/L    Chloride 98 98 - 107 mmol/L    CO2 16.0 (L) 22.0 - 29.0 mmol/L    Calcium 7.7 (L) 8.6 - 10.5 mg/dL    BUN/Creatinine Ratio 7.7 7.0 - 25.0    Anion Gap 13.0 5.0 - 15.0 mmol/L    eGFR 65.3 >60.0 mL/min/1.73   STAT Lactic Acid, Reflex    Collection Time: 04/27/25  9:07 PM    Specimen: Blood   Result Value Ref Range    Lactate 1.6 0.5 - 2.0 mmol/L   POC Glucose Once    Collection Time: 04/27/25 10:04 PM    Specimen: Blood   Result Value Ref Range    Glucose 164 (H) 70 - 130 mg/dL   POC Glucose Once    Collection Time: 04/27/25 11:11 PM    Specimen: Blood   Result Value Ref Range    Glucose 166 (H) 70 - 130 mg/dL   POC Glucose Once    Collection Time: 04/28/25 12:52 AM    Specimen: Blood   Result Value Ref Range    Glucose 92 70 - 130 mg/dL   POC Glucose Once    Collection Time: 04/28/25  1:18 AM    Specimen: Blood   Result Value Ref Range    Glucose 92 70 - 130 mg/dL   Basic Metabolic Panel    Collection Time: 04/28/25  1:22 AM    Specimen: Blood   Result Value Ref Range    Glucose 92 65 - 99 mg/dL    BUN 11 6 - 20 mg/dL    Creatinine 1.50 (H) 0.76 - 1.27 mg/dL    Sodium 129 (L) 136 - 145 mmol/L    Potassium 3.5 3.5 - 5.2 mmol/L    Chloride 101 98 - 107 mmol/L    CO2 15.0 (L) 22.0 - 29.0 mmol/L    Calcium 7.9 (L) 8.6 - 10.5 mg/dL    BUN/Creatinine Ratio 7.3 7.0 - 25.0    Anion Gap 13.0 5.0 - 15.0 mmol/L    eGFR 55.0 (L) >60.0  mL/min/1.73   POC Glucose Once    Collection Time: 04/28/25  2:25 AM    Specimen: Blood   Result Value Ref Range    Glucose 124 70 - 130 mg/dL   POC Glucose Once    Collection Time: 04/28/25  3:58 AM    Specimen: Blood   Result Value Ref Range    Glucose 158 (H) 70 - 130 mg/dL   Basic Metabolic Panel    Collection Time: 04/28/25  4:28 AM    Specimen: Blood   Result Value Ref Range    Glucose 169 (H) 65 - 99 mg/dL    BUN 11 6 - 20 mg/dL    Creatinine 1.43 (H) 0.76 - 1.27 mg/dL    Sodium 125 (L) 136 - 145 mmol/L    Potassium 4.1 3.5 - 5.2 mmol/L    Chloride 101 98 - 107 mmol/L    CO2 11.0 (L) 22.0 - 29.0 mmol/L    Calcium 7.7 (L) 8.6 - 10.5 mg/dL    BUN/Creatinine Ratio 7.7 7.0 - 25.0    Anion Gap 13.0 5.0 - 15.0 mmol/L    eGFR 58.2 (L) >60.0 mL/min/1.73   Magnesium    Collection Time: 04/28/25  4:28 AM    Specimen: Blood   Result Value Ref Range    Magnesium 3.2 (H) 1.6 - 2.6 mg/dL   CBC Auto Differential    Collection Time: 04/28/25  4:28 AM    Specimen: Blood   Result Value Ref Range    WBC 20.20 (H) 3.40 - 10.80 10*3/mm3    RBC 5.51 4.14 - 5.80 10*6/mm3    Hemoglobin 18.0 (H) 13.0 - 17.7 g/dL    Hematocrit 55.0 (H) 37.5 - 51.0 %    MCV 99.8 (H) 79.0 - 97.0 fL    MCH 32.7 26.6 - 33.0 pg    MCHC 32.7 31.5 - 35.7 g/dL    RDW 12.7 12.3 - 15.4 %    RDW-SD 47.1 37.0 - 54.0 fl    MPV 9.9 6.0 - 12.0 fL    Platelets 196 140 - 450 10*3/mm3    Neutrophil % 85.3 (H) 42.7 - 76.0 %    Lymphocyte % 6.7 (L) 19.6 - 45.3 %    Monocyte % 7.0 5.0 - 12.0 %    Eosinophil % 0.0 (L) 0.3 - 6.2 %    Basophil % 0.2 0.0 - 1.5 %    Immature Grans % 0.8 (H) 0.0 - 0.5 %    Neutrophils, Absolute 17.21 (H) 1.70 - 7.00 10*3/mm3    Lymphocytes, Absolute 1.35 0.70 - 3.10 10*3/mm3    Monocytes, Absolute 1.42 (H) 0.10 - 0.90 10*3/mm3    Eosinophils, Absolute 0.01 0.00 - 0.40 10*3/mm3    Basophils, Absolute 0.04 0.00 - 0.20 10*3/mm3    Immature Grans, Absolute 0.17 (H) 0.00 - 0.05 10*3/mm3    nRBC 0.0 0.0 - 0.2 /100 WBC   POC Glucose Once    Collection  Time: 04/28/25  5:08 AM    Specimen: Blood   Result Value Ref Range    Glucose 184 (H) 70 - 130 mg/dL   POC Glucose Once    Collection Time: 04/28/25  6:53 AM    Specimen: Blood   Result Value Ref Range    Glucose 181 (H) 70 - 130 mg/dL   Osmolality, Serum    Collection Time: 04/28/25  6:59 AM    Specimen: Blood   Result Value Ref Range    Osmolality 279 275 - 295 mOsm/kg   Osmolality, Urine - Urine, Clean Catch    Collection Time: 04/28/25  7:02 AM    Specimen: Urine, Clean Catch   Result Value Ref Range    Osmolality, Urine 324 300 - 1,100 mOsm/kg   Sodium, Urine, Random - Urine, Clean Catch    Collection Time: 04/28/25  7:02 AM    Specimen: Urine, Clean Catch   Result Value Ref Range    Sodium, Urine 36 mmol/L   POC Glucose Once    Collection Time: 04/28/25  7:56 AM    Specimen: Blood   Result Value Ref Range    Glucose 179 (H) 70 - 130 mg/dL   Basic Metabolic Panel    Collection Time: 04/28/25  8:50 AM    Specimen: Blood   Result Value Ref Range    Glucose 171 (H) 65 - 99 mg/dL    BUN 11 6 - 20 mg/dL    Creatinine 1.47 (H) 0.76 - 1.27 mg/dL    Sodium 132 (L) 136 - 145 mmol/L    Potassium 4.1 3.5 - 5.2 mmol/L    Chloride 103 98 - 107 mmol/L    CO2 17.0 (L) 22.0 - 29.0 mmol/L    Calcium 8.0 (L) 8.6 - 10.5 mg/dL    BUN/Creatinine Ratio 7.5 7.0 - 25.0    Anion Gap 12.0 5.0 - 15.0 mmol/L    eGFR 56.3 (L) >60.0 mL/min/1.73   POC Glucose Once    Collection Time: 04/28/25  9:09 AM    Specimen: Blood   Result Value Ref Range    Glucose 152 (H) 70 - 130 mg/dL   POC Glucose Once    Collection Time: 04/28/25 10:46 AM    Specimen: Blood   Result Value Ref Range    Glucose 149 (H) 70 - 130 mg/dL   POC Glucose Once    Collection Time: 04/28/25 11:27 AM    Specimen: Blood   Result Value Ref Range    Glucose 128 70 - 130 mg/dL   Potassium    Collection Time: 04/28/25 12:58 PM    Specimen: Blood   Result Value Ref Range    Potassium 4.2 3.5 - 5.2 mmol/L   Uric Acid    Collection Time: 04/28/25 12:58 PM    Specimen: Blood   Result  Value Ref Range    Uric Acid 5.8 3.4 - 7.0 mg/dL   CK    Collection Time: 04/28/25 12:58 PM    Specimen: Blood   Result Value Ref Range    Creatine Kinase 4,801 (H) 20 - 200 U/L   Ferritin    Collection Time: 04/28/25 12:58 PM    Specimen: Blood   Result Value Ref Range    Ferritin 515.30 (H) 30.00 - 400.00 ng/mL   Lactate Dehydrogenase    Collection Time: 04/28/25 12:58 PM    Specimen: Blood   Result Value Ref Range     (H) 135 - 225 U/L   Iron Profile    Collection Time: 04/28/25 12:58 PM    Specimen: Blood   Result Value Ref Range    Iron 46 (L) 59 - 158 mcg/dL    Iron Saturation (TSAT) 15 (L) 20 - 50 %    Transferrin 207 200 - 360 mg/dL    TIBC 308 298 - 536 mcg/dL   Eosinophil Smear - Urine, Urine, Clean Catch    Collection Time: 04/28/25  4:09 PM    Specimen: Urine, Clean Catch   Result Value Ref Range    Eosinophil Smear 0 0 - 0 % EOS/100 Cells   Creatinine Urine Random (kidney function) GFR component - Urine, Clean Catch    Collection Time: 04/28/25  4:09 PM    Specimen: Urine, Clean Catch   Result Value Ref Range    Creatinine, Urine 58.2 mg/dL   Protein, Urine, Random - Urine, Clean Catch    Collection Time: 04/28/25  4:09 PM    Specimen: Urine, Clean Catch   Result Value Ref Range    Total Protein, Urine 12.4 mg/dL   Urea Nitrogen, Urine - Urine, Clean Catch    Collection Time: 04/28/25  4:09 PM    Specimen: Urine, Clean Catch   Result Value Ref Range    Urea Nitrogen, Urine 282 mg/dL   Urinalysis With Culture If Indicated - Urine, Clean Catch    Collection Time: 04/28/25  4:09 PM    Specimen: Urine, Clean Catch   Result Value Ref Range    Color, UA Yellow Yellow, Straw    Appearance, UA Cloudy (A) Clear    pH, UA 6.0 5.0 - 8.0    Specific Gravity, UA 1.010 1.001 - 1.030    Glucose,  mg/dL (1+) (A) Negative    Ketones, UA Negative Negative    Bilirubin, UA Negative Negative    Blood, UA Trace (A) Negative    Protein, UA Negative Negative    Leuk Esterase, UA Negative Negative    Nitrite, UA  Negative Negative    Urobilinogen, UA 0.2 E.U./dL 0.2 - 1.0 E.U./dL   Urinalysis, Microscopic Only - Urine, Clean Catch    Collection Time: 04/28/25  4:09 PM    Specimen: Urine, Clean Catch   Result Value Ref Range    RBC, UA 0-2 None Seen, 0-2 /HPF    WBC, UA 0-2 None Seen, 0-2 /HPF    Bacteria, UA None Seen None Seen, Trace /HPF    Squamous Epithelial Cells, UA 0-2 None Seen, 0-2 /HPF    Hyaline Casts, UA 0-6 0 - 6 /LPF    Methodology Automated Microscopy    POC Glucose Once    Collection Time: 04/28/25  4:34 PM    Specimen: Blood   Result Value Ref Range    Glucose 173 (H) 70 - 130 mg/dL   POC Glucose Once    Collection Time: 04/28/25  8:06 PM    Specimen: Blood   Result Value Ref Range    Glucose 138 (H) 70 - 130 mg/dL   Phosphorus    Collection Time: 04/29/25  4:52 AM    Specimen: Arm, Left; Blood   Result Value Ref Range    Phosphorus 2.1 (L) 2.5 - 4.5 mg/dL   Magnesium    Collection Time: 04/29/25  4:52 AM    Specimen: Arm, Left; Blood   Result Value Ref Range    Magnesium 2.3 1.6 - 2.6 mg/dL   Basic Metabolic Panel    Collection Time: 04/29/25  4:52 AM    Specimen: Arm, Left; Blood   Result Value Ref Range    Glucose 181 (H) 65 - 99 mg/dL    BUN 11 6 - 20 mg/dL    Creatinine 1.19 0.76 - 1.27 mg/dL    Sodium 132 (L) 136 - 145 mmol/L    Potassium 4.6 3.5 - 5.2 mmol/L    Chloride 100 98 - 107 mmol/L    CO2 21.0 (L) 22.0 - 29.0 mmol/L    Calcium 8.3 (L) 8.6 - 10.5 mg/dL    BUN/Creatinine Ratio 9.2 7.0 - 25.0    Anion Gap 11.0 5.0 - 15.0 mmol/L    eGFR 72.6 >60.0 mL/min/1.73   CBC Auto Differential    Collection Time: 04/29/25  4:52 AM    Specimen: Arm, Left; Blood   Result Value Ref Range    WBC 14.56 (H) 3.40 - 10.80 10*3/mm3    RBC 4.77 4.14 - 5.80 10*6/mm3    Hemoglobin 15.6 13.0 - 17.7 g/dL    Hematocrit 44.3 37.5 - 51.0 %    MCV 92.9 79.0 - 97.0 fL    MCH 32.7 26.6 - 33.0 pg    MCHC 35.2 31.5 - 35.7 g/dL    RDW 12.7 12.3 - 15.4 %    RDW-SD 43.7 37.0 - 54.0 fl    MPV 10.1 6.0 - 12.0 fL    Platelets 178 140 -  450 10*3/mm3    Neutrophil % 78.5 (H) 42.7 - 76.0 %    Lymphocyte % 11.1 (L) 19.6 - 45.3 %    Monocyte % 9.3 5.0 - 12.0 %    Eosinophil % 0.5 0.3 - 6.2 %    Basophil % 0.2 0.0 - 1.5 %    Immature Grans % 0.4 0.0 - 0.5 %    Neutrophils, Absolute 11.41 (H) 1.70 - 7.00 10*3/mm3    Lymphocytes, Absolute 1.62 0.70 - 3.10 10*3/mm3    Monocytes, Absolute 1.36 (H) 0.10 - 0.90 10*3/mm3    Eosinophils, Absolute 0.08 0.00 - 0.40 10*3/mm3    Basophils, Absolute 0.03 0.00 - 0.20 10*3/mm3    Immature Grans, Absolute 0.06 (H) 0.00 - 0.05 10*3/mm3    nRBC 0.0 0.0 - 0.2 /100 WBC   POC Glucose Once    Collection Time: 04/29/25  7:25 AM    Specimen: Blood   Result Value Ref Range    Glucose 164 (H) 70 - 130 mg/dL   POC Glucose Once    Collection Time: 04/29/25 11:49 AM    Specimen: Blood   Result Value Ref Range    Glucose 133 (H) 70 - 130 mg/dL   POC Glucose Once    Collection Time: 04/29/25  4:32 PM    Specimen: Blood   Result Value Ref Range    Glucose 122 70 - 130 mg/dL   POC Glucose Once    Collection Time: 04/29/25  9:41 PM    Specimen: Blood   Result Value Ref Range    Glucose 156 (H) 70 - 130 mg/dL   POC Glucose Once    Collection Time: 04/30/25  7:41 AM    Specimen: Blood   Result Value Ref Range    Glucose 123 70 - 130 mg/dL   POC Glucose Once    Collection Time: 04/30/25 11:36 AM    Specimen: Blood   Result Value Ref Range    Glucose 114 70 - 130 mg/dL   POC Glucose Once    Collection Time: 04/30/25  5:23 PM    Specimen: Blood   Result Value Ref Range    Glucose 121 70 - 130 mg/dL   POC Glucose Once    Collection Time: 04/30/25  8:11 PM    Specimen: Blood   Result Value Ref Range    Glucose 129 70 - 130 mg/dL   POC Glucose Once    Collection Time: 05/01/25  7:51 AM    Specimen: Blood   Result Value Ref Range    Glucose 177 (H) 70 - 130 mg/dL   POC Glucose Once    Collection Time: 05/01/25 12:19 PM    Specimen: Blood   Result Value Ref Range    Glucose 117 70 - 130 mg/dL   POC Glucose Once    Collection Time: 05/01/25  5:01  PM    Specimen: Blood   Result Value Ref Range    Glucose 141 (H) 70 - 130 mg/dL   POC Glucose Once    Collection Time: 05/01/25  9:21 PM    Specimen: Blood   Result Value Ref Range    Glucose 206 (H) 70 - 130 mg/dL   Renal Function Panel    Collection Time: 05/02/25  6:34 AM    Specimen: Blood   Result Value Ref Range    Glucose 140 (H) 65 - 99 mg/dL    BUN 15 6 - 20 mg/dL    Creatinine 1.19 0.76 - 1.27 mg/dL    Sodium 134 (L) 136 - 145 mmol/L    Potassium 4.2 3.5 - 5.2 mmol/L    Chloride 97 (L) 98 - 107 mmol/L    CO2 25.0 22.0 - 29.0 mmol/L    Calcium 9.4 8.6 - 10.5 mg/dL    Albumin 3.9 3.5 - 5.2 g/dL    Phosphorus 3.3 2.5 - 4.5 mg/dL    Anion Gap 12.0 5.0 - 15.0 mmol/L    BUN/Creatinine Ratio 12.6 7.0 - 25.0    eGFR 72.6 >60.0 mL/min/1.73   CBC Auto Differential    Collection Time: 05/02/25  6:34 AM    Specimen: Blood   Result Value Ref Range    WBC 9.91 3.40 - 10.80 10*3/mm3    RBC 5.04 4.14 - 5.80 10*6/mm3    Hemoglobin 16.4 13.0 - 17.7 g/dL    Hematocrit 48.0 37.5 - 51.0 %    MCV 95.2 79.0 - 97.0 fL    MCH 32.5 26.6 - 33.0 pg    MCHC 34.2 31.5 - 35.7 g/dL    RDW 12.1 (L) 12.3 - 15.4 %    RDW-SD 42.5 37.0 - 54.0 fl    MPV 10.4 6.0 - 12.0 fL    Platelets 254 140 - 450 10*3/mm3    Neutrophil % 61.1 42.7 - 76.0 %    Lymphocyte % 22.8 19.6 - 45.3 %    Monocyte % 10.9 5.0 - 12.0 %    Eosinophil % 4.3 0.3 - 6.2 %    Basophil % 0.6 0.0 - 1.5 %    Immature Grans % 0.3 0.0 - 0.5 %    Neutrophils, Absolute 6.05 1.70 - 7.00 10*3/mm3    Lymphocytes, Absolute 2.26 0.70 - 3.10 10*3/mm3    Monocytes, Absolute 1.08 (H) 0.10 - 0.90 10*3/mm3    Eosinophils, Absolute 0.43 (H) 0.00 - 0.40 10*3/mm3    Basophils, Absolute 0.06 0.00 - 0.20 10*3/mm3    Immature Grans, Absolute 0.03 0.00 - 0.05 10*3/mm3    nRBC 0.0 0.0 - 0.2 /100 WBC   POC Glucose Once    Collection Time: 05/02/25  7:21 AM    Specimen: Blood   Result Value Ref Range    Glucose 129 70 - 130 mg/dL   POC Glucose Once    Collection Time: 05/02/25 11:14 AM    Specimen:  Blood   Result Value Ref Range    Glucose 157 (H) 70 - 130 mg/dL       If labs were ordered, I independently reviewed the results and considered them in treating the patient.    RADIOLOGY  No orders to display     [] Radiologist's Report Reviewed:  I ordered and independently interpreted the above noted radiographic studies.  See radiologist's dictation for official interpretation.      PROCEDURES    Procedures    No orders to display       MEDICATIONS GIVEN IN ER    Medications   oxyCODONE-acetaminophen (PERCOCET) 5-325 MG per tablet 1 tablet (1 tablet Oral Given 5/5/25 1404)       MEDICAL DECISION MAKING, PROGRESS, and CONSULTS   Medical Decision Making  Juan C Castro is a 55 y.o. male who presents to the ED c/o Rt shoulder pain.  Patient explains that he was in our facility recently.  He had a seizure.  At some point during his stay during seizure activity patient was advised that he fractured his shoulder.  Patient was seen by our orthopedic physicians in-house and it was determined that the patient due to the complexity of the injury needed to follow-up with .  Patient has an appointment scheduled with Moreno Peres.  Patient ran out of his pain meds.  He is here for a refill.      Problems Addressed:  Acute pain of right shoulder: complicated acute illness or injury  Closed fracture of distal end of right humerus, unspecified fracture morphology, initial encounter: complicated acute illness or injury     Details: Patient to continue wearing his sling./Shoulder immobilizer.  Patient to follow-up with Ortho at  as planned.    Risk  Prescription drug management.        Discussion below represents my analysis of pertinent findings related to patient's condition, differential diagnosis, treatment plan and final disposition.    Differential diagnosis: Medication refill, shoulder pain, shoulder fracture.  Additional differential diagnosis include but are not limited to:     Additional  sources  Discussed/ obtained information from independent historians:   [] Spouse  [] Parent  [x] Family member  [] Friend  [] EMS   [] Other:  External (non-ED) record review:   [] Inpatient record:   [] Office record:   [x] Outpatient record:   [x] Prior Outpatient labs:   [x] Prior Outpatient radiology:   [] Primary Care record:   [] Outside ED record:   [] Other:   Patient's care impacted by:   [] Diabetes  [] Hypertension  [] Hyperlipidemia  [] Hypothyroidism   [] Coronary Artery Disease  [] Congestive Heart Failure   [] COPD   [] Cancer   [] Obesity  [] GERD   [] Tobacco Abuse   [] Substance Abuse    [] Anxiety   [] Depression   [x] Other: Seizure  Care significantly affected by Social Determinants of Health (housing and economic circumstances, unemployment)    [] Yes     [x] No   If yes, Patient's care significantly limited by  Social Determinants of Health including:   [] Inadequate housing   [] Low income   [] Alcoholism and drug addiction in family   [] Problems related to primary support group   [] Unemployment   [] Problems related to employment   [] Other Social Determinants of Health:     Orders placed during this visit:  No orders of the defined types were placed in this encounter.      I considered prescription management  with:   [] Pain medication  [] Antiviral  [] Antibiotic   [] Other:   Rationale:  Additional orders considered but not ordered:  The following testing was considered but ultimately not selected after discussion with patient/family:  ED Course:    ED Course as of 05/07/25 1914   Mon May 05, 2025   1350 Shared decision making with Dr. Walker we will authorize 3 days of pain meds.  Patient aware that he will not get any more pain medication from our emergency room related to this particular injury.  Patient aware that he needs to follow-up with primary care for additional refills.  He needs to follow-up with Ortho for additional refills. [KG]      ED Course User Index  [KG] Rj  REINIER Clemens            DIAGNOSIS  Final diagnoses:   Acute pain of right shoulder   Closed fracture of distal end of right humerus, unspecified fracture morphology, initial encounter       DISPOSITION    DISCHARGE    Patient discharged in stable condition.    Reviewed implications of results, diagnosis, meds, responsibility to follow up, warning signs and symptoms of possible worsening, potential complications and reasons to return to ER.    Patient/Family voiced understanding of above instructions.    Discussed plan for discharge, as there is no emergent indication for admission.  Pt/family is agreeable and understands need for follow up and possible repeat testing.  Pt/family is aware that discharge does not mean that nothing is wrong but that it indicates no emergency is currently present that requires admission and they must continue care with follow-up as given below or with a physician of their choice.     FOLLOW-UP  PATIENT CONNECTION - [place] [address]  [phone]        Moreno Peres MD  [address]  [place] [address]  [phone]               Medication List        New Prescriptions      naloxone 4 MG/0.1ML nasal spray  Commonly known as: NARCAN  Call 911. Don't prime. [place] in 1 nostril for overdose. Repeat in 2-3 minutes in other nostril if no or minimal breathing/responsiveness.            Changed      oxyCODONE-acetaminophen 7.5-325 MG per tablet  Commonly known as: PERCOCET  Take 1 tablet by mouth Every 6 (Six) Hours As Needed for Moderate Pain for up to 3 days.  What changed: when to take this               Where to Get Your Medications        These medications were sent to [id number] - [place], [place] - [address] [facility] - [phone] [facility] [phone]   [address] [place], [facility] [address]      Phone: [phone]   naloxone 4 MG/0.1ML nasal  spray  oxyCODONE-acetaminophen 7.5-325 MG per tablet          ED Disposition       ED Disposition   Discharge    Condition   Stable    Comment   --                   Alana De La Rosa, APRN  05/07/25 5808

## 2025-05-06 NOTE — PAYOR COMM NOTE
"  Ref# [id number]   Discharge Summary    KEYSHAWN Coburn, RN  Utilization Review  Phone [phone]  Fax [phone]    [place] [address]       Juan C Castro (55 y.o. Male)       Date of Birth   1970    Social Security Number       Address   [address] [place] [address]    Home Phone   [phone]    MRN   [id number]       [facility]    Marital Status   Single                            Admission Date   2025    Admission Type   Emergency    Admitting Provider   Berkley Valdez DO    Attending Provider       Department, Room/Bed   [facility] 3F, S317/1       Discharge Date   2025    Discharge Disposition   Home or Self Care    Discharge Destination                                 Attending Provider: (none)   Allergies: No Known Allergies    Isolation: None   Infection: None   Code Status: Prior    Ht: 182.9 cm (72\")   Wt: 69.1 kg (152 lb 5.4 oz)    Admission Cmt: None   Principal Problem: Seizure [R56.9]                   Active Insurance as of 2025       Primary Coverage       Payor Plan Insurance Group Employer/Plan Group    [employer]HEM BLUE CROSS ANTHEM BLUE CROSS BLUE SHIELD PPO [id number]       Payor Plan Address Payor Plan Phone Number Payor Plan Fax Number Effective Dates    [address] [phone]  2020 - None Entered    [place] [address]         Subscriber Name Subscriber Birth Date Member ID       JUAN C CASTRO 1970 [id number]                     Emergency Contacts        (Rel.) Home Phone Work Phone Mobile Phone    Jocelyne Castro (Mother) -- -- [phone]    Albert Dennison (Significant Other) -- -- [phone]                 Discharge Summary        Berkley Valdez DO at [facility]              [facility] Medicine Services  DISCHARGE SUMMARY    Patient Name: Juan C Castro  : " 1970  MRN: [id number]    Date of Admission: 4/27/2025 12:04 PM  Date of Discharge: 5/2/2025  Primary Care Physician: Provider, No Known    Consults       Date and Time Order Name Status Description    4/27/2025  7:28 PM Inpatient Orthopedic Surgery Consult      4/27/2025  3:53 PM Inpatient Nephrology Consult Completed     4/27/2025  3:08 PM Inpatient Neurology Consult General Completed             Hospital Course     Presenting Problem: Seizure    Active Hospital Problems    Diagnosis  POA    **Seizure [R56.9]  Yes    Alcohol withdrawal [F10.939]  Yes    Closed fracture of proximal end of right humerus [S42.201A]  Yes    Stage 3a chronic kidney disease [N18.31]  Yes    Type 2 diabetes mellitus [E11.9]  Yes    Mixed hyperlipidemia [E78.2]  Yes    Essential hypertension [I10]  Yes      Resolved Hospital Problems   No resolved problems to display.          Hospital Course:  Juan C Castro is a 55 y.o. male with h/o T2DM, HTN, HL, CKD, etoh use, takes ativan that is not prescribed to him, presented to ER on 4/27/25 for HTN, nausea, chills, and tachypnea.    Had significantly elevated BP with a witnessed seizure in the ER.    He became combative in ER requiring physical restraint and subsequent pain and bruising of RUE.    CT RUE showed comminuted proximal humerus fracture.    Neurology evaluated the patient and treated him with scheduled ativan.    He was also found to be in DKA with only prior history of prediabetes.    Was initially admitted to ICU.  Note that patient apparently lost his job and stopped getting refills on BP meds and other meds.  He was transferred to telemetry for and evaluated by orthopedics.     Seizure  --MRI brain showed no acute abnormality  --seen by neurology who diagnosed as new onset generalized tonic clonic seizure in setting of severe HTN, suspected DKA, sleep deprivation, daily etoh and street ativan.    -- Wean Valium, no prescription given at DC  --NO DRIVING x 90 Days      HTN Urgency, improved  --required cardene gtt now weaned off  --Continue lisinopril and amlodipine     DKA  --resolved  --A1C was 6.60 on 4/27       HL  -- Continue statin     Close Fracture of Proximal Right Humerus  --seen by orthowho recommended shoulder arthroplasty d/t the morphology of the fracture.  He is neurovascularly intact therefore determined not emergent and Dr. Bonner recommends follow-up with  outpatient for surgery.    Ice and sling for comfort for now.  Have sent referral for  Ortho, [facility], clinic is supposed to call patient with appointment  - Percocet ordered for 3 days for pain control     DAKSHA on CKD  --nephro evaluated  --renal u/s normal     Etoh WD  -- Valium weaned needs follow-up with PCP      Discharge Follow Up Recommendations for outpatient labs/diagnostics:  PCP this week    Day of Discharge     HPI:   See progress note on same day    Review of Systems      Vital Signs:   Temp:  [98 °F (36.7 °C)-98.9 °F (37.2 °C)] 98 °F (36.7 °C)  Heart Rate:  [77-84] 83  Resp:  [18] 18  BP: (135-168)/(75-93) 151/90      Physical Exam:  Constitutional: Mildly anxious  HENT: NCAT, mucous membranes moist  Respiratory: Clear to auscultation bilaterally, respiratory effort normal   Cardiovascular: RRR, no murmurs, rubs, or gallops  Gastrointestinal: soft nontender  Musculoskeletal: Right upper extremity swelling  Psychiatric: Appropriate affect, cooperative  Neurologic: Oriented x 3, speech clear  Skin: No rashes      Pertinent  and/or Most Recent Results     LAB RESULTS:      Lab 05/02/25  0634 04/29/25  0452 04/28/25  1258 04/28/25  0428 04/27/25  2107 04/27/25  1844 04/27/25  1633 04/27/25  1549 04/27/25  1443 04/27/25  1230 04/27/25  1229   WBC 9.91 14.56*  --  20.20*  --   --  26.30*  --   --  18.63*  --    HEMOGLOBIN 16.4 15.6  --  18.0*  --   --  18.4*  --   --  20.4*  --    HEMATOCRIT 48.0 44.3  --  55.0*  --   --  51.5*  --   --  56.8*  --    PLATELETS 254 178  --  196  --   --  223   --   --  235  --    NEUTROS ABS 6.05 11.41*  --  17.21*  --   --  23.93*  --   --  16.85*  --    IMMATURE GRANS (ABS) 0.03 0.06*  --  0.17*  --   --   --   --   --  0.09*  --    LYMPHS ABS 2.26 1.62  --  1.35  --   --   --   --   --  0.95  --    MONOS ABS 1.08* 1.36*  --  1.42*  --   --   --   --   --  0.69  --    EOS ABS 0.43* 0.08  --  0.01  --   --  0.00  --   --  0.00  --    MCV 95.2 92.9  --  99.8*  --   --  91.6  --   --  91.0  --    PROCALCITONIN  --   --   --   --   --   --   --   --  0.05  --   --    LACTATE  --   --   --   --  1.6 2.7*  --  10.1*  --   --  3.0*   LDH  --   --  423*  --   --   --   --   --   --   --   --          Lab 05/02/25  0634 04/29/25  0452 04/28/25  1258 04/28/25  0850 04/28/25  0428 04/28/25  0122 04/27/25  1633 04/27/25  1230 04/27/25  1229   SODIUM 134* 132*  --  132* 125* 129*   < >  --  125*   POTASSIUM 4.2 4.6 4.2 4.1 4.1 3.5   < >  --  3.9   CHLORIDE 97* 100  --  103 101 101   < >  --  89*   CO2 25.0 21.0*  --  17.0* 11.0* 15.0*   < >  --  17.0*   ANION GAP 12.0 11.0  --  12.0 13.0 13.0   < >  --  19.0*   BUN 15 11  --  11 11 11   < >  --  9   CREATININE 1.19 1.19  --  1.47* 1.43* 1.50*   < >  --  1.16   EGFR 72.6 72.6  --  56.3* 58.2* 55.0*   < >  --  74.8   GLUCOSE 140* 181*  --  171* 169* 92   < >  --  240*   CALCIUM 9.4 8.3*  --  8.0* 7.7* 7.9*   < >  --  9.1   MAGNESIUM  --  2.3  --   --  3.2*  --   --   --  1.5*   PHOSPHORUS 3.3 2.1*  --   --   --   --   --   --  1.8*   HEMOGLOBIN A1C  --   --   --   --   --   --   --  6.60*  --    TSH  --   --   --   --   --   --   --   --  1.370    < > = values in this interval not displayed.         Lab 05/02/25  0634 04/27/25  1229   TOTAL PROTEIN  --  8.5   ALBUMIN 3.9 4.7   GLOBULIN  --  3.8   ALT (SGPT)  --  34   AST (SGOT)  --  28   BILIRUBIN  --  0.6   ALK PHOS  --  135*   LIPASE  --  25         Lab 04/27/25  1443 04/27/25  1229   HSTROP T 60* 35*             Lab 04/28/25  1258   IRON 46*   IRON SATURATION (TSAT) 15*   TIBC 308    TRANSFERRIN 207   FERRITIN 515.30*         Lab 04/27/25  1442   FIO2 21   CARBOXYHEMOGLOBIN (VENOUS) 1.2     Brief Urine Lab Results  (Last result in the past 365 days)        Color   Clarity   Blood   Leuk Est   Nitrite   Protein   CREAT   Urine HCG        04/28/25 1609 Yellow   Cloudy   Trace   Negative   Negative   Negative           04/28/25 1609             58.2               Microbiology Results (last 10 days)       Procedure Component Value - Date/Time    Eosinophil Smear - Urine, Urine, Clean Catch [[id number]]  (Normal) Collected: 04/28/25 1609    Lab Status: Final result Specimen: Urine, Clean Catch Updated: 04/28/25 1707     Eosinophil Smear 0 % EOS/100 Cells     Narrative:      No eosinophil seen    Blood Culture - Blood, Hand, Right [[id number]]  (Normal) Collected: 04/27/25 1550    Lab Status: Preliminary result Specimen: Blood from Hand, Right Updated: 05/01/25 1615     Blood Culture No growth at 4 days    Blood Culture - Blood, Arm, Left [[id number]]  (Normal) Collected: 04/27/25 1540    Lab Status: Preliminary result Specimen: Blood from Arm, Left Updated: 05/01/25 1615     Blood Culture No growth at 4 days    COVID-19, FLU A/B, RSV PCR 1 HR TAT - Swab, Nasopharynx [[id number]]  (Normal) Collected: 04/27/25 1231    Lab Status: Final result Specimen: Swab from Nasopharynx Updated: 04/27/25 1345     COVID19 Not Detected     Influenza A PCR Not Detected     Influenza B PCR Not Detected     RSV, PCR Not Detected    Narrative:      Fact sheet for providers: https://www.fda.gov/media/864063/download    Fact sheet for patients: https://www.fda.gov/media/225964/download    Test performed by PCR.            US Renal Bilateral  Result Date: 4/29/2025  US RENAL BILATERAL Date of Exam: 4/28/2025 11:24 PM EDT Indication: DAKSHA. Comparison: No comparisons available. Technique: Grayscale and color Doppler ultrasound evaluation of the kidneys and urinary bladder was performed. Findings: The right kidney measures 11.2 x  5.8 x 5.8 cm in length and the left kidney measures 11.9 x 6.3 x 5.2 cm in length Kidneys demonstrate normal cortical echogenicity.  No hydronephrosis or intrarenal stones.  No focal lesions. Bladder is incompletely distended. Minimal debris noted within the bladder.     1. Unremarkable ultrasound of the kidneys. 2. Some minimal debris noted within the bladder. Please correlate with urinalysis. Electronically Signed: Bk Torres MD  4/29/2025 6:08 AM EDT  Workstation ID: [id number]    CT Upper Extremity Right Without Contrast  Result Date: 4/28/2025  CT UPPER EXTREMITY RIGHT WO CONTRAST Date of Exam: 4/28/2025 11:51 AM EDT Indication: right proximal humerus fracture. Comparison: 4/27/2025 Technique: Axial CT images were obtained of the right upper extremity without contrast administration.  Reconstructed coronal and sagittal images were also obtained. Automated exposure control and iterative construction methods were used. Findings: There is a comminuted fracture of the proximal humerus. Fracture appears to involve the surgical and anatomic necks and greater and lesser tuberosities. The primary humeral head component involving the majority of the articular surface is rotated posteriorly relative to the humeral shaft, and dislocated from the glenoid articular surface. The humeral head articular surface projects posteriorly. There is impaction at the surgical neck with proximal migration of the humeral shaft. There is comminution of the greater and lesser tuberosities which appear medially displaced relative to the expected locations. There are multiple small displaced fracture fragments, most notably at the anterior-lateral aspect at the level of the coracoid. Mineralization appears grossly normal. Acromioclavicular alignment appears grossly maintained. There appear to be moderate degenerative changes at the acromioclavicular joint. There is some motion in the distal humerus. No other definite displaced fracture  is identified. Mineralization appears grossly normal. There is suspected edema/hematoma in the surrounding soft tissues at the fracture site. Probable glenohumeral effusion. Rotator cuff tendons are not well visualized or evaluated on this exam. No significant muscle atrophy.     Impression: 1.Comminuted fracture of the proximal humerus. 2.The humeral head articular surface is rotated posteriorly and dislocated from the glenoid articular surface. 3.Impaction at the surgical neck with proximal migration of the humeral shaft. 4.Comminution of the greater and lesser tuberosities which appear medially displaced. Electronically Signed: Horacio Lopez  4/28/2025 3:53 PM EDT  Workstation ID: [id number]    EEG  Result Date: 4/28/2025  Reason for referral: 55 y.o.male with seizure Technical Summary:  A 19 channel digital EEG was performed using the international 10-20 placement system, including eye leads and EKG leads. Duration: 20 minutes Findings: The patient is awake.  A medium amplitude well-regulated 10 Hz posterior rhythm is present symmetrically and somewhat widespread over the posterior leads.  Intermixed theta and alpha activity is seen anteriorly.  Drowsiness and light sleep are seen with mild slowing of the tracing.  Photic stimulation does not change the background.  Hyperventilation is not performed. Video: Available Technical quality: Good Rhythm strip: Regular, 80 bpm SUMMARY: Normal EEG in the awake and lightly asleep states No focal features or epileptiform activity are seen     Normal study This report is transcribed using the Dragon dictation system.  [     MRI Brain With & Without Contrast  Result Date: 4/28/2025  MRI BRAIN W WO CONTRAST Date of Exam: 4/27/2025 11:14 PM EDT Indication: seizure.  Comparison: CT head 4/27/2025. Technique:  Routine multiplanar/multisequence sequence images of the brain were obtained before and after the uneventful administration of 20 mL Multihance. Findings: There is no  diffusion restriction to suggest acute infarct. There is no evidence of acute or chronic intracranial hemorrhage. There is a small focus of encephalomalacia and hemosiderin staining in the inferior left cerebellum, which likely corresponds to  a chronic lacunar infarct. No mass effect or midline shift. No abnormal extra-axial collections.  The major vascular flow voids appear intact. The basal ganglia, brainstem and cerebellum appear within normal limits.  Calvarial and superficial soft tissue signal is within normal limits.  Orbits appear unremarkable.  The paranasal sinuses and the mastoid air cells appear well aerated.  Midline structures are intact. No abnormal enhancement. No evidence of gray matter heterotopia. The hippocampus appears normal bilaterally. No evidence of mesial temporal sclerosis. No obvious cortical dysplasia or migrational abnormality.     Impression: 1.No acute intracranial abnormality. No epileptogenic focus identified. 2.Small chronic lacunar infarct in the inferior left cerebellum. Electronically Signed: Conrad Bruce MD  4/28/2025 1:32 AM EDT  Workstation ID: [id number]    XR Shoulder 2+ View Right  Result Date: 4/27/2025  XR SHOULDER 2+ VW RIGHT Date of Exam: 4/27/2025 6:00 PM EDT Indication: Shoulder pain Comparison: 4/27/2025 Findings: There is an acute comminuted fracture of the proximal humerus. The humeral head appears to be dislocated somewhat posteriorly. AC joint appears within normal limits. Visualized portion of the right ribs are within normal limits     Impression: Acute comminuted displaced fracture of the proximal humerus. Electronically Signed: Tyler Majano MD  4/27/2025 6:44 PM EDT  Workstation ID: [id number]    CT Head Without Contrast  Result Date: 4/27/2025  CT HEAD WO CONTRAST Date of Exam: 4/27/2025 2:17 PM EDT Indication: seizure. Comparison: None available. Technique: Axial CT images were obtained of the head without contrast administration.  Automated exposure  control and iterative construction methods were used. Findings: There is no evidence of acute intracranial hemorrhage, mass, midline shift, loss of gray-white matter differentiation, or extra-axial fluid collection.  There is normal ventricular volume. The basal cisterns are patent. No evidence of fracture.  The paranasal sinuses and mastoid air cells are predominantly well aerated. The orbits and included soft tissues show no acute abnormality.      Impression: No acute intracranial abnormality. Electronically Signed: Conrad Nicole MD  4/27/2025 2:56 PM EDT  Workstation ID: [id number]    XR Chest 1 View  Result Date: 4/27/2025  XR CHEST 1 VW Date of Exam: 4/27/2025 1:15 PM EDT Indication: Tachypnea Comparison: None available. Findings: The cardiomediastinal silhouette is within normal limits. Pulmonary vascularity appears normal. There is no focal airspace consolidation, pleural effusion, or pneumothorax. There are mild degenerative changes of the thoracic spine.     Impression: No acute cardiopulmonary abnormality. Electronically Signed: Sean Graf MD  4/27/2025 1:42 PM EDT  Workstation ID: [id number]                Plan for Follow-up of Pending Labs/Results:   Pending Labs       Order Current Status    Blood Culture - Blood, Arm, Left Preliminary result    Blood Culture - Blood, Hand, Right Preliminary result          Discharge Details        Discharge Medications        New Medications        Instructions Start Date   Accu-Chek Guide Me w/Device kit   Use to test blood glucose levels up to 4 (Four) Times a Day As Needed.      Accu-Chek Guide Test test strip  Generic drug: glucose blood   Use 1 each to test blood glucose levels up to 4 (Four) Times a Day As Needed.      Accu-Chek Softclix Lancets lancets   Use 1 each to test blood glucose levels up to 4 (Four) Times a Day As Needed.      Alcohol Swabs pads   Apply one alcohol swab to injection site of skin immediately prior to insulin injection or sugar  testing.             ASK your doctor about these medications        Instructions Start Date   aspirin 81 MG EC tablet   81 mg, Oral, Daily      atorvastatin 20 MG tablet  Commonly known as: LIPITOR   20 mg, Oral, Daily      lisinopril 30 MG tablet  Commonly known as: PRINIVIL,ZESTRIL   30 mg, Oral, Daily      LORazepam 0.5 MG tablet  Commonly known as: Ativan   0.5 mg, Oral, Every 8 Hours PRN      omeprazole 40 MG capsule  Commonly known as: priLOSEC   40 mg, Oral, Daily      ondansetron 4 MG tablet  Commonly known as: ZOFRAN   4 mg, Oral, Every 8 Hours PRN      sildenafil 25 MG tablet  Commonly known as: Viagra   25 mg, Oral, Daily PRN               No Known Allergies      Discharge Disposition:      Diet:  Hospital:  Diet Order   Procedures    Diet: Cardiac, Diabetic; Healthy Heart (2-3 Na+); Consistent Carbohydrate; Texture: Regular (IDDSI 7); Fluid Consistency: Thin (IDDSI 0)            Activity:      Restrictions or Other Recommendations:         CODE STATUS:    Code Status and Medical Interventions: CPR (Attempt to Resuscitate); Full Support   Ordered at: 04/28/25 1222     Code Status (Patient has no pulse and is not breathing):    CPR (Attempt to Resuscitate)     Medical Interventions (Patient has pulse or is breathing):    Full Support       No future appointments.              Berkley Valdez DO  05/02/25      Time Spent on Discharge:  I spent    minutes on this discharge activity which included: face-to-face encounter with the patient, reviewing the data in the system, coordination of the care with the nursing staff as well as consultants, documentation, and entering orders.            Electronically signed by Berkley Valdez DO at 05/02/25 1443       Discharge Order (From admission, onward)       Start     Ordered    05/02/25 1441  Discharge patient  Once        Expected Discharge Date: 05/02/25   Discharge Disposition: Home or Self Care   Physician of Record for Attribution - Please  select from Treatment Team: CICI COUGHLIN [[id number]]   Review needed by CMO to determine Physician of Record: No      Question Answer Comment   Physician of Record for Attribution - Please select from Treatment Team CICI COUGHLIN    Review needed by CMO to determine Physician of Record No        05/02/25 1444